# Patient Record
Sex: FEMALE | Race: WHITE | Employment: OTHER | URBAN - METROPOLITAN AREA
[De-identification: names, ages, dates, MRNs, and addresses within clinical notes are randomized per-mention and may not be internally consistent; named-entity substitution may affect disease eponyms.]

---

## 2017-01-12 ENCOUNTER — OFFICE VISIT (OUTPATIENT)
Dept: OBGYN | Facility: CLINIC | Age: 77
End: 2017-01-12
Payer: MEDICARE

## 2017-01-12 VITALS
BODY MASS INDEX: 29.37 KG/M2 | HEIGHT: 64 IN | WEIGHT: 172 LBS | DIASTOLIC BLOOD PRESSURE: 60 MMHG | SYSTOLIC BLOOD PRESSURE: 142 MMHG | HEART RATE: 60 BPM

## 2017-01-12 DIAGNOSIS — R22.32 MASS OF LEFT AXILLA: Primary | ICD-10-CM

## 2017-01-12 PROCEDURE — 99202 OFFICE O/P NEW SF 15 MIN: CPT | Performed by: OBSTETRICS & GYNECOLOGY

## 2017-01-12 NOTE — PROGRESS NOTES
SUBJECTIVE:                                                   Aditi Palacios is a 76 year old female who presents to clinic today for the following health issue(s):  Patient presents with:  Breast Mass: lump under left axillary noticed yesterday, painful to touch    Additional Information: patients last mammogram was as [facility] 2009    HPI:The patient is seen at this time for a new left axillary mass. She has a history of lung cancer with a left lobectomy 11 years ago. She had no follow-up after that.  Her last mammogram was in 2009. She denies any nipple discharge at all. She states that the mass is not in the breast but actually in the axilla.      No LMP recorded. Patient has had a hysterectomy..   Patient is not sexually active, post menopausa  STD testing offered?  Declined - not sexually active  Health maintenance updated:  Maintained with primary care provider      Problem list and histories reviewed & adjusted, as indicated.  Additional history: as documented.    Patient Active Problem List   Diagnosis     Myalgia     Acute kidney injury (H)     GI bleed     Past Surgical History   Procedure Laterality Date     Billateral common illiac artery stent       Implant pacemaker       medtronic     Back surgery       c2-5 and 5-6 fusion     Extracapsular cataract extration with intraocular lens implant       Thoracotomy  12/18/01     L upper lobe     Orthopedic surgery       L ankle     Gyn surgery       total hysterectomy     Breast surgery        Social History   Substance Use Topics     Smoking status: Not on file     Smokeless tobacco: Not on file     Alcohol Use: Not on file           Current Outpatient Prescriptions   Medication Sig     LISINOPRIL PO Take 5 mg in am and take 20 mg at bedtime     MELATONIN PO Take 5 mg by mouth At Bedtime     HYDROcodone-acetaminophen (NORCO) 5-325 MG per tablet Take 1 tablet by mouth Take 1 to 2 tablets every 6 hours as needed     ALPRAZolam (XANAX) 0.5 MG tablet Take  0.5 mg by mouth 3 times daily      omeprazole 20 MG tablet Take 1 tablet (20 mg) by mouth daily Take 30-60 minutes before a meal.     aspirin 81 MG chewable tablet Take 81 mg by mouth every evening     FUROSEMIDE PO Take 20 mg by mouth daily Am and afternoon     hypromellose (GENTEAL) ophthalmic gel 0.3% Place 1-2 drops into both eyes At Bedtime     polyethylene glycol 0.4%- propylene glycol 0.3% (SYSTANE ULTRA) 0.4-0.3 % SOLN ophthalmic solution Place 1 drop into both eyes every hour as needed for dry eyes     polyethylene glycol (MIRALAX/GLYCOLAX) powder Take 1 capful by mouth every evening as needed for constipation     psyllium (METAMUCIL) 58.6 % POWD Take 3 teaspoonful by mouth every evening as needed for constipation = 1 Tablespoons     Cholecalciferol (VITAMIN D3 PO) Take 1,000 Units by mouth daily     brinzolamide-brimonidine (SIMBRINZA) 1-0.2 % ophthalmic suspension Place 1 drop into both eyes 2 times daily     diltiazem (TIAZAC) 240 MG CP24 Take 240 mg by mouth every evening      Flecainide Acetate (TAMBOCOR PO) Take 50 mg by mouth 2 times daily     LEVOTHYROXINE SODIUM PO Take 88 mcg by mouth daily      lidocaine (LIDODERM) 5 % patch Place 1-3 patches onto the skin every 24 hours 12 hours on, 12 hours off     PRAVASTATIN SODIUM PO Take 10 mg by mouth every evening      SPIRONOLACTONE PO Take 12.5 mg by mouth daily      WARFARIN SODIUM PO Take 5 mg by mouth every evening      CARVEDILOL 12.5 MG OR TABS take 1 tablet in am, 2 tablets in pm and 1 tablet at night     XALATAN 0.005 % OP SOLN 1 drop in both eyes daily at bedtime     No current facility-administered medications for this visit.     Allergies   Allergen Reactions     Amitriptyline      Atenolol      Codeine      Coreg [Carvedilol]      CR only     Crestor [Rosuvastatin]      Doxycycline      Fentanyl      Lotrel      Niaspan [Niacin]      No Clinical Screening - See Comments      Fentanyl/Versed combination     Other [Seasonal Allergies]      Beta  "blockers except for carvedilol     Percocet [Oxycodone-Acetaminophen]      Percodan [Oxycodone-Aspirin]      Premarin      Questran [Cholestyramine]      Tape [Adhesive Tape]      Tramadol      Vioxx      Zocor [Simvastatin]        ROS:  12 point review of systems negative other than symptoms noted below.  Breast: Lumps    OBJECTIVE:     /60 mmHg  Pulse 60  Ht 1.626 m (5' 4\")  Wt 78.019 kg (172 lb)  BMI 29.51 kg/m2  Body mass index is 29.51 kg/(m^2).    Exam:  Constitutional:  Appearance: Well nourished, well developed alert, in no acute distress  Neck:  Lymph Nodes:  No lymphadenopathy present; Thyroid:  Gland size normal, nontender, no nodules or masses present on palpation  Breasts:  Inspection of Breasts:  No lymphadenopathy present; Palpation of Breasts and Axillae:  5 mm masspresent on palpation in the left axillae, bilat breast tenderness     In-Clinic Test Results:      ASSESSMENT/PLAN:                                                        ICD-10-CM    1. Mass of left axilla R22.32 MA Diagnostic Digital Bilateral       76-year-old patient with past history of lung cancer and distant mammogram who is seen for a 5 mm mobile firmleft axillary subcutaneous nodule. It is indeterminate whether or not this is a lymph node. I do not believe that it is breast tissue. We will expedite getting her to the [facility] for evaluation yet today.        Aaron Wilks MD  [facility]    "

## 2017-01-12 NOTE — MR AVS SNAPSHOT
After Visit Summary   1/12/2017    Aditi Palacios    MRN: [id number]           Patient Information     Date Of Birth          1940        Visit Information        Provider Department      1/12/2017 1:45 PM Aaron Wilks MD [facility]        Today's Diagnoses     Mass of left axilla    -  1        Follow-ups after your visit        Your next 10 appointments already scheduled     Jan 13, 2017  9:30 AM   MA DIAGNOSTIC DIGITAL BILATERAL with SHBCMA3   [facility] ([facility])    [address] [place] [address], [address]  [facility] [address]   [phone]           Do not use any powder, lotion or deodorant under your arms or on your breast. If you do, we will ask you to remove it before your exam.  Wear comfortable, two-piece clothing.  If you have any allergies, tell your care team.  Bring any previous mammograms from other facilities or have them mailed to the breast center.            Jan 13, 2017 10:00 AM   US AXILLARY LEFT with SHBCUS2   [facility] ([facility])    [address], [address]  [facility] [address]   [phone]           Please bring a list of your medicines (including vitamins, minerals and over-the-counter drugs). Also, tell your doctor about any allergies you may have. Wear comfortable clothes and leave your valuables at home.  You do not need to do anything special to prepare for your exam.  Please call the Imaging Department at your exam site with any questions.              Future tests that were ordered for you today     Open Future Orders        Priority Expected Expires Ordered    US Axillary Left Routine  1/12/2018 1/12/2017    MA Diagnostic Digital Bilateral Routine  1/12/2018 1/12/2017            Who to contact     If you have questions or need follow up information about today's clinic visit or your schedule please contact [facility] directly at  "[phone].  Normal or non-critical lab and imaging results will be communicated to you by MyChart, letter or phone within 4 business days after the clinic has received the results. If you do not hear from us within 7 days, please contact the clinic through Alertshart or phone. If you have a critical or abnormal lab result, we will notify you by phone as soon as possible.  Submit refill requests through Newdea or call your pharmacy and they will forward the refill request to us. Please allow 3 business days for your refill to be completed.          Additional Information About Your Visit        Alertshart Information     Newdea lets you send messages to your doctor, view your test results, renew your prescriptions, schedule appointments and more. To sign up, go to [email or web address] . Click on \"Log in\" on the left side of the screen, which will take you to the Welcome page. Then click on \"Sign up Now\" on the right side of the page.     You will be asked to enter the access code listed below, as well as some personal information. Please follow the directions to create your username and password.     Your access code is: [email or web address]  Expires: 2017  2:37 PM     Your access code will  in 90 days. If you need help or a new code, please call your [facility] clinic or [phone].        Care EveryWhere ID     This is your Care EveryWhere ID. This could be used by other organizations to access your [facility] medical records  [id number]        Your Vitals Were     Pulse Height BMI (Body Mass Index)             60 5' 4\" (1.626 m) 29.51 kg/m2          Blood Pressure from Last 3 Encounters:   17 142/60   16 165/70   10/02/16 156/50    Weight from Last 3 Encounters:   17 172 lb (78.019 kg)   16 170 lb (77.111 kg)   01/03/15 169 lb (76.658 kg)              Today, you had the following     No orders found for display       Primary Care Provider Office Phone # Fax #    Parveen Short MD " [phone]       [facility] [address]  [facility] [address]        Thank you!     Thank you for choosing [facility]  for your care. Our goal is always to provide you with excellent care. Hearing back from our patients is one way we can continue to improve our services. Please take a few minutes to complete the written survey that you may receive in the mail after your visit with us. Thank you!             Your Updated Medication List - Protect others around you: Learn how to safely use, store and throw away your medicines at www.disposemymeds.org.          This list is accurate as of: 1/12/17  2:38 PM.  Always use your most recent med list.                   Brand Name Dispense Instructions for use    ALPRAZolam 0.5 MG tablet    XANAX    45 tablet    Take 0.5 mg by mouth 3 times daily       aspirin 81 MG chewable tablet      Take 81 mg by mouth every evening       brinzolamide-brimonidine 1-0.2 % ophthalmic suspension    SIMBRINZA     Place 1 drop into both eyes 2 times daily       carvedilol 12.5 MG tablet    COREG     take 1 tablet in am, 2 tablets in pm and 1 tablet at night       diltiazem 240 MG 24 hr ER beaded capsule    TIAZAC     Take 240 mg by mouth every evening       FUROSEMIDE PO      Take 20 mg by mouth daily Am and afternoon       HYDROcodone-acetaminophen 5-325 MG per tablet    NORCO    1 tablet    Take 1 tablet by mouth Take 1 to 2 tablets every 6 hours as needed       hypromellose 0.3 % Gel ophthalmic gel    GENTEAL     Place 1-2 drops into both eyes At Bedtime       LEVOTHYROXINE SODIUM PO      Take 88 mcg by mouth daily       lidocaine 5 % Patch    LIDODERM     Place 1-3 patches onto the skin every 24 hours 12 hours on, 12 hours off       LISINOPRIL PO      Take 5 mg in am and take 20 mg at bedtime       MELATONIN PO      Take 5 mg by mouth At Bedtime       omeprazole 20 MG tablet     30 tablet    Take 1 tablet (20 mg) by mouth daily  Take 30-60 minutes before a meal.       polyethylene glycol powder    MIRALAX/GLYCOLAX     Take 1 capful by mouth every evening as needed for constipation       PRAVASTATIN SODIUM PO      Take 10 mg by mouth every evening       psyllium 58.6 % Powd    METAMUCIL     Take 3 teaspoonful by mouth every evening as needed for constipation = 1 Tablespoons       SPIRONOLACTONE PO      Take 12.5 mg by mouth daily       SYSTANE ULTRA 0.4-0.3 % Soln ophthalmic solution   Generic drug:  polyethylene glycol 0.4%- propylene glycol 0.3%      Place 1 drop into both eyes every hour as needed for dry eyes       TAMBOCOR PO      Take 50 mg by mouth 2 times daily       VITAMIN D3 PO      Take 1,000 Units by mouth daily       WARFARIN SODIUM PO      Take 5 mg by mouth every evening       XALATAN 0.005 % ophthalmic solution   Generic drug:  latanoprost      1 drop in both eyes daily at bedtime

## 2017-01-13 ENCOUNTER — HOSPITAL ENCOUNTER (OUTPATIENT)
Dept: MAMMOGRAPHY | Facility: CLINIC | Age: 77
End: 2017-01-13
Attending: OBSTETRICS & GYNECOLOGY
Payer: MEDICARE

## 2017-01-13 ENCOUNTER — HOSPITAL ENCOUNTER (OUTPATIENT)
Dept: MAMMOGRAPHY | Facility: CLINIC | Age: 77
Discharge: HOME | End: 2017-01-13
Attending: OBSTETRICS & GYNECOLOGY | Admitting: OBSTETRICS & GYNECOLOGY
Payer: MEDICARE

## 2017-01-13 DIAGNOSIS — R22.32 MASS OF LEFT AXILLA: ICD-10-CM

## 2017-01-13 DIAGNOSIS — N64.89 OTHER SPECIFIED DISORDERS OF BREAST: ICD-10-CM

## 2017-01-13 DIAGNOSIS — N63.0 LUMP IN FEMALE BREAST: ICD-10-CM

## 2017-01-13 PROCEDURE — 76642 ULTRASOUND BREAST LIMITED: CPT | Mod: 50

## 2017-01-13 PROCEDURE — G0204 DX MAMMO INCL CAD BI: HCPCS

## 2017-01-16 ENCOUNTER — TELEPHONE (OUTPATIENT)
Dept: NURSING | Facility: CLINIC | Age: 77
End: 2017-01-16

## 2017-01-16 NOTE — TELEPHONE ENCOUNTER
ppatient informed to see Dr. Valerio Woodson about having the nodule removed. She accepts this recommendation.

## 2017-01-16 NOTE — TELEPHONE ENCOUNTER
1/12/17 Office visit.  [facility] leaving left axilla lump and just watching. Pt is not happy with this recommendation. Dr. Mcconnell gave pt a letter and wrote on side of letter that pt needs to check in on Wednesday 1/18/17. Pt does not know what this appointment is for. Pt wants Dr. Wilks's recommendation and pt wants something done with mass on the right and remove mass on the left. Routing to Dr. Wilks. Please advise. Pt can be reached at [phone].       [facility]: BI-RADS CATEGORY: 4 - Suspicious.     1. Indeterminate hypoechoic nodule adjacent to the right nipple, 12:00  position. Ultrasound-guided core biopsy is recommended.     2. No evidence of malignancy in the left axilla.     3. Results and recommendation for right breast biopsy were discussed  with the patient during her appointment.     RECOMMENDED FOLLOW-UP: Biopsy.IMPRESSION: BI-RADS CATEGORY: 4 - Suspicious.     1. Indeterminate hypoechoic nodule adjacent to the right nipple, 12:00  position. Ultrasound-guided core biopsy is recommended.     2. No evidence of malignancy in the left axilla.     3. Results and recommendation for right breast biopsy were discussed  with the patient during her appointment.     RECOMMENDED FOLLOW-UP: Biopsy.

## 2017-01-16 NOTE — TELEPHONE ENCOUNTER
Saw Dr Wilks about lump.  Saw Radiologty and they handed her a letter and it's not clear as to what she's to do regarding lumps found in breast. Please call.  Pebbles Thompson RN-[facility]

## 2017-01-27 ENCOUNTER — RECORDS - HEALTHEAST (OUTPATIENT)
Dept: ADMINISTRATIVE | Facility: OTHER | Age: 77
End: 2017-01-27

## 2017-01-31 ENCOUNTER — OFFICE VISIT (OUTPATIENT)
Dept: SURGERY | Facility: CLINIC | Age: 77
End: 2017-01-31
Payer: MEDICARE

## 2017-01-31 VITALS — WEIGHT: 172 LBS | OXYGEN SATURATION: 98 % | HEART RATE: 60 BPM | BODY MASS INDEX: 29.37 KG/M2 | HEIGHT: 64 IN

## 2017-01-31 DIAGNOSIS — N63.10 BREAST MASS, RIGHT: Primary | ICD-10-CM

## 2017-01-31 PROCEDURE — 99203 OFFICE O/P NEW LOW 30 MIN: CPT | Performed by: SURGERY

## 2017-01-31 NOTE — MR AVS SNAPSHOT
After Visit Summary   1/31/2017    Aditi Palacios    MRN: [id number]           Patient Information     Date Of Birth          1940        Visit Information        Provider Department      1/31/2017 1:00 PM Valerio Woodson MD [facility]      Today's Diagnoses     Breast mass, right    -  1        Follow-ups after your visit        Your next 10 appointments already scheduled     Feb 08, 2017 11:00 AM   US BREAST BIOPSY CORE NEEDLE RIGHT with SHBCUS1,  BREAST NURSE, SH BREAST RAD   [facility] ([facility])    [address] [place] [address], [address]  [facility] [id number] [phone]           Tell us in advance if there s any chance you may be pregnant.  Bring a list of your medicines to the exam. Include vitamins, minerals and over-the-counter drugs.  If you take blood thinners, you may need to stop taking them a few days before treatment. Talk to your doctor before stopping these medicines. You will need a blood test the morning of your exam.   Stop taking Coumadin (warfarin) 3 days before your exam. Restart the day after your exam.   If you take aspirin, you may need to stop taking it 3 days before your scan.   If you take Plavix, Ticlid, Pletal or Persantine, you may need to stop taking them 5 days before your scan. Please talk to your doctor before stopping these medicines.  If you will receive sedation for this test (medicine to help you relax):   See your family doctor for an exam within 30 days of treatment.   Plan for an adult to drive you home and stay with you for at least 6 hours.   Follow the eating and drinking guidelines checked below:   No eating or drinking for 4 hours before your test. You may take medicine with small sips of water.   If you have diabetes:If you take insulin, call your diabetes care team. Do not take diabetes pills on the morning of your test. If you take metformin  (Avandamet, Glucophage, Glucovance, Metaglip) and received contrast, wait 48 hours before re-starting this medicine.  Please call the Imaging Department at your exam site with any questions.            Feb 08, 2017 12:00 PM   MA POST PROCEDURE RIGHT with SHBCMA1   [facility] ([facility])    [address], [address]  [place] [id number] [phone]           Do not use any powder, lotion or deodorant under your arms or on your breast. If you do, we will ask you to remove it before your exam.  Wear comfortable, two-piece clothing.  If you have any allergies, tell your care team.  Bring any previous mammograms from other facilities or have them mailed to the breast center.              Future tests that were ordered for you today     Open Future Orders        Priority Expected Expires Ordered    US Breast Radioactive Seed Placement, 1St Lesion Right Routine 1/31/2017 1/31/2018 1/31/2017    MA Breast Specimen Right OR Routine 1/31/2017 1/31/2018 1/31/2017    MA Post Procedure Right Routine 1/31/2017 1/31/2018 1/31/2017            Who to contact     If you have questions or need follow up information about today's clinic visit or your schedule please contact [facility] directly at [phone].  Normal or non-critical lab and imaging results will be communicated to you by Authyhart, letter or phone within 4 business days after the clinic has received the results. If you do not hear from us within 7 days, please contact the clinic through MyChart or phone. If you have a critical or abnormal lab result, we will notify you by phone as soon as possible.  Submit refill requests through Wikidata or call your pharmacy and they will forward the refill request to us. Please allow 3 business days for your refill to be completed.          Additional Information About Your Visit        Wikidata Information     Wikidata lets you send messages to your doctor, view your test  "results, renew your prescriptions, schedule appointments and more. To sign up, go to [email or web address] . Click on \"Log in\" on the left side of the screen, which will take you to the Welcome page. Then click on \"Sign up Now\" on the right side of the page.     You will be asked to enter the access code listed below, as well as some personal information. Please follow the directions to create your username and password.     Your access code is: [email or web address]  Expires: 2017  2:37 PM     Your access code will  in 90 days. If you need help or a new code, please call your [facility] clinic or [phone].        Care EveryWhere ID     This is your Care EveryWhere ID. This could be used by other organizations to access your [facility] medical records  [id number]        Your Vitals Were     Pulse Height BMI (Body Mass Index) Pulse Oximetry          60 5' 4\" (1.626 m) 29.51 kg/m2 98%         Blood Pressure from Last 3 Encounters:   17 142/60   16 165/70   10/02/16 156/50    Weight from Last 3 Encounters:   17 172 lb (78.019 kg)   17 172 lb (78.019 kg)   16 170 lb (77.111 kg)               Primary Care Provider Office Phone # Fax #    Parveen Short [phone]       [facility] [address]  [facility] [address]        Thank you!     Thank you for choosing [facility]  for your care. Our goal is always to provide you with excellent care. Hearing back from our patients is one way we can continue to improve our services. Please take a few minutes to complete the written survey that you may receive in the mail after your visit with us. Thank you!             Your Updated Medication List - Protect others around you: Learn how to safely use, store and throw away your medicines at www.disposemymeds.org.          This list is accurate as of: 17  2:02 PM.  Always use your most recent med list.                   Brand Name " Dispense Instructions for use    ALPRAZolam 0.5 MG tablet    XANAX    45 tablet    Take 0.5 mg by mouth 3 times daily       aspirin 81 MG chewable tablet      Take 81 mg by mouth every evening       brinzolamide-brimonidine 1-0.2 % ophthalmic suspension    SIMBRINZA     Place 1 drop into both eyes 2 times daily       carvedilol 12.5 MG tablet    COREG     take 1 tablet in am, 2 tablets in pm and 1 tablet at night       diltiazem 240 MG 24 hr ER beaded capsule    TIAZAC     Take 240 mg by mouth every evening       FUROSEMIDE PO      Take 20 mg by mouth daily Am and afternoon       HYDROcodone-acetaminophen 5-325 MG per tablet    NORCO    1 tablet    Take 1 tablet by mouth Take 1 to 2 tablets every 6 hours as needed       hypromellose 0.3 % Gel ophthalmic gel    GENTEAL     Place 1-2 drops into both eyes At Bedtime       LEVOTHYROXINE SODIUM PO      Take 88 mcg by mouth daily       lidocaine 5 % Patch    LIDODERM     Place 1-3 patches onto the skin every 24 hours 12 hours on, 12 hours off       LISINOPRIL PO      Take 5 mg in am and take 20 mg at bedtime       MELATONIN PO      Take 5 mg by mouth At Bedtime       omeprazole 20 MG tablet     30 tablet    Take 1 tablet (20 mg) by mouth daily Take 30-60 minutes before a meal.       polyethylene glycol powder    MIRALAX/GLYCOLAX     Take 1 capful by mouth every evening as needed for constipation       PRAVASTATIN SODIUM PO      Take 10 mg by mouth every evening       psyllium 58.6 % Powd    METAMUCIL     Take 3 teaspoonful by mouth every evening as needed for constipation = 1 Tablespoons       SPIRONOLACTONE PO      Take 12.5 mg by mouth daily       SYSTANE ULTRA 0.4-0.3 % Soln ophthalmic solution   Generic drug:  polyethylene glycol 0.4%- propylene glycol 0.3%      Place 1 drop into both eyes every hour as needed for dry eyes       TAMBOCOR PO      Take 50 mg by mouth 2 times daily       VITAMIN D3 PO      Take 1,000 Units by mouth daily       WARFARIN SODIUM PO       Take 5 mg by mouth every evening       XALATAN 0.005 % ophthalmic solution   Generic drug:  latanoprost      1 drop in both eyes daily at bedtime

## 2017-01-31 NOTE — Clinical Note
"Surgery Consultation, [facility]    January 31, 2017         Valerio Woodson MD    Aditi Palacios MRN# [id number]   YOB: 1940 Age: 76 year old     PCP:  Parveen Short [phone]    Chief Complaint:  Right breast mass, left axillary cyst    Pt was seen in consultation from Parveen Short.    History of Present Illness:  Aditi Palacios is a 76 year old female who presented with fullness and irritation in the left axilla. Patient noticed swelling and fullness in the left and eventually had some purulent  Imaging was done of both breasts.The left axillary area was thought to represent a sebaceous cyst but small nodule was also noted in the retro-areolar right breast.  Patient does not have a history of breast cancer but she has had benign biopsies.  She is postmenopausal, takes no hormone replacement.  She is on Coumadin for a history of vascular disease.    PMH:  Aditi Palacios  has a past medical history of Anxiety; Vascular complications of mesenteric artery; Atrial fibrillation (H); Fibromyalgia; Gastro-oesophageal reflux disease; Glaucoma; Hypothyroidism; Hypertension; Cancer (H); and Scoliosis.  PSH:  Aditi Palacios  has past surgical history that includes Billateral common illiac artery stent; Implant pacemaker; back surgery; Extracapsular cataract extration with intraocular lens implant; Thoracotomy (12/18/01); orthopedic surgery; GYN surgery; and Breast surgery.    Home medications and allergies reviewed.    Social History:  Aditi Palacios  reports that she has never smoked. She does not have any smokeless tobacco history on file.  Family History:  Aditi Palacios family history includes Unknown/Adopted in her father and mother.    ROS:  The 10 point Review of Systems is negative other than noted in the HPI.  No weight loss or fevers    Physical Exam:  Pulse 60, height 5' 4\" (1.626 m), weight 172 lb (78.019 kg), SpO2 98 %.  172 lbs 0 oz  Pleasant, poorly mobile " female in no distress.  Patient has a pleasant affect and communicates well.   Pupils equal round and reactive to light.   No cervical lymphadenopathy or thyromegaly.   Lung fields clear, breathing comfortably.   Heart normal sinus rhythm.  No murmurs rubs or gallops.  Bilateral breast exam performed. Moderately sized, ptotic breasts .  No axillary lymphadenopathy. Small sebaceous cysts in right axilla.  Skin warm, dry.  No obvious rashes or lesions.    All new lab and imaging data was reviewed, including imaging studies.     Assessment/plan:  Pleasant 76-year-old female with right breast lesion and left axillary cysts. I recommended a biopsy of the right breast lesion under ultrasound guidance.  I would then recommend excision of  left axillary cysts. If the lesion on the right is cancer I would advocate seed localization with wide local excision and sentinel lymph node biopsy followed up with radiation therapy We will get the biopsy ordered.      Herminio Woodson M.D.  [facility]  [phone]

## 2017-01-31 NOTE — NURSING NOTE
Breast Patients    BREAST PATIENTS (ALL)    1-Do you have any of the following symptoms? Lump(s) or Mass(es)  2-In which breast are you having the symptoms? R  3-Do you use hormones?  No  4-Have you had a Mammogram? Yes  Where:  breast center  Date: 1/13/17  5-Have you ever had a breast cyst drained? No  6-Have you ever had a breast biopsy? Yes, years ago  7-Have you ever had a Breast Cancer? No   8-Is there a history of Breast Cancer in your family? Yes   Relationship to you:    Grandmother  9-Have you ever had Ovarian Cancer? No  10-Is there a history of Ovarian Cancer in your family? No  11-Summarize your caffeine intake (i.e. coffee, tea, chocolate, soda etc.): occasional    BREAST PATIENTS (FEMALE)    12-What age did your periods begin? 12  13-Date your last menstrual period began? 8/13/81 hysterectomy  14-Number of full-term pregnancies: 6  15-Your age when your first child was born? 18  16-Did you nurse your children? No  17-Are you pregnant now? No  18-Have you begun menopause? Yes  Age Menopause began:  1981  19-Have you had either ovary removed?Yes  Date of Surgery:  1981  20-Do you have breast implants? No

## 2017-02-01 ENCOUNTER — HOSPITAL ENCOUNTER (OUTPATIENT)
Facility: CLINIC | Age: 77
End: 2017-02-01
Attending: SURGERY | Admitting: SURGERY
Payer: MEDICARE

## 2017-02-01 PROBLEM — N63.10 BREAST MASS, RIGHT: Status: ACTIVE | Noted: 2017-02-01

## 2017-02-01 NOTE — PROGRESS NOTES
"Surgery Consultation, [facility]         Valerio Woodson MD    Aditi Palacios MRN# [id number]   YOB: 1940 Age: 76 year old     PCP:  Parveen Short [phone]    Chief Complaint:  Right breast mass, left axillary cyst    Pt was seen in consultation from Parveen Short.    History of Present Illness:  Aditi Palacios is a 76 year old female who presented with fullness and irritation in the left axilla. Patient noticed swelling and fullness in the left and eventually had some purulent  Imaging was done of both breasts.The left axillary area was thought to represent a sebaceous cyst but small nodule was also noted in the retro-areolar right breast.  Patient does not have a history of breast cancer but she has had benign biopsies.  She is postmenopausal, takes no hormone replacement.  She is on Coumadin for a history of vascular disease.    PMH:  Aditi Palacios  has a past medical history of Anxiety; Vascular complications of mesenteric artery; Atrial fibrillation (H); Fibromyalgia; Gastro-oesophageal reflux disease; Glaucoma; Hypothyroidism; Hypertension; Cancer (H); and Scoliosis.  PSH:  Aditi Palacios  has past surgical history that includes Billateral common illiac artery stent; Implant pacemaker; back surgery; Extracapsular cataract extration with intraocular lens implant; Thoracotomy (12/18/01); orthopedic surgery; GYN surgery; and Breast surgery.    Home medications and allergies reviewed.    Social History:  Aditi Palacios  reports that she has never smoked. She does not have any smokeless tobacco history on file.  Family History:  Aditi Palacios family history includes Unknown/Adopted in her father and mother.    ROS:  The 10 point Review of Systems is negative other than noted in the HPI.  No weight loss or fevers    Physical Exam:  Pulse 60, height 5' 4\" (1.626 m), weight 172 lb (78.019 kg), SpO2 98 %.  172 lbs 0 oz  Pleasant, poorly mobile female in no " distress.  Patient has a pleasant affect and communicates well.   Pupils equal round and reactive to light.   No cervical lymphadenopathy or thyromegaly.   Lung fields clear, breathing comfortably.   Heart normal sinus rhythm.  No murmurs rubs or gallops.  Bilateral breast exam performed. Moderately sized, ptotic breasts .  No axillary lymphadenopathy. Small sebaceous cysts in right axilla.  Skin warm, dry.  No obvious rashes or lesions.    All new lab and imaging data was reviewed, including imaging studies.     Assessment/plan:  Pleasant 76-year-old female with right breast lesion and left axillary cysts. I recommended a biopsy of the right breast lesion under ultrasound guidance.  I would then recommend excision of  left axillary cysts. If the lesion on the right is cancer I would advocate seed localization with wide local excision and sentinel lymph node biopsy followed up with radiation therapy We will get the biopsy ordered.      Herminio Woodson M.D.  [facility]  [phone]    Please route or send letter to:  Primary Care Provider (PCP) and Referring Provider    HPI      ROS      Physical Exam

## 2017-02-02 ENCOUNTER — TELEPHONE (OUTPATIENT)
Dept: MAMMOGRAPHY | Facility: CLINIC | Age: 77
End: 2017-02-02

## 2017-02-02 RX ORDER — CEFAZOLIN SODIUM 2 G/100ML
2 INJECTION, SOLUTION INTRAVENOUS
Status: CANCELLED | OUTPATIENT
Start: 2017-02-02

## 2017-02-02 RX ORDER — CEFAZOLIN SODIUM 1 G/3ML
1 INJECTION, POWDER, FOR SOLUTION INTRAMUSCULAR; INTRAVENOUS SEE ADMIN INSTRUCTIONS
Status: CANCELLED | OUTPATIENT
Start: 2017-02-02

## 2017-02-02 NOTE — TELEPHONE ENCOUNTER
Ms. Palacios is on Coumadin.  I discussed if we needed to stop Coumadin prior to her 2/8/2017 Right Breast Ultrasound biopsy with Breast Center Radiologist Dr. Amie Wynn.  Dr. Wynn said we do NOT need to hold her coumadin.  I called Ms. Palacios and instructed her that she does not need to stop any of her medication prior to her Breast Biopsy.  She reports she has not had INR's that were above 3.0.  She stays within her therapeutic range of 2 - 3.  She will have her INR checked a day or two before biopsy, to verify it is not above 3.0.  She was also given instructions related to the Date, time and location of Breast Biopsy.  She and her  state understanding.

## 2017-02-08 ENCOUNTER — HOSPITAL ENCOUNTER (OUTPATIENT)
Dept: MAMMOGRAPHY | Facility: CLINIC | Age: 77
Discharge: HOME | End: 2017-02-08
Attending: OBSTETRICS & GYNECOLOGY | Admitting: OBSTETRICS & GYNECOLOGY
Payer: MEDICARE

## 2017-02-08 ENCOUNTER — HOSPITAL ENCOUNTER (OUTPATIENT)
Dept: MAMMOGRAPHY | Facility: CLINIC | Age: 77
End: 2017-02-08
Attending: OBSTETRICS & GYNECOLOGY
Payer: MEDICARE

## 2017-02-08 DIAGNOSIS — N63.0 LUMP IN FEMALE BREAST: ICD-10-CM

## 2017-02-08 PROCEDURE — 40000272 MA POST PROCEDURE RIGHT

## 2017-02-08 PROCEDURE — 88305 TISSUE EXAM BY PATHOLOGIST: CPT | Performed by: RADIOLOGY

## 2017-02-08 PROCEDURE — 88305 TISSUE EXAM BY PATHOLOGIST: CPT | Mod: 26 | Performed by: RADIOLOGY

## 2017-02-08 PROCEDURE — 19083 BX BREAST 1ST LESION US IMAG: CPT | Mod: RT

## 2017-02-08 PROCEDURE — 25000125 ZZHC RX 250: Performed by: OBSTETRICS & GYNECOLOGY

## 2017-02-08 RX ADMIN — LIDOCAINE HYDROCHLORIDE 5 ML: 10 INJECTION, SOLUTION INFILTRATION; PERINEURAL at 11:25

## 2017-02-08 NOTE — DISCHARGE INSTRUCTIONS
After Your Breast Biopsy    Bleeding or bruising: Slight bruising is normal.  If you bleed through the bandage, put direct pressure on the breast.  If you are still bleeding after 20 minutes, call the doctor who ordered the exam.    Bandages: Keep your bandage in place until tomorrow morning.  Do not get it wet.  Leave the tape in place for two days.  On the second day, cover it with a Band-Aid.    Activity: You may shower the morning after the exam.  No heavy activity (lifting, vacuuming) for 24 hours.    Discomfort: Wear your bra overnight to support the breast.  You may take Tylenol (acetaminophen) for pain.  If you had a stereotactic of MR-directed biopsy, you may take aspirin or ibuprofen (Advil, Motrin) the morning after your biopsy, unless your doctor tells you not to.    Infection: Infection is rare.  Symptoms include fever, redness, increasing pain and fluid draining from the biopsy site.  If you have any of these symptoms, please call the doctor who ordered your exam.    Results: Results may take up to three business days.  If you have not heard your results in three days, call the Breast Center Nurse at [phone] or [phone].  In rare cases, we may need to do another biopsy.    Call the doctor who ordered your exam if:    You have bleeding that lasts more than 20 minutes.    You have pain that cannot be controlled.    You have signs of infection (fever, redness, drainage or other signs).    You have not had your results within three days.    Nurse navigator: Our nurse navigator is here to answer your questions and help you set up future clinic visits.  Please call [phone].    Thank you for choosing [facility].  Please call us if you have questions or concerns about your biopsy.

## 2017-02-09 ENCOUNTER — TELEPHONE (OUTPATIENT)
Dept: MAMMOGRAPHY | Facility: CLINIC | Age: 77
End: 2017-02-09

## 2017-02-09 LAB — COPATH REPORT: NORMAL

## 2017-02-09 NOTE — TELEPHONE ENCOUNTER
After review by Breast Center Radiologist, Dr. Amie Wynn, Ms. Palacios was called and given her 2/8/17 Right Breast Biopsy results (Benign breast tissue/stromal fibrosis) and recommended Follow up (Annual Screening).  Biopsy site without issues or concerns. Mild bruising noted this morning with a scant amount of bleeding from biopsy site while she slept last night.  Ms. Palacios placed a fresh band aid on this morning to protect her clothing, but denies any present bleeding or issues.  I encouraged her to perform monthly breast self exams and to contact her doctor with any further breast concerns.

## 2017-02-13 ENCOUNTER — TELEPHONE (OUTPATIENT)
Dept: SURGERY | Facility: CLINIC | Age: 77
End: 2017-02-13

## 2017-02-13 NOTE — TELEPHONE ENCOUNTER
Patient called to cancel surgery on February 21st with Dr. Woodson.  She states that her left axillary cyst has decreased in size and given the fact that her ultrasound guided biopsy was benign she prefers to postpone Right breast lumpectomy as well.  She plans to follow-up with her primary physician and endocrinologist.

## 2017-02-23 ENCOUNTER — OFFICE VISIT (OUTPATIENT)
Dept: OBGYN | Facility: CLINIC | Age: 77
End: 2017-02-23
Payer: MEDICARE

## 2017-02-23 VITALS — SYSTOLIC BLOOD PRESSURE: 136 MMHG | DIASTOLIC BLOOD PRESSURE: 58 MMHG

## 2017-02-23 DIAGNOSIS — R22.32 MASS OF LEFT AXILLA: Primary | ICD-10-CM

## 2017-02-23 PROCEDURE — 99212 OFFICE O/P EST SF 10 MIN: CPT | Performed by: OBSTETRICS & GYNECOLOGY

## 2017-02-23 NOTE — MR AVS SNAPSHOT
"              After Visit Summary   2017    Aditi Palacios    MRN: [id number]           Patient Information     Date Of Birth          1940        Visit Information        Provider Department      2017 1:50 PM Da Castellanos MD [facility]        Today's Diagnoses     Mass of left axilla    -  1       Follow-ups after your visit        Who to contact     If you have questions or need follow up information about today's clinic visit or your schedule please contact [facility] directly at [phone].  Normal or non-critical lab and imaging results will be communicated to you by mPATHhart, letter or phone within 4 business days after the clinic has received the results. If you do not hear from us within 7 days, please contact the clinic through [facility] or phone. If you have a critical or abnormal lab result, we will notify you by phone as soon as possible.  Submit refill requests through eyesFinder or call your pharmacy and they will forward the refill request to us. Please allow 3 business days for your refill to be completed.          Additional Information About Your Visit        MyChart Information     eyesFinder lets you send messages to your doctor, view your test results, renew your prescriptions, schedule appointments and more. To sign up, go to [email or web address] . Click on \"Log in\" on the left side of the screen, which will take you to the Welcome page. Then click on \"Sign up Now\" on the right side of the page.     You will be asked to enter the access code listed below, as well as some personal information. Please follow the directions to create your username and password.     Your access code is: [email or web address]  Expires: 2017  2:37 PM     Your access code will  in 90 days. If you need help or a new code, please call your [place] clinic or [phone].        Care EveryWhere ID     This is your Care EveryWhere ID. This could be used by " other organizations to access your [facility] medical records  [id number]         Blood Pressure from Last 3 Encounters:   02/23/17 136/58   01/12/17 142/60   11/25/16 165/70    Weight from Last 3 Encounters:   01/31/17 172 lb (78 kg)   01/12/17 172 lb (78 kg)   02/26/16 170 lb (77.1 kg)              Today, you had the following     No orders found for display       Primary Care Provider Office Phone # Fax #    Parveen Short [phone]       [facility] [address]  [facility] [address]        Thank you!     Thank you for choosing [facility]  for your care. Our goal is always to provide you with excellent care. Hearing back from our patients is one way we can continue to improve our services. Please take a few minutes to complete the written survey that you may receive in the mail after your visit with us. Thank you!             Your Updated Medication List - Protect others around you: Learn how to safely use, store and throw away your medicines at www.disposemymeds.org.          This list is accurate as of: 2/23/17  2:24 PM.  Always use your most recent med list.                   Brand Name Dispense Instructions for use    ALPRAZolam 0.5 MG tablet    XANAX    45 tablet    Take 0.5 mg by mouth 3 times daily       aspirin 81 MG chewable tablet      Take 81 mg by mouth every evening       brinzolamide-brimonidine 1-0.2 % ophthalmic suspension    SIMBRINZA     Place 1 drop into both eyes 2 times daily       carvedilol 12.5 MG tablet    COREG     take 1 tablet in am, 2 tablets in pm and 1 tablet at night       diltiazem 240 MG 24 hr ER beaded capsule    TIAZAC     Take 240 mg by mouth every evening       FUROSEMIDE PO      Take 20 mg by mouth daily Am and afternoon       HYDROcodone-acetaminophen 5-325 MG per tablet    NORCO    1 tablet    Take 1 tablet by mouth Take 1 to 2 tablets every 6 hours as needed       hypromellose 0.3 % Gel ophthalmic gel     GENTEAL     Place 1-2 drops into both eyes At Bedtime       LEVOTHYROXINE SODIUM PO      Take 88 mcg by mouth daily       lidocaine 5 % Patch    LIDODERM     Place 1-3 patches onto the skin every 24 hours 12 hours on, 12 hours off       LISINOPRIL PO      Take 5 mg in am and take 20 mg at bedtime       MELATONIN PO      Take 5 mg by mouth At Bedtime       omeprazole 20 MG tablet     30 tablet    Take 1 tablet (20 mg) by mouth daily Take 30-60 minutes before a meal.       polyethylene glycol powder    MIRALAX/GLYCOLAX     Take 1 capful by mouth every evening as needed for constipation       PRAVASTATIN SODIUM PO      Take 10 mg by mouth every evening       psyllium 58.6 % Powd    METAMUCIL     Take 3 teaspoonful by mouth every evening as needed for constipation = 1 Tablespoons       SPIRONOLACTONE PO      Take 12.5 mg by mouth daily       SYSTANE ULTRA 0.4-0.3 % Soln ophthalmic solution   Generic drug:  polyethylene glycol 0.4%- propylene glycol 0.3%      Place 1 drop into both eyes every hour as needed for dry eyes       TAMBOCOR PO      Take 50 mg by mouth 2 times daily       VITAMIN D3 PO      Take 1,000 Units by mouth daily       WARFARIN SODIUM PO      Take 5 mg by mouth every evening       XALATAN 0.005 % ophthalmic solution   Generic drug:  latanoprost      1 drop in both eyes daily at bedtime

## 2017-02-23 NOTE — PROGRESS NOTES
SUBJECTIVE:                                                   Aditi Palacios is a 76 year old female who presents to clinic today for the following health issue(s):  Patient presents with:  Breast Problem: cancelled surgery for breast biopsy, wants to follow up with Dr. Castellanos      Additional information: lump under left arm    HPI:  Had left axillary mass. Mammo done and retro areolar lesion noted on right. Had biopsy of right. Benign.   Pt had drainage from lesion on left and decreased in size.   Wants f/u exam      No LMP recorded. Patient has had a hysterectomy..   Patient is not sexually active, No obstetric history on file..  Using not sexually active for contraception.    reports that she has never smoked. She does not have any smokeless tobacco history on file.    STD testing offered?  Declined    Health maintenance updated:  yes    Today's PHQ-2 Score: No flowsheet data found.  Today's PHQ-9 Score: No flowsheet data found.  Today's LALITA-7 Score: No flowsheet data found.    Problem list and histories reviewed & adjusted, as indicated.  Additional history: as documented.    Patient Active Problem List   Diagnosis     Myalgia     Acute kidney injury (H)     GI bleed     Breast mass, right     Past Surgical History   Procedure Laterality Date     Billateral common illiac artery stent       Implant pacemaker       medtronic     Back surgery       c2-5 and 5-6 fusion     Extracapsular cataract extration with intraocular lens implant       Thoracotomy  12/18/01     L upper lobe     Orthopedic surgery       L ankle     Gyn surgery       total hysterectomy     Breast surgery        Social History   Substance Use Topics     Smoking status: Never Smoker     Smokeless tobacco: Not on file     Alcohol use Not on file      Problem (# of Occurrences) Relation (Name,Age of Onset)    Unknown/Adopted (2) Mother, Father            Current Outpatient Prescriptions   Medication Sig     LISINOPRIL PO Take 5 mg in am and  take 20 mg at bedtime     MELATONIN PO Take 5 mg by mouth At Bedtime     HYDROcodone-acetaminophen (NORCO) 5-325 MG per tablet Take 1 tablet by mouth Take 1 to 2 tablets every 6 hours as needed     ALPRAZolam (XANAX) 0.5 MG tablet Take 0.5 mg by mouth 3 times daily      omeprazole 20 MG tablet Take 1 tablet (20 mg) by mouth daily Take 30-60 minutes before a meal.     aspirin 81 MG chewable tablet Take 81 mg by mouth every evening     FUROSEMIDE PO Take 20 mg by mouth daily Am and afternoon     hypromellose (GENTEAL) ophthalmic gel 0.3% Place 1-2 drops into both eyes At Bedtime     polyethylene glycol 0.4%- propylene glycol 0.3% (SYSTANE ULTRA) 0.4-0.3 % SOLN ophthalmic solution Place 1 drop into both eyes every hour as needed for dry eyes     polyethylene glycol (MIRALAX/GLYCOLAX) powder Take 1 capful by mouth every evening as needed for constipation     psyllium (METAMUCIL) 58.6 % POWD Take 3 teaspoonful by mouth every evening as needed for constipation = 1 Tablespoons     Cholecalciferol (VITAMIN D3 PO) Take 1,000 Units by mouth daily     brinzolamide-brimonidine (SIMBRINZA) 1-0.2 % ophthalmic suspension Place 1 drop into both eyes 2 times daily     diltiazem (TIAZAC) 240 MG CP24 Take 240 mg by mouth every evening      Flecainide Acetate (TAMBOCOR PO) Take 50 mg by mouth 2 times daily     LEVOTHYROXINE SODIUM PO Take 88 mcg by mouth daily      lidocaine (LIDODERM) 5 % patch Place 1-3 patches onto the skin every 24 hours 12 hours on, 12 hours off     PRAVASTATIN SODIUM PO Take 10 mg by mouth every evening      SPIRONOLACTONE PO Take 12.5 mg by mouth daily      WARFARIN SODIUM PO Take 5 mg by mouth every evening      CARVEDILOL 12.5 MG OR TABS take 1 tablet in am, 2 tablets in pm and 1 tablet at night     XALATAN 0.005 % OP SOLN 1 drop in both eyes daily at bedtime     No current facility-administered medications for this visit.      Allergies   Allergen Reactions     Amitriptyline      Atenolol      Codeine       Coreg [Carvedilol]      CR only     Crestor [Rosuvastatin]      Doxycycline      Fentanyl      Lotrel      Lyrica [Pregabalin]      Niaspan [Niacin]      No Clinical Screening - See Comments      Fentanyl/Versed combination     Other [Seasonal Allergies]      Beta blockers except for carvedilol     Percocet [Oxycodone-Acetaminophen]      Percodan [Oxycodone-Aspirin]      Premarin      Questran [Cholestyramine]      Tape [Adhesive Tape]      Tramadol      Vioxx      Zocor [Simvastatin]        ROS:  12 point review of systems negative other than symptoms noted below.  Constitutional: Fatigue  Eyes: Vision Loss  Breast: Lumps  Gastrointestinal: Bloating, Constipation and Diarrhea  Genitourinary: No Periods  Skin: Skin Dryness  Neurologic: Tremors  Musculoskeletal: Muscular Weakness  Endocrine: Cold Intolerance and Decreased Libido  Psychiatric: Anxiety  Blood/Lymph: Easy Bleeding    OBJECTIVE:     /58  There is no height or weight on file to calculate BMI.    Exam:  Constitutional:  Appearance: Well nourished, well developed alert, in no acute distress  Neurologic/Psychiatric:  Mental Status:  Oriented X3    Left Axilla. No masses or lesions.  Chest. Area around pacemaker tender.    In-Clinic Test Results:  No results found for this or any previous visit (from the past 24 hour(s)).    ASSESSMENT/PLAN:                                                        ICD-10-CM    1. Mass of left axilla R22.32          Mass resolved in axilla. RTC prn.  If  around pacemaker should see cardiology    Da Castellanos MD  [facility]

## 2017-04-09 ENCOUNTER — TELEPHONE (OUTPATIENT)
Dept: NURSING | Facility: CLINIC | Age: 77
End: 2017-04-09

## 2017-04-09 ENCOUNTER — HOSPITAL ENCOUNTER (EMERGENCY)
Facility: CLINIC | Age: 77
Discharge: HOME | End: 2017-04-09
Attending: EMERGENCY MEDICINE | Admitting: EMERGENCY MEDICINE
Payer: MEDICARE

## 2017-04-09 VITALS
RESPIRATION RATE: 18 BRPM | OXYGEN SATURATION: 96 % | DIASTOLIC BLOOD PRESSURE: 84 MMHG | SYSTOLIC BLOOD PRESSURE: 143 MMHG | WEIGHT: 177 LBS | TEMPERATURE: 98 F | BODY MASS INDEX: 30.38 KG/M2

## 2017-04-09 DIAGNOSIS — N17.9 ACUTE-ON-CHRONIC KIDNEY INJURY (H): ICD-10-CM

## 2017-04-09 DIAGNOSIS — N18.9 ACUTE-ON-CHRONIC KIDNEY INJURY (H): ICD-10-CM

## 2017-04-09 LAB
ALBUMIN SERPL-MCNC: 4 G/DL (ref 3.4–5)
ALP SERPL-CCNC: 82 U/L (ref 40–150)
ALT SERPL W P-5'-P-CCNC: 20 U/L (ref 0–50)
ANION GAP SERPL CALCULATED.3IONS-SCNC: 10 MMOL/L (ref 3–14)
AST SERPL W P-5'-P-CCNC: 14 U/L (ref 0–45)
BASOPHILS # BLD AUTO: 0.1 10E9/L (ref 0–0.2)
BASOPHILS NFR BLD AUTO: 0.8 %
BILIRUB SERPL-MCNC: 0.2 MG/DL (ref 0.2–1.3)
BUN SERPL-MCNC: 35 MG/DL (ref 7–30)
CALCIUM SERPL-MCNC: 9.2 MG/DL (ref 8.5–10.1)
CHLORIDE SERPL-SCNC: 107 MMOL/L (ref 94–109)
CO2 SERPL-SCNC: 22 MMOL/L (ref 20–32)
CREAT SERPL-MCNC: 1.74 MG/DL (ref 0.52–1.04)
DIFFERENTIAL METHOD BLD: ABNORMAL
EOSINOPHIL # BLD AUTO: 0.2 10E9/L (ref 0–0.7)
EOSINOPHIL NFR BLD AUTO: 2.2 %
ERYTHROCYTE [DISTWIDTH] IN BLOOD BY AUTOMATED COUNT: 13 % (ref 10–15)
GFR SERPL CREATININE-BSD FRML MDRD: 28 ML/MIN/1.7M2
GLUCOSE SERPL-MCNC: 111 MG/DL (ref 70–99)
HCT VFR BLD AUTO: 36.1 % (ref 35–47)
HGB BLD-MCNC: 11.9 G/DL (ref 11.7–15.7)
IMM GRANULOCYTES # BLD: 0 10E9/L (ref 0–0.4)
IMM GRANULOCYTES NFR BLD: 0.4 %
LYMPHOCYTES # BLD AUTO: 1.7 10E9/L (ref 0.8–5.3)
LYMPHOCYTES NFR BLD AUTO: 22.9 %
MCH RBC QN AUTO: 31.9 PG (ref 26.5–33)
MCHC RBC AUTO-ENTMCNC: 33 G/DL (ref 31.5–36.5)
MCV RBC AUTO: 97 FL (ref 78–100)
MONOCYTES # BLD AUTO: 0.6 10E9/L (ref 0–1.3)
MONOCYTES NFR BLD AUTO: 8 %
NEUTROPHILS # BLD AUTO: 4.8 10E9/L (ref 1.6–8.3)
NEUTROPHILS NFR BLD AUTO: 65.7 %
PLATELET # BLD AUTO: 255 10E9/L (ref 150–450)
POTASSIUM SERPL-SCNC: 5.2 MMOL/L (ref 3.4–5.3)
PROT SERPL-MCNC: 7.4 G/DL (ref 6.8–8.8)
RBC # BLD AUTO: 3.73 10E12/L (ref 3.8–5.2)
SODIUM SERPL-SCNC: 139 MMOL/L (ref 133–144)
WBC # BLD AUTO: 7.4 10E9/L (ref 4–11)

## 2017-04-09 PROCEDURE — 99285 EMERGENCY DEPT VISIT HI MDM: CPT | Performed by: EMERGENCY MEDICINE

## 2017-04-09 PROCEDURE — 25000128 H RX IP 250 OP 636: Performed by: EMERGENCY MEDICINE

## 2017-04-09 PROCEDURE — 85025 COMPLETE CBC W/AUTO DIFF WBC: CPT | Performed by: EMERGENCY MEDICINE

## 2017-04-09 PROCEDURE — 96374 THER/PROPH/DIAG INJ IV PUSH: CPT

## 2017-04-09 PROCEDURE — 99285 EMERGENCY DEPT VISIT HI MDM: CPT | Mod: 25

## 2017-04-09 PROCEDURE — 80053 COMPREHEN METABOLIC PANEL: CPT | Performed by: EMERGENCY MEDICINE

## 2017-04-09 PROCEDURE — 96361 HYDRATE IV INFUSION ADD-ON: CPT | Mod: 59

## 2017-04-09 RX ORDER — LORAZEPAM 2 MG/ML
0.5 INJECTION INTRAMUSCULAR ONCE
Status: COMPLETED | OUTPATIENT
Start: 2017-04-09 | End: 2017-04-09

## 2017-04-09 RX ORDER — ONDANSETRON 2 MG/ML
4 INJECTION INTRAMUSCULAR; INTRAVENOUS ONCE
Status: DISCONTINUED | OUTPATIENT
Start: 2017-04-09 | End: 2017-04-10 | Stop reason: HOSPADM

## 2017-04-09 RX ADMIN — LORAZEPAM 0.5 MG: 2 INJECTION INTRAMUSCULAR; INTRAVENOUS at 21:15

## 2017-04-09 RX ADMIN — SODIUM CHLORIDE 1000 ML: 9 INJECTION, SOLUTION INTRAVENOUS at 21:15

## 2017-04-09 ASSESSMENT — ENCOUNTER SYMPTOMS
ENDOCRINE NEGATIVE: 1
PSYCHIATRIC NEGATIVE: 1
EYES NEGATIVE: 1
MUSCULOSKELETAL NEGATIVE: 1
ALLERGIC/IMMUNOLOGIC NEGATIVE: 1
CARDIOVASCULAR NEGATIVE: 1
RESPIRATORY NEGATIVE: 1
CONSTITUTIONAL NEGATIVE: 1
HEMATOLOGIC/LYMPHATIC NEGATIVE: 1
DIARRHEA: 1
ABDOMINAL PAIN: 1

## 2017-04-09 NOTE — ED AVS SNAPSHOT
[facility] Emergency Department    [address] [address]    Phone:  [phone]    Fax:  [phone]                                       Aditi Palacios   MRN: [id number]    Department:  [facility] Emergency Department   Date of Visit:  4/9/2017           After Visit Summary Signature Page     I have received my discharge instructions, and my questions have been answered. I have discussed any challenges I see with this plan with the nurse or doctor.    ..........................................................................................................................................  Patient/Patient Representative Signature      ..........................................................................................................................................  Patient Representative Print Name and Relationship to Patient    ..................................................               ................................................  Date                                            Time    ..........................................................................................................................................  Reviewed by Signature/Title    ...................................................              ..............................................  Date                                                            Time

## 2017-04-09 NOTE — ED AVS SNAPSHOT
[facility] Emergency Department    [address] [address]    Phone:  [phone]    Fax:  [phone]                                       Aditi Palacios   MRN: [id number]    Department:  [facility] Emergency Department   Date of Visit:  4/9/2017           Patient Information     Date Of Birth          1940        Your diagnoses for this visit were:     Food poisoning, accidental or unintentional, initial encounter        You were seen by Sandro Wilson MD.      Follow-up Information     Follow up with Parveen Short MD. Schedule an appointment as soon as possible for a visit in 2 days.    Specialty:  Internal Medicine    Why:  As needed, If symptoms worsen    Contact information:    [facility]  [address] [facility] [address]  [phone]          Discharge Instructions         Foodborne Illness (Food Poisoning)  Foodborne disease occurs when foods aren't cooked, handled, or stored properly and become contaminated with harmful bacteria, viruses, parasites, or toxins (poisons). Foodborne disease can cause severe vomiting, diarrhea, and stomach cramps. Symptoms may not appear for 24 to 48 hours.  When to go to the emergency room (ER)  Call 911 or your local emergency number if:    You have severe symptoms, such as bloody vomit or diarrhea, or symptoms lasting more than 12 hours.    Your heart is racing, pounding, or skipping.    You are having trouble breathing.    You are elderly; have stomach or colon problems, chronic liver disease, or hemochromatosis; or have a suppressed immune system.    You suspect botulism. This is a toxin found mainly in home-canned foods. Symptoms often begin within 12 to 36 hours. They include headache, blurred vision, and muscle weakness. Botulism can be fatal. Don't delay getting help.    You have signs of dehydration such as excessive thirst, dizziness, or lightheadedness.  What to expect in the ER  A doctor  will ask you about your illness and examine you carefully. Your blood pressure, pulse, breathing rate, and temperature will be checked. A sample of your stool may be tested for bacteria. There are many types of foodborne diseases, so treatment will depend on your symptoms. You ll likely be given fluids through a vein in your arm. This helps replace water and minerals lost with vomiting and diarrhea. You may be admitted to the hospital if your symptoms are very severe.  If you have a mild foodborne illness    Rest and drink plenty of liquids.    Avoid solid foods until you feel better.    Don t take antidiarrheal medications unless your doctor tells you to.     [id number] [employer]. [address] [place] [address], [place] [address]. All rights reserved. This information is not intended as a substitute for professional medical care. Always follow your healthcare professional's instructions.          24 Hour Appointment Hotline       To make an appointment at any [facility], call [phone] ([phone]). If you don't have a family doctor or clinic, we will help you find one. [facility] are conveniently located to serve the needs of you and your family.             Review of your medicines      Our records show that you are taking the medicines listed below. If these are incorrect, please call your family doctor or clinic.        Dose / Directions Last dose taken    ALPRAZolam 0.5 MG tablet   Commonly known as:  XANAX   Dose:  0.5 mg   Quantity:  45 tablet        Take 0.5 mg by mouth 3 times daily   Refills:  0        aspirin 81 MG chewable tablet   Dose:  81 mg        Take 81 mg by mouth every evening   Refills:  0        brinzolamide-brimonidine 1-0.2 % ophthalmic suspension   Commonly known as:  SIMBRINZA   Dose:  1 drop        Place 1 drop into both eyes 2 times daily   Refills:  0        carvedilol 12.5 MG tablet   Commonly known as:  COREG        take 1 tablet in am, 2 tablets in pm and 1  tablet at night   Refills:  0        diltiazem 240 MG 24 hr ER beaded capsule   Commonly known as:  TIAZAC   Dose:  240 mg        Take 240 mg by mouth every evening   Refills:  0        FUROSEMIDE PO   Dose:  20 mg        Take 20 mg by mouth daily Am and afternoon   Refills:  0        HYDROcodone-acetaminophen 5-325 MG per tablet   Commonly known as:  NORCO   Dose:  1 tablet   Quantity:  1 tablet        Take 1 tablet by mouth Take 1 to 2 tablets every 6 hours as needed   Refills:  0        hypromellose 0.3 % Gel ophthalmic gel   Commonly known as:  GENTEAL   Dose:  1-2 drop        Place 1-2 drops into both eyes At Bedtime   Refills:  0        LEVOTHYROXINE SODIUM PO   Dose:  88 mcg        Take 88 mcg by mouth daily   Refills:  0        lidocaine 5 % Patch   Commonly known as:  LIDODERM   Dose:  1-3 patch        Place 1-3 patches onto the skin every 24 hours 12 hours on, 12 hours off   Refills:  0        LISINOPRIL PO        Take 5 mg in am and take 20 mg at bedtime   Refills:  0        MELATONIN PO   Dose:  5 mg        Take 5 mg by mouth At Bedtime   Refills:  0        omeprazole 20 MG tablet   Dose:  20 mg   Quantity:  30 tablet        Take 1 tablet (20 mg) by mouth daily Take 30-60 minutes before a meal.   Refills:  1        polyethylene glycol powder   Commonly known as:  MIRALAX/GLYCOLAX   Dose:  1 capful        Take 1 capful by mouth every evening as needed for constipation   Refills:  0        PRAVASTATIN SODIUM PO   Dose:  10 mg        Take 10 mg by mouth every evening   Refills:  0        psyllium 58.6 % Powd   Commonly known as:  METAMUCIL   Dose:  3 teaspoonful        Take 3 teaspoonful by mouth every evening as needed for constipation = 1 Tablespoons   Refills:  0        SPIRONOLACTONE PO   Dose:  12.5 mg        Take 12.5 mg by mouth daily   Refills:  0        SYSTANE ULTRA 0.4-0.3 % Soln ophthalmic solution   Dose:  1 drop   Generic drug:  polyethylene glycol 0.4%- propylene glycol 0.3%        Place 1  drop into both eyes every hour as needed for dry eyes   Refills:  0        TAMBOCOR PO   Dose:  50 mg        Take 50 mg by mouth 2 times daily   Refills:  0        VITAMIN D3 PO   Dose:  1000 Units        Take 1,000 Units by mouth daily   Refills:  0        WARFARIN SODIUM PO   Dose:  5 mg        Take 5 mg by mouth every evening   Refills:  0        XALATAN 0.005 % ophthalmic solution   Generic drug:  latanoprost        1 drop in both eyes daily at bedtime   Refills:  0                Procedures and tests performed during your visit     CBC with platelets differential    Comprehensive metabolic panel      Orders Needing Specimen Collection     None      Pending Results     No orders found from 4/7/2017 to 4/10/2017.            Pending Culture Results     No orders found from 4/7/2017 to 4/10/2017.            Test Results From Your Hospital Stay        4/9/2017  9:41 PM      Component Results     Component Value Ref Range & Units Status    WBC 7.4 4.0 - 11.0 10e9/L Final    RBC Count 3.73 (L) 3.8 - 5.2 10e12/L Final    Hemoglobin 11.9 11.7 - 15.7 g/dL Final    Hematocrit 36.1 35.0 - 47.0 % Final    MCV 97 78 - 100 fl Final    MCH 31.9 26.5 - 33.0 pg Final    MCHC 33.0 31.5 - 36.5 g/dL Final    RDW 13.0 10.0 - 15.0 % Final    Platelet Count 255 150 - 450 10e9/L Final    Diff Method Automated Method  Final    % Neutrophils 65.7 % Final    % Lymphocytes 22.9 % Final    % Monocytes 8.0 % Final    % Eosinophils 2.2 % Final    % Basophils 0.8 % Final    % Immature Granulocytes 0.4 % Final    Absolute Neutrophil 4.8 1.6 - 8.3 10e9/L Final    Absolute Lymphocytes 1.7 0.8 - 5.3 10e9/L Final    Absolute Monocytes 0.6 0.0 - 1.3 10e9/L Final    Absolute Eosinophils 0.2 0.0 - 0.7 10e9/L Final    Absolute Basophils 0.1 0.0 - 0.2 10e9/L Final    Abs Immature Granulocytes 0.0 0 - 0.4 10e9/L Final         4/9/2017  9:53 PM      Component Results     Component Value Ref Range & Units Status    Sodium 139 133 - 144 mmol/L Final     "Potassium 5.2 3.4 - 5.3 mmol/L Final    Chloride 107 94 - 109 mmol/L Final    Carbon Dioxide 22 20 - 32 mmol/L Final    Anion Gap 10 3 - 14 mmol/L Final    Glucose 111 (H) 70 - 99 mg/dL Final    Urea Nitrogen 35 (H) 7 - 30 mg/dL Final    Creatinine 1.74 (H) 0.52 - 1.04 mg/dL Final    GFR Estimate 28 (L) >60 mL/min/1.7m2 Final    Non  GFR Calc    GFR Estimate If Black 34 (L) >60 mL/min/1.7m2 Final    African American GFR Calc    Calcium 9.2 8.5 - 10.1 mg/dL Final    Bilirubin Total 0.2 0.2 - 1.3 mg/dL Final    Albumin 4.0 3.4 - 5.0 g/dL Final    Protein Total 7.4 6.8 - 8.8 g/dL Final    Alkaline Phosphatase 82 40 - 150 U/L Final    ALT 20 0 - 50 U/L Final    AST 14 0 - 45 U/L Final                Thank you for choosing [facility]       Thank you for choosing [facility] for your care. Our goal is always to provide you with excellent care. Hearing back from our patients is one way we can continue to improve our services. Please take a few minutes to complete the written survey that you may receive in the mail after you visit with us. Thank you!        [employer] Information     [facility] lets you send messages to your doctor, view your test results, renew your prescriptions, schedule appointments and more. To sign up, go to [email or web address] . Click on \"Log in\" on the left side of the screen, which will take you to the Welcome page. Then click on \"Sign up Now\" on the right side of the page.     You will be asked to enter the access code listed below, as well as some personal information. Please follow the directions to create your username and password.     Your access code is: [email or web address]  Expires: 2017  3:37 PM     Your access code will  in 90 days. If you need help or a new code, please call your [facility] clinic or [phone].        Care EveryWhere ID     This is your Care EveryWhere ID. This could be used by other organizations to access your [phone]      "   After Visit Summary       This is your record. Keep this with you and show to your community pharmacist(s) and doctor(s) at your next visit.

## 2017-04-10 NOTE — TELEPHONE ENCOUNTER
"Call Type: Triage Call    Presenting Problem: Patient calling to report diarrhea onset 3 hours  ago. Patient completly incontinent of stool \"it just comes out of  me\". She is not able to make it to the bathroom and has had several  accidents.  She believes it is related to food she ate at a  restaurant. With patients age, history of kidney disease recommended  evaluation in emergency room.  Triage Note:  Guideline Title: Diarrhea or Other Change in Bowel Habits ; Diarrhea or  Other Change in Bowel Habits  Recommended Disposition: Provide Home/Self Care  Original Inclination: Did not know what to do  Override Disposition: See ED Immediately  Intended Action: Go to Hospital / ED  Physician Contacted: No  All other situations ?  YES  Signs of dehydration ? NO  Using laxatives for weight loss ? NO  New or worsening signs and symptoms that may indicate shock ? NO  New onset of diarrhea AND any temperature elevation in an immunocompromised  individual or frail elderly ? NO  Severe pain/cramping in abdomen or rectum that interferes with ability to carry  out normal activities or sleep ? NO  Diarrhea associated with new abdominal bloating, distension or swelling ? NO  Evaluated by provider AND symptoms not improving or worsening with suggested  treatment or home care ? NO  Change in character of bowel movements ? NO  Diarrhea occurs only when eating certain foods ? NO  Diarrhea lasting longer than 72 hours AND not improving with home care ? NO  Receiving intermittent or continuous tube feedings AND not responding to 24 hours  of home care ? NO  Alternating constipation and diarrhea AND not previously evaluated ? NO  Symptoms worsening despite provider recommended treatment AND known to have  irritable bowel syndrome, pancreatic insufficiency, inflammatory bowel disease,  celiac disease or malabsorption syndrome ? NO  Passing red, black or tarry material from rectum AND onset of new signs and  symptoms of hypovolemia ? " NO  Diarrhea (without blood in stool) following crampy abdominal pain AND repeated  vomiting that has not improved with 3 days of home care ? NO  New onset diarrhea with any of the following: mild abdominal cramping, generalized  aching, temperature up to 101.5 F (38.6 C) or several episodes of nausea/vomiting  ? NO  Sudden onset of diarrhea, usually with abdominal pain, nausea and sometimes  vomiting, occurring within 36 hours after eating unpasteurized, raw or  undercooked foods OR drinking unpurified or nonchlorinated water ? NO  Worms visible in stool/toilet AND not previously evaluated ? NO  Rectal bleeding (blood in or on the stool, more than scant; black tarry stools) ?  NO  Associated with anxiety, emotional stress, or sleep disturbances ? NO  Diarrhea began 3-5 days after starting an antibiotic ? NO  Bloody diarrhea AND has not been evaluated ? NO  Diarrhea began after any gastrointestinal (GI) surgery or procedure and is lasting  longer than defined in provider specified discharge information ? NO  Known diabetic and diarrhea or other change in bowel habits ? NO  Symptoms began after a change or starting a new prescription or nonprescription  medicine or alternative medicine / therapy within last 4 weeks ? NO  High to low (but not zero) risk of exposure to Ebola within the past 21 days ? NO  Traveled out of country in past 2 months and new onset of unexplained symptom(s) ?  NO  Debilitated or bedridden patient having uncontrolled bowel movements AND has  history of stool impaction ? NO  New onset of diarrhea with temperature of 101.5 F (38.6 C) or greater AND symptoms  not improving with 12 hours of home care ? NO  Diarrhea began with current or recent radiation or chemotherapy AND is not  improving with 24 hours of home care ? NO  Physician Instructions:  Care Advice: SYMPTOM / CONDITION MANAGEMENT  Diarrheal Care - Intake: - Required fluid intake is based on individual  needs. If you are taking in enough  fluid to maintain hydration urine will  be light yellow in color. - Drink water, over the counter oral hydration  solution, sports drinks, diluted fruit juice, and soft drinks supplemented  with broth and soup to replace the fluid and sodium losses associated with  diarrhea. Drink 1 cup of fluid each time you have a loose bowel movement.  - If you are also vomiting, take the fluids in frequent small sips or suck  on ice chips. - Continue to eat solid foods. Eat easily digested foods  (such as bananas, rice, applesauce, toast, cooked cereals, soup, crackers,  baked or boiled potato, or baked chicken or turkey without skin for a  couple of days). - High fiber, high fat, high sugar content foods, or  highly seasoned foods may worsen diarrhea. - Do not drink caffeinated or  alcoholic beverages. - Avoid milk and milk products containing lactose  while having symptoms. As symptoms improve, gradually add back to your  diet. Yogurt is more easily tolerated.  Diarrheal Care - Medications - Antidiarrheal medications are usually  unnecessary. If symptoms are severe, consider nonprescription antidiarrheal  and anti-motility drugs as directed by label or a provider. Do not take  these medications if have high fever or bloody diarrhea. If pregnant, do  not take any medications not approved by your provider. - Consult your  provider for advice regarding continuing prescription medication.

## 2017-04-10 NOTE — ED PROVIDER NOTES
"  History     Chief Complaint   Patient presents with     Diarrhea     \"uncontrollable diarrhea started 4-5 hours ago\"     HPI  Aditi Palacios is a 77 year old female with a history of stage III chronic kidney disease, who presents for evaluation for diarrhea and abdominal cramping discomfort which she feels due to food poisoning.  She had dinner at [employer] in [facility] last night.  Over the last several hours she developed abdominal cramping and discomfort with loose stools.  She reports no blood in her stool.  No fever.  No flank pain or back pain.  Her son who ate the same dish at [employer] also has similar symptoms of abdominal cramping and diarrhea.  She thinks she may have gotten food poisoning from the DIP she ate with chips.    Social history: Lives in [place], [place]. Here in ED with spouse by private car.    Past medical history:  Patient Active Problem List   Diagnosis     Myalgia     Acute kidney injury (H)     GI bleed     Breast mass, right     Medications:  Current Facility-Administered Medications   Medication     ondansetron (ZOFRAN) injection 4 mg     Current Outpatient Prescriptions   Medication     LISINOPRIL PO     MELATONIN PO     HYDROcodone-acetaminophen (NORCO) 5-325 MG per tablet     ALPRAZolam (XANAX) 0.5 MG tablet     omeprazole 20 MG tablet     aspirin 81 MG chewable tablet     FUROSEMIDE PO     hypromellose (GENTEAL) ophthalmic gel 0.3%     polyethylene glycol 0.4%- propylene glycol 0.3% (SYSTANE ULTRA) 0.4-0.3 % SOLN ophthalmic solution     polyethylene glycol (MIRALAX/GLYCOLAX) powder     psyllium (METAMUCIL) 58.6 % POWD     Cholecalciferol (VITAMIN D3 PO)     brinzolamide-brimonidine (SIMBRINZA) 1-0.2 % ophthalmic suspension     diltiazem (TIAZAC) 240 MG CP24     Flecainide Acetate (TAMBOCOR PO)     LEVOTHYROXINE SODIUM PO     lidocaine (LIDODERM) 5 % patch     PRAVASTATIN SODIUM PO     SPIRONOLACTONE PO     WARFARIN SODIUM PO     CARVEDILOL 12.5 MG OR TABS     XALATAN 0.005 % OP " SOLN     Allergies:     Allergies   Allergen Reactions     Amitriptyline      Atenolol      Codeine      Coreg [Carvedilol]      CR only     Crestor [Rosuvastatin]      Doxycycline      Fentanyl      Lotrel      Lyrica [Pregabalin]      Niaspan [Niacin]      No Clinical Screening - See Comments      Fentanyl/Versed combination     Other [Seasonal Allergies]      Beta blockers except for carvedilol     Percocet [Oxycodone-Acetaminophen]      Percodan [Oxycodone-Aspirin]      Premarin      Questran [Cholestyramine]      Tape [Adhesive Tape]      Tramadol      Vioxx      Zocor [Simvastatin]      I have reviewed the Medications, Allergies, Past Medical and Surgical History, and Social History in the Epic system.    Review of Systems   Constitutional: Negative.    HENT: Negative.    Eyes: Negative.    Respiratory: Negative.    Cardiovascular: Negative.    Gastrointestinal: Positive for abdominal pain and diarrhea.   Endocrine: Negative.    Genitourinary: Negative.    Musculoskeletal: Negative.    Skin: Negative.    Allergic/Immunologic: Negative.    Hematological: Negative.    Psychiatric/Behavioral: Negative.        Physical Exam   BP: 184/48  Heart Rate: 60  Temp: 97.4  F (36.3  C)  Resp: 18  Weight: 80.3 kg (177 lb)  SpO2: 94 %  Physical Exam   Constitutional: She is oriented to person, place, and time. She appears well-developed and well-nourished. No distress.   HENT:   Head: Normocephalic and atraumatic.   Eyes: Conjunctivae and EOM are normal. Pupils are equal, round, and reactive to light. Right eye exhibits no discharge. Left eye exhibits no discharge. No scleral icterus.   Neck: Normal range of motion. Neck supple. No JVD present. No tracheal deviation present. No thyromegaly present.   Cardiovascular: Normal rate and regular rhythm.    Pulmonary/Chest: Effort normal and breath sounds normal. No stridor.   Abdominal: Soft.   Lymphadenopathy:     She has no cervical adenopathy.   Neurological: She is alert and  oriented to person, place, and time.   Skin: No rash noted. She is not diaphoretic. No erythema. No pallor.   Psychiatric: She has a normal mood and affect. Her behavior is normal. Judgment and thought content normal.       ED Course     ED Course     Procedures             Critical Care time:  none                 ED medications:  Medications   ondansetron (ZOFRAN) injection 4 mg (4 mg Intravenous Not Given 4/9/17 2234)   0.9% sodium chloride BOLUS (0 mLs Intravenous Stopped 4/9/17 2235)   LORazepam (ATIVAN) injection 0.5 mg (0.5 mg Intravenous Given 4/9/17 2115)       ED labs and imaging:  Results for orders placed or performed during the hospital encounter of 04/09/17   CBC with platelets differential   Result Value Ref Range    WBC 7.4 4.0 - 11.0 10e9/L    RBC Count 3.73 (L) 3.8 - 5.2 10e12/L    Hemoglobin 11.9 11.7 - 15.7 g/dL    Hematocrit 36.1 35.0 - 47.0 %    MCV 97 78 - 100 fl    MCH 31.9 26.5 - 33.0 pg    MCHC 33.0 31.5 - 36.5 g/dL    RDW 13.0 10.0 - 15.0 %    Platelet Count 255 150 - 450 10e9/L    Diff Method Automated Method     % Neutrophils 65.7 %    % Lymphocytes 22.9 %    % Monocytes 8.0 %    % Eosinophils 2.2 %    % Basophils 0.8 %    % Immature Granulocytes 0.4 %    Absolute Neutrophil 4.8 1.6 - 8.3 10e9/L    Absolute Lymphocytes 1.7 0.8 - 5.3 10e9/L    Absolute Monocytes 0.6 0.0 - 1.3 10e9/L    Absolute Eosinophils 0.2 0.0 - 0.7 10e9/L    Absolute Basophils 0.1 0.0 - 0.2 10e9/L    Abs Immature Granulocytes 0.0 0 - 0.4 10e9/L   Comprehensive metabolic panel   Result Value Ref Range    Sodium 139 133 - 144 mmol/L    Potassium 5.2 3.4 - 5.3 mmol/L    Chloride 107 94 - 109 mmol/L    Carbon Dioxide 22 20 - 32 mmol/L    Anion Gap 10 3 - 14 mmol/L    Glucose 111 (H) 70 - 99 mg/dL    Urea Nitrogen 35 (H) 7 - 30 mg/dL    Creatinine 1.74 (H) 0.52 - 1.04 mg/dL    GFR Estimate 28 (L) >60 mL/min/1.7m2    GFR Estimate If Black 34 (L) >60 mL/min/1.7m2    Calcium 9.2 8.5 - 10.1 mg/dL    Bilirubin Total 0.2 0.2 -  1.3 mg/dL    Albumin 4.0 3.4 - 5.0 g/dL    Protein Total 7.4 6.8 - 8.8 g/dL    Alkaline Phosphatase 82 40 - 150 U/L    ALT 20 0 - 50 U/L    AST 14 0 - 45 U/L       ED Vitals:  Vitals:    04/09/17 2023 04/09/17 2150 04/09/17 2151   BP: 184/48 143/84    Resp: 18     Temp: 97.4  F (36.3  C) 98  F (36.7  C)    TempSrc: Oral Oral    SpO2: 94%  96%   Weight: 80.3 kg (177 lb)       Assessments & Plan (with Medical Decision Making)   Clinical impression: 77 year old female who presents for diarrhea and abdominal cramping likely due to food poisoning with foodborne illness after eating dinner at [employer] last night. She has acute kidney injury likely related to GI loss with diarrhea. She has a history of stage III kidney disease.  She reports no fever.  No bloody stools.  She was hypertensive in triage.  Hyperactive bowel sounds.    ED course and Plan:  She was given IV fluids.  She was given IV Ativan to help with abdominal cramping and discomfort.  She was offered Zofran. She has a normal white count, normal liver enzymes,normal electrolytes, normal hemogram. She reported marked improvement in her symptoms during her course of care in the ED. She had no diarrhea during her course of care. Patient has acute on chronic kidney injury today with a creatinine of 1.7, baseline is 1.4-1.5. We reviewed supportive care. We discussed when stool testing would be medically appropriate. We discussed reasons to return to the ED for care. Patient and  were comfortable with plan of care.      Disclaimer: This note consists of symbols derived from keyboarding, dictation and/or voice recognition software. As a result, there may be errors in the script that have gone undetected. Please consider this when interpreting information found in this chart.  I have reviewed the nursing notes.    I have reviewed the findings, diagnosis, plan and need for follow up with the patient.    New Prescriptions    No medications on file       Final  diagnoses:   Food poisoning, accidental or unintentional, initial encounter   Acute-on-chronic kidney injury (H) - CKD- stage-3. baseline Creatinine is 1.4-1.5, GFR-34. Creatinine today is 1.7, GFR-28       4/9/2017   [facility] EMERGENCY DEPARTMENT     Sandro Wilson MD  04/09/17 2336

## 2017-04-10 NOTE — DISCHARGE INSTRUCTIONS
Foodborne Illness (Food Poisoning)  Foodborne disease occurs when foods aren't cooked, handled, or stored properly and become contaminated with harmful bacteria, viruses, parasites, or toxins (poisons). Foodborne disease can cause severe vomiting, diarrhea, and stomach cramps. Symptoms may not appear for 24 to 48 hours.  When to go to the emergency room (ER)  Call 911 or your local emergency number if:    You have severe symptoms, such as bloody vomit or diarrhea, or symptoms lasting more than 12 hours.    Your heart is racing, pounding, or skipping.    You are having trouble breathing.    You are elderly; have stomach or colon problems, chronic liver disease, or hemochromatosis; or have a suppressed immune system.    You suspect botulism. This is a toxin found mainly in home-canned foods. Symptoms often begin within 12 to 36 hours. They include headache, blurred vision, and muscle weakness. Botulism can be fatal. Don't delay getting help.    You have signs of dehydration such as excessive thirst, dizziness, or lightheadedness.  What to expect in the ER  A doctor will ask you about your illness and examine you carefully. Your blood pressure, pulse, breathing rate, and temperature will be checked. A sample of your stool may be tested for bacteria. There are many types of foodborne diseases, so treatment will depend on your symptoms. You ll likely be given fluids through a vein in your arm. This helps replace water and minerals lost with vomiting and diarrhea. You may be admitted to the hospital if your symptoms are very severe.  If you have a mild foodborne illness    Rest and drink plenty of liquids.    Avoid solid foods until you feel better.    Don t take antidiarrheal medications unless your doctor tells you to.     7803-1409 [employer]. [address] [place] [address], [place] [address]. All rights reserved. This information is not intended as a substitute for professional medical care. Always follow  your healthcare professional's instructions.

## 2017-04-10 NOTE — ED NOTES
Pt started having diarrhea this afternoon at about 1530, pt ate at a restaurant yesterday afternoon. Pt was nauseated this morning, took a zofran and has not felt nauseated since. No vomiting. Pt has had numerous episodes of diarrhea, kevin rectal area is red and painful. Barrier cream put on since arrival to ER. Pt says she is feeling weak.

## 2017-04-10 NOTE — ED NOTES
Pt says she feels well enough to go home, has not had any diarrhea for over an hour. MD notified, is in talking to pt.

## 2017-05-01 ENCOUNTER — RECORDS - HEALTHEAST (OUTPATIENT)
Dept: ADMINISTRATIVE | Facility: OTHER | Age: 77
End: 2017-05-01

## 2018-12-29 ENCOUNTER — HOSPITAL ENCOUNTER (EMERGENCY)
Facility: CLINIC | Age: 78
Discharge: HOME | End: 2018-12-29
Attending: FAMILY MEDICINE | Admitting: FAMILY MEDICINE
Payer: MEDICARE

## 2018-12-29 VITALS
HEART RATE: 60 BPM | OXYGEN SATURATION: 97 % | DIASTOLIC BLOOD PRESSURE: 51 MMHG | SYSTOLIC BLOOD PRESSURE: 163 MMHG | BODY MASS INDEX: 29.18 KG/M2 | WEIGHT: 170 LBS | TEMPERATURE: 98 F | RESPIRATION RATE: 16 BRPM

## 2018-12-29 DIAGNOSIS — E86.0 DEHYDRATION: ICD-10-CM

## 2018-12-29 LAB
ALBUMIN SERPL-MCNC: 3.7 G/DL (ref 3.4–5)
ALBUMIN UR-MCNC: NEGATIVE MG/DL
ALP SERPL-CCNC: 81 U/L (ref 40–150)
ALT SERPL W P-5'-P-CCNC: 17 U/L (ref 0–50)
ANION GAP SERPL CALCULATED.3IONS-SCNC: 8 MMOL/L (ref 3–14)
APPEARANCE UR: CLEAR
AST SERPL W P-5'-P-CCNC: 13 U/L (ref 0–45)
BASOPHILS # BLD AUTO: 0.1 10E9/L (ref 0–0.2)
BASOPHILS NFR BLD AUTO: 0.9 %
BILIRUB SERPL-MCNC: 0.3 MG/DL (ref 0.2–1.3)
BILIRUB UR QL STRIP: NEGATIVE
BUN SERPL-MCNC: 27 MG/DL (ref 7–30)
CALCIUM SERPL-MCNC: 9 MG/DL (ref 8.5–10.1)
CHLORIDE SERPL-SCNC: 104 MMOL/L (ref 94–109)
CO2 SERPL-SCNC: 23 MMOL/L (ref 20–32)
COLOR UR AUTO: NORMAL
CREAT SERPL-MCNC: 1.18 MG/DL (ref 0.52–1.04)
DIFFERENTIAL METHOD BLD: ABNORMAL
EOSINOPHIL # BLD AUTO: 0.1 10E9/L (ref 0–0.7)
EOSINOPHIL NFR BLD AUTO: 2.3 %
ERYTHROCYTE [DISTWIDTH] IN BLOOD BY AUTOMATED COUNT: 12.4 % (ref 10–15)
GFR SERPL CREATININE-BSD FRML MDRD: 44 ML/MIN/{1.73_M2}
GLUCOSE SERPL-MCNC: 101 MG/DL (ref 70–99)
GLUCOSE UR STRIP-MCNC: NEGATIVE MG/DL
HCT VFR BLD AUTO: 34.7 % (ref 35–47)
HGB BLD-MCNC: 11.2 G/DL (ref 11.7–15.7)
HGB UR QL STRIP: NEGATIVE
IMM GRANULOCYTES # BLD: 0 10E9/L (ref 0–0.4)
IMM GRANULOCYTES NFR BLD: 0.3 %
KETONES UR STRIP-MCNC: NEGATIVE MG/DL
LEUKOCYTE ESTERASE UR QL STRIP: NEGATIVE
LYMPHOCYTES # BLD AUTO: 1.5 10E9/L (ref 0.8–5.3)
LYMPHOCYTES NFR BLD AUTO: 25.7 %
MCH RBC QN AUTO: 31.8 PG (ref 26.5–33)
MCHC RBC AUTO-ENTMCNC: 32.3 G/DL (ref 31.5–36.5)
MCV RBC AUTO: 99 FL (ref 78–100)
MONOCYTES # BLD AUTO: 0.5 10E9/L (ref 0–1.3)
MONOCYTES NFR BLD AUTO: 8 %
NEUTROPHILS # BLD AUTO: 3.6 10E9/L (ref 1.6–8.3)
NEUTROPHILS NFR BLD AUTO: 62.8 %
NITRATE UR QL: NEGATIVE
NRBC # BLD AUTO: 0 10*3/UL
NRBC BLD AUTO-RTO: 0 /100
PH UR STRIP: 5 PH (ref 5–7)
PLATELET # BLD AUTO: 246 10E9/L (ref 150–450)
POTASSIUM SERPL-SCNC: 5.1 MMOL/L (ref 3.4–5.3)
PROT SERPL-MCNC: 7.2 G/DL (ref 6.8–8.8)
RBC # BLD AUTO: 3.52 10E12/L (ref 3.8–5.2)
SODIUM SERPL-SCNC: 135 MMOL/L (ref 133–144)
SOURCE: NORMAL
SP GR UR STRIP: 1 (ref 1–1.03)
UROBILINOGEN UR STRIP-MCNC: 0 MG/DL (ref 0–2)
WBC # BLD AUTO: 5.7 10E9/L (ref 4–11)

## 2018-12-29 PROCEDURE — 81003 URINALYSIS AUTO W/O SCOPE: CPT | Performed by: FAMILY MEDICINE

## 2018-12-29 PROCEDURE — 99284 EMERGENCY DEPT VISIT MOD MDM: CPT | Mod: Z6 | Performed by: FAMILY MEDICINE

## 2018-12-29 PROCEDURE — 85025 COMPLETE CBC W/AUTO DIFF WBC: CPT | Performed by: FAMILY MEDICINE

## 2018-12-29 PROCEDURE — 25000128 H RX IP 250 OP 636: Performed by: FAMILY MEDICINE

## 2018-12-29 PROCEDURE — 99284 EMERGENCY DEPT VISIT MOD MDM: CPT | Mod: 25

## 2018-12-29 PROCEDURE — 80053 COMPREHEN METABOLIC PANEL: CPT | Performed by: FAMILY MEDICINE

## 2018-12-29 PROCEDURE — 96360 HYDRATION IV INFUSION INIT: CPT

## 2018-12-29 RX ORDER — SACCHAROMYCES BOULARDII 250 MG
250 CAPSULE ORAL
COMMUNITY

## 2018-12-29 RX ADMIN — SODIUM CHLORIDE 1000 ML: 9 INJECTION, SOLUTION INTRAVENOUS at 14:32

## 2018-12-29 NOTE — ED PROVIDER NOTES
"HPI  Patient is a 78-year-old female presenting with concerns that her labs \"are off.\"  She has a known history of chronic kidney injury.  She takes numerous medications which are reviewed.  Her past medical history also includes atrial fibrillation and having had a pacemaker placed.  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    The patient reports having recurrent episodes of \"fogginess\" and mild dehydration.  She tells me that her electrolytes are frequently abnormal when she feels like this.  She denies fever.  She denies palpitations or racing heart.  She denies lightheadedness or feeling faint.  She denies nausea though she does report having a decreased appetite.  She regiments her fluid intake because of her SIADH history.  Apparently she did not take all of her required fluid over the past couple of days.  No vomiting.  No abdominal pain.  She denies diarrhea or constipation.  She denies hematochezia and melena.  She does report increased urinary frequency but denies dysuria or urgency.  She denies vaginal discharge or irritation.  She denies trauma or injury.  She denies headache.  She has felt very similar to this in the past and she \"bounces right back with a bag of fluid.\"    ROS: All other review of systems are negative other than that noted above.     Past Medical History:   Diagnosis Date     Anxiety      Atrial fibrillation (H)      Cancer (H)     non-small-cell adenocarcinoma of L lung     Fibromyalgia      Gastro-oesophageal reflux disease      Glaucoma      Hypertension      Hypothyroidism      Scoliosis      Vascular complications of mesenteric artery      Past Surgical History:   Procedure Laterality Date     BACK SURGERY      c2-5 and 5-6 fusion     Billateral common illiac artery stent       BREAST SURGERY       EXTRACAPSULAR CATARACT EXTRATION WITH INTRAOCULAR LENS IMPLANT       GYN SURGERY      total hysterectomy     IMPLANT PACEMAKER      medtronic     ORTHOPEDIC " SURGERY      L ankle     THORACOTOMY  12/18/01    L upper lobe     Family History   Problem Relation Age of Onset     Unknown/Adopted Mother      Unknown/Adopted Father      Social History     Tobacco Use     Smoking status: Never Smoker   Substance Use Topics     Alcohol use: Not on file     Drug use: Not on file         PHYSICAL  /51   Pulse 60   Temp 98  F (36.7  C) (Temporal)   Resp 16   Wt 77.1 kg (170 lb)   SpO2 97%   BMI 29.18 kg/m    General: Patient is alert and in moderate distress.  Concerned.  Neurological: Alert.  Moving upper and lower extremities equally, bilaterally.  Head / Neck: Atraumatic.  Ears: Not done.  Eyes: Pupils are equal, round, and reactive.  Normal conjunctiva.  Nose: Midline.  No epistaxis.  Mouth / Throat: No ulcerations or lesions.  Upper pharynx is not erythematous.  Moist.  Respiratory: No respiratory distress. CTA B.  Cardiovascular: Regular rhythm.  Peripheral extremities are warm.  No edema.  No calf tenderness.  Abdomen / Pelvis: Not tender.  No distention.  Soft throughout.  Genitalia: Not done.  Musculoskeletal: No tenderness over major muscles and joints.  Skin: No evidence of rash or trauma.        ED COURSE  1406.  Lab values pending.  Fluid bolus ordered.    Labs Ordered and Resulted from Time of ED Arrival Up to the Time of Departure from the ED   COMPREHENSIVE METABOLIC PANEL - Abnormal; Notable for the following components:       Result Value    Glucose 101 (*)     Creatinine 1.18 (*)     GFR Estimate 44 (*)     GFR Estimate If Black 51 (*)     All other components within normal limits   CBC WITH PLATELETS DIFFERENTIAL - Abnormal; Notable for the following components:    RBC Count 3.52 (*)     Hemoglobin 11.2 (*)     Hematocrit 34.7 (*)     All other components within normal limits   UA MACROSCOPIC WITH REFLEX TO MICRO AND CULTURE   PERIPHERAL IV CATHETER     1552.  Lab values unremarkable.  The patient will be discharged home after her fluid bolus is  complete.  Dehydration will be diagnosed.  No medication changes recommended.    Medications   0.9% sodium chloride BOLUS (1,000 mLs Intravenous New Bag 12/29/18 8467)         IMPRESSION    ICD-10-CM    1. Dehydration E86.0          Critical Care time:  none                    Florian Logan MD  12/29/18 1937

## 2018-12-29 NOTE — DISCHARGE INSTRUCTIONS
Return to the Emergency Room if the following occurs:     Worsened dehydration, new fainting, fever >101, or for any concern at anytime.    Or, follow-up with the following provider as we discussed:     Return to your primary doctor as needed.    Medications discussed:    None new.  No changes.    If you received pain-relieving or sedating medication during your time in the ER, avoid alcohol, driving automobiles, or working with machinery.  Also, a responsible adult must stay with you.        Call the Nurse Advice Line at [phone] or [phone] for any concern at anytime.

## 2018-12-29 NOTE — ED AVS SNAPSHOT
[facility] Emergency Department  [address] [place] [address]  Phone:  [phone]  Fax:  [phone]                                    Aditi Palacios   MRN: [id number]    Department:  [facility] Emergency Department   Date of Visit:  12/29/2018           After Visit Summary Signature Page    I have received my discharge instructions, and my questions have been answered. I have discussed any challenges I see with this plan with the nurse or doctor.    ..........................................................................................................................................  Patient/Patient Representative Signature      ..........................................................................................................................................  Patient Representative Print Name and Relationship to Patient    ..................................................               ................................................  Date                                   Time    ..........................................................................................................................................  Reviewed by Signature/Title    ...................................................              ..............................................  Date                                               Time          22EPIC Rev 08/18

## 2018-12-29 NOTE — ED NOTES
Shared blood results with family per their request - they are encouraged by findings today - IV fluid initiated and comfort measures offered.

## 2018-12-29 NOTE — ED NOTES
Pt states that her 'labs' have been off in the past when this feeling of weakness has been of issue.  states it is usually her sodium that is off. Patient denies any syncopal event or other issues at this time, does have a pacemaker.

## 2018-12-29 NOTE — ED NOTES
"Per , patient has some arterial flow issues on left subclavian and would like NO BP on left. Attempted to initiate IV on right and patient was discouraged as I was unable to advance IV. Asked for 'someone who is really good at IV's\" - explained that I was very comfortable getting an IV established - patient PREFERRED to have her IV in the left despite 's concerns of arterial flow and restrictions to that limb. IV established and blood obtained and sent. Patient also states that 'every time I am here, they do an EKG' - asked if she had any pain or discomfort and patient states yesterday and this morning, she had some chest pressure. Explained that I would defer to MD for further orders at this time. Patient and  are agreeable to plan.  "

## 2019-02-27 ENCOUNTER — HOSPITAL ENCOUNTER (EMERGENCY)
Facility: CLINIC | Age: 79
Discharge: HOME | End: 2019-02-27
Attending: EMERGENCY MEDICINE | Admitting: EMERGENCY MEDICINE
Payer: MEDICARE

## 2019-02-27 ENCOUNTER — APPOINTMENT (OUTPATIENT)
Dept: CT IMAGING | Facility: CLINIC | Age: 79
End: 2019-02-27
Attending: EMERGENCY MEDICINE
Payer: MEDICARE

## 2019-02-27 ENCOUNTER — NURSE TRIAGE (OUTPATIENT)
Dept: NURSING | Facility: CLINIC | Age: 79
End: 2019-02-27

## 2019-02-27 VITALS
OXYGEN SATURATION: 96 % | RESPIRATION RATE: 20 BRPM | SYSTOLIC BLOOD PRESSURE: 190 MMHG | DIASTOLIC BLOOD PRESSURE: 61 MMHG | TEMPERATURE: 98.6 F

## 2019-02-27 DIAGNOSIS — K92.2 LOWER GI BLEED: ICD-10-CM

## 2019-02-27 LAB
ALBUMIN SERPL-MCNC: 3.9 G/DL (ref 3.4–5)
ALP SERPL-CCNC: 102 U/L (ref 40–150)
ALT SERPL W P-5'-P-CCNC: 14 U/L (ref 0–50)
ANION GAP SERPL CALCULATED.3IONS-SCNC: 8 MMOL/L (ref 3–14)
AST SERPL W P-5'-P-CCNC: 11 U/L (ref 0–45)
BASOPHILS # BLD AUTO: 0 10E9/L (ref 0–0.2)
BASOPHILS NFR BLD AUTO: 0.4 %
BILIRUB SERPL-MCNC: 0.3 MG/DL (ref 0.2–1.3)
BUN SERPL-MCNC: 40 MG/DL (ref 7–30)
CALCIUM SERPL-MCNC: 8.9 MG/DL (ref 8.5–10.1)
CHLORIDE SERPL-SCNC: 106 MMOL/L (ref 94–109)
CO2 SERPL-SCNC: 19 MMOL/L (ref 20–32)
CREAT SERPL-MCNC: 1.55 MG/DL (ref 0.52–1.04)
DIFFERENTIAL METHOD BLD: ABNORMAL
EOSINOPHIL # BLD AUTO: 0.1 10E9/L (ref 0–0.7)
EOSINOPHIL NFR BLD AUTO: 0.7 %
ERYTHROCYTE [DISTWIDTH] IN BLOOD BY AUTOMATED COUNT: 12.6 % (ref 10–15)
GFR SERPL CREATININE-BSD FRML MDRD: 32 ML/MIN/{1.73_M2}
GLUCOSE SERPL-MCNC: 104 MG/DL (ref 70–99)
HCT VFR BLD AUTO: 35.9 % (ref 35–47)
HGB BLD-MCNC: 11.7 G/DL (ref 11.7–15.7)
IMM GRANULOCYTES # BLD: 0 10E9/L (ref 0–0.4)
IMM GRANULOCYTES NFR BLD: 0.4 %
INR PPP: 2.4 (ref 0.86–1.14)
LYMPHOCYTES # BLD AUTO: 1.1 10E9/L (ref 0.8–5.3)
LYMPHOCYTES NFR BLD AUTO: 14.8 %
MCH RBC QN AUTO: 32 PG (ref 26.5–33)
MCHC RBC AUTO-ENTMCNC: 32.6 G/DL (ref 31.5–36.5)
MCV RBC AUTO: 98 FL (ref 78–100)
MONOCYTES # BLD AUTO: 0.6 10E9/L (ref 0–1.3)
MONOCYTES NFR BLD AUTO: 7.2 %
NEUTROPHILS # BLD AUTO: 5.9 10E9/L (ref 1.6–8.3)
NEUTROPHILS NFR BLD AUTO: 76.5 %
NRBC # BLD AUTO: 0 10*3/UL
NRBC BLD AUTO-RTO: 0 /100
PLATELET # BLD AUTO: 252 10E9/L (ref 150–450)
POTASSIUM SERPL-SCNC: 5.1 MMOL/L (ref 3.4–5.3)
PROT SERPL-MCNC: 7.2 G/DL (ref 6.8–8.8)
RBC # BLD AUTO: 3.66 10E12/L (ref 3.8–5.2)
SODIUM SERPL-SCNC: 133 MMOL/L (ref 133–144)
WBC # BLD AUTO: 7.7 10E9/L (ref 4–11)

## 2019-02-27 PROCEDURE — 85610 PROTHROMBIN TIME: CPT | Performed by: EMERGENCY MEDICINE

## 2019-02-27 PROCEDURE — 85025 COMPLETE CBC W/AUTO DIFF WBC: CPT | Performed by: EMERGENCY MEDICINE

## 2019-02-27 PROCEDURE — 80053 COMPREHEN METABOLIC PANEL: CPT | Performed by: EMERGENCY MEDICINE

## 2019-02-27 PROCEDURE — 99284 EMERGENCY DEPT VISIT MOD MDM: CPT | Mod: Z6 | Performed by: EMERGENCY MEDICINE

## 2019-02-27 PROCEDURE — 25000128 H RX IP 250 OP 636: Performed by: EMERGENCY MEDICINE

## 2019-02-27 PROCEDURE — 74176 CT ABD & PELVIS W/O CONTRAST: CPT

## 2019-02-27 PROCEDURE — 96360 HYDRATION IV INFUSION INIT: CPT | Performed by: EMERGENCY MEDICINE

## 2019-02-27 PROCEDURE — 99284 EMERGENCY DEPT VISIT MOD MDM: CPT | Mod: 25 | Performed by: EMERGENCY MEDICINE

## 2019-02-27 RX ORDER — SODIUM CHLORIDE 9 MG/ML
1000 INJECTION, SOLUTION INTRAVENOUS CONTINUOUS
Status: DISCONTINUED | OUTPATIENT
Start: 2019-02-27 | End: 2019-02-27 | Stop reason: HOSPADM

## 2019-02-27 RX ORDER — FLUTICASONE PROPIONATE 50 MCG
1 SPRAY, SUSPENSION (ML) NASAL DAILY PRN
COMMUNITY

## 2019-02-27 RX ADMIN — SODIUM CHLORIDE 1000 ML: 9 INJECTION, SOLUTION INTRAVENOUS at 10:54

## 2019-02-27 ASSESSMENT — ENCOUNTER SYMPTOMS
NAUSEA: 1
CHILLS: 0
TREMORS: 0
LIGHT-HEADEDNESS: 1
FEVER: 0
ABDOMINAL PAIN: 1
BLOOD IN STOOL: 1
VOMITING: 1
DIARRHEA: 1

## 2019-02-27 NOTE — ED PROVIDER NOTES
History     Chief Complaint   Patient presents with     Rectal Bleeding     HPI  Aditi Palacios is a 78 year old female with stage 3 chronic kidney failure and atrial fibrillation with pacemaker placement and coumadin therapy who presents to the emergency department via EMS for evaluation of rectal bleeding.  Patient has significant history with IBS including instances of constipation and diarrhea.  Last night around 1930, her symptoms of nausea, vomiting and diarrhea began.  She was noted to have a total of 7-9 episodes, per her . She noted melena, which occurred with the last few episodes of diarrhea.  She has pain in the lower abdomen when passing bowel movements.  The patient has mild light-headedness upon standing but denies fever, chills, or tremors.  She has no recent ill contacts as her  is reporting to be healthy without similar symptoms. No recent travel out of the [place].  No recent antibiotic use.  She denies chest pain or discomfort.    She reports history of colonoscopy with no significant findings, her  provides a list of previous procedures and it lists ischemic colitis without the need of bowel removal. The patient believes she had hemorrhoids during pregnancy although denies any recently. Patient has no history of ulcers.  She has had a total hysterectomy, but denies appendectomy or cholecystectomy. She denies family history of GI bleeds, Crohn's, diverticulitis, or colitis.    Allergies:  Allergies   Allergen Reactions     Amitriptyline      Atenolol      Codeine      Coreg [Carvedilol]      CR only     Crestor [Rosuvastatin]      Doxycycline      Duloxetine      Fentanyl      Gabapentin      Kenalog [Triamcinolone]      Lotrel      Lyrica [Pregabalin]      Niaspan [Niacin]      No Clinical Screening - See Comments      Fentanyl/Versed combination     Other [Seasonal Allergies]      Beta blockers except for carvedilol     Percocet [Oxycodone-Acetaminophen]      Percodan  [Oxycodone-Aspirin]      Premarin      Tape [Adhesive Tape]      Tramadol      Vioxx      Zocor [Simvastatin]        Problem List:    Patient Active Problem List    Diagnosis Date Noted     Breast mass, right 02/01/2017     Priority: Medium     Acute kidney injury (H) 01/01/2015     Priority: Medium     GI bleed 01/01/2015     Priority: Medium     Myalgia 07/14/2008     Priority: Medium        Past Medical History:    Past Medical History:   Diagnosis Date     Anxiety      Atrial fibrillation (H)      Cancer (H)      Fibromyalgia      Gastro-oesophageal reflux disease      Glaucoma      Hypertension      Hypothyroidism      Scoliosis      Vascular complications of mesenteric artery        Past Surgical History:    Past Surgical History:   Procedure Laterality Date     BACK SURGERY      c2-5 and 5-6 fusion     Billateral common illiac artery stent       BREAST SURGERY       EXTRACAPSULAR CATARACT EXTRATION WITH INTRAOCULAR LENS IMPLANT       GYN SURGERY      total hysterectomy     IMPLANT PACEMAKER      medtronic     ORTHOPEDIC SURGERY      L ankle     THORACOTOMY  12/18/01    L upper lobe       Family History:    Family History   Problem Relation Age of Onset     Unknown/Adopted Mother      Unknown/Adopted Father        Social History:  Marital Status:   [2]  Social History     Tobacco Use     Smoking status: Never Smoker   Substance Use Topics     Alcohol use: Not on file     Drug use: Not on file        Medications:      ALPRAZolam (XANAX) 0.5 MG tablet   aspirin 81 MG chewable tablet   brinzolamide-brimonidine (SIMBRINZA) 1-0.2 % ophthalmic suspension   CARVEDILOL 12.5 MG OR TABS   Cholecalciferol (VITAMIN D3 PO)   diltiazem (TIAZAC) 240 MG CP24   Flecainide Acetate (TAMBOCOR PO)   fluticasone (FLONASE) 50 MCG/ACT nasal spray   FUROSEMIDE PO   HYDROcodone-acetaminophen (NORCO) 5-325 MG per tablet   hypromellose (GENTEAL) ophthalmic gel 0.3%   LEVOTHYROXINE SODIUM PO   lidocaine (LIDODERM) 5 % patch    LISINOPRIL PO   MELATONIN PO   omeprazole 20 MG tablet   polyethylene glycol (MIRALAX/GLYCOLAX) powder   polyethylene glycol 0.4%- propylene glycol 0.3% (SYSTANE ULTRA) 0.4-0.3 % SOLN ophthalmic solution   PRAVASTATIN SODIUM PO   psyllium (METAMUCIL) 58.6 % POWD   saccharomyces boulardii (FLORASTOR) 250 MG capsule   SPIRONOLACTONE PO   WARFARIN SODIUM PO   XALATAN 0.005 % OP SOLN         Review of Systems   Constitutional: Negative for chills and fever.   Gastrointestinal: Positive for abdominal pain, blood in stool, diarrhea, nausea and vomiting.   Neurological: Positive for light-headedness. Negative for tremors.       Physical Exam   BP: 171/42  Temp: 98.6  F (37  C)  Resp: 20  SpO2: 96 %      Physical Exam general alert cooperative female in mild to moderate distress.  HEENT shows no scleral icterus.  She does have facial pallor.  Neck is supple.  Lungs were clear without adventitious sounds.  Cardiac auscultation was normal.  No CVA tenderness.  Abdomen revealed active bowel sounds on palpation she had mild left lower quadrant tenderness with out guarding or rebound.  There is no skin rash over the flank or abdomen.  No radicular leg pain.  Rectal exam reveals no external hemorrhoids.  No fissures or active bleeding.  No lesion to the examining digit.    ED Course        Procedures               Critical Care time:  none               Results for orders placed or performed during the hospital encounter of 02/27/19 (from the past 24 hour(s))   CBC with platelets differential   Result Value Ref Range    WBC 7.7 4.0 - 11.0 10e9/L    RBC Count 3.66 (L) 3.8 - 5.2 10e12/L    Hemoglobin 11.7 11.7 - 15.7 g/dL    Hematocrit 35.9 35.0 - 47.0 %    MCV 98 78 - 100 fl    MCH 32.0 26.5 - 33.0 pg    MCHC 32.6 31.5 - 36.5 g/dL    RDW 12.6 10.0 - 15.0 %    Platelet Count 252 150 - 450 10e9/L    Diff Method Automated Method     % Neutrophils 76.5 %    % Lymphocytes 14.8 %    % Monocytes 7.2 %    % Eosinophils 0.7 %    %  Basophils 0.4 %    % Immature Granulocytes 0.4 %    Nucleated RBCs 0 0 /100    Absolute Neutrophil 5.9 1.6 - 8.3 10e9/L    Absolute Lymphocytes 1.1 0.8 - 5.3 10e9/L    Absolute Monocytes 0.6 0.0 - 1.3 10e9/L    Absolute Eosinophils 0.1 0.0 - 0.7 10e9/L    Absolute Basophils 0.0 0.0 - 0.2 10e9/L    Abs Immature Granulocytes 0.0 0 - 0.4 10e9/L    Absolute Nucleated RBC 0.0    Comprehensive metabolic panel   Result Value Ref Range    Sodium 133 133 - 144 mmol/L    Potassium 5.1 3.4 - 5.3 mmol/L    Chloride 106 94 - 109 mmol/L    Carbon Dioxide 19 (L) 20 - 32 mmol/L    Anion Gap 8 3 - 14 mmol/L    Glucose 104 (H) 70 - 99 mg/dL    Urea Nitrogen 40 (H) 7 - 30 mg/dL    Creatinine 1.55 (H) 0.52 - 1.04 mg/dL    GFR Estimate 32 (L) >60 mL/min/[1.73_m2]    GFR Estimate If Black 37 (L) >60 mL/min/[1.73_m2]    Calcium 8.9 8.5 - 10.1 mg/dL    Bilirubin Total 0.3 0.2 - 1.3 mg/dL    Albumin 3.9 3.4 - 5.0 g/dL    Protein Total 7.2 6.8 - 8.8 g/dL    Alkaline Phosphatase 102 40 - 150 U/L    ALT 14 0 - 50 U/L    AST 11 0 - 45 U/L   INR   Result Value Ref Range    INR 2.40 (H) 0.86 - 1.14   CT Abdomen Pelvis w/o Contrast    Narrative    CT ABDOMEN/PELVIS WITHOUT CONTRAST February 27, 2019 12:53 PM     HISTORY: Abdomen pain, diverticulitis suspected. Noncontrast study as  patient has renal insufficiency and defers contrast.    TECHNIQUE: No IV contrast material. Radiation dose for this scan was  reduced using automated exposure control, adjustment of the mA and/or  kV according to patient size, or iterative reconstruction technique.    COMPARISON: 1/1/2015.    FINDINGS: Cardiac pacer noted. Within the limits of an unenhanced  study, the liver, gallbladder, spleen, pancreas, and adrenal glands  are unremarkable. There is a right renal cyst. No hydronephrosis or  new renal mass demonstrated. No abdominal or retroperitoneal  lymphadenopathy. No abnormal bowel distention, free air, or ascites.  The appendix is normal. No evidence of  diverticulitis. No pelvic  adenopathy, free fluid, or mass. There are bilateral common iliac  artery stents.      Impression    IMPRESSION: No acute abnormality demonstrated in the abdomen or  pelvis.    HAO HOWELL MD       Medications   0.9% sodium chloride BOLUS (0 mLs Intravenous Stopped 2/27/19 0519)     Followed by   sodium chloride 0.9% infusion (not administered)       Assessments & Plan (with Medical Decision Making)   Aditi Palacios is a 78 year old female with stage 3 chronic kidney failure and atrial fibrillation with pacemaker placement and coumadin therapy who presents to the emergency department via EMS for evaluation of rectal bleeding.  Patient has significant history with IBS including instances of constipation and diarrhea.  Last night around 1930, her symptoms of nausea, vomiting and diarrhea began.  She was noted to have a total of 7-9 episodes, per her . She noted melena, which occurred with the last few episodes of diarrhea.  She has pain in the lower abdomen when passing bowel movements.  The patient has mild light-headedness upon standing but denies fever, chills, or tremors.  She has no recent ill contacts as her  is reporting to be healthy without similar symptoms. No recent travel out of the [place].  No recent antibiotic use.  She denies chest pain or discomfort.    She reports history of colonoscopy with no significant findings, her  provides a list of previous procedures and it lists ischemic colitis without the need of bowel removal. The patient believes she had hemorrhoids during pregnancy although denies any recently. Patient has no history of ulcers.  She has had a total hysterectomy, but denies appendectomy or cholecystectomy. She denies family history of GI bleeds, Crohn's, diverticulitis, or colitis.  On presentation patient was afebrile and vitally stable.  She had no CVA tenderness.  She had left lower quadrant tenderness without guarding or rebound.   Rectal exam revealed no external hemorrhoids or active bleeding.  No fissures were noted.  No masses to the examining digit.  His blood work shows her hemoglobin is actually up a gram from a few weeks ago.  Renal function is stable with a creatinine of 1.55.  INR was 2.4 in the therapeutic range.  CT of the abdomen did not show cause for her bleeding.  She does have diverticular disease but no evidence of diverticulitis but this was a noncontrast study.  If she has recurrent significant bleeding consider diverticular bleed.  At this point she is stable vitally and her hemoglobin is increased from 2 weeks ago.  Doubt significant bleeding.  Information on GI bleeding is provided.  Office number for Dr. Desouza is provided for follow-up for colonoscopy or sigmoidoscopy.  Reasons to return to the emergency were discussed in both the patient and her  are in agreement with the plan.  I have reviewed the nursing notes.    I have reviewed the findings, diagnosis, plan and need for follow up with the patient.          Medication List      There are no discharge medications for this visit.         Final diagnoses:   Lower GI bleed     This document serves as a record of the services and decisions personally performed and made by Jack Fournier MD. It was created on HIS/HER behalf by Zahira Schrader, a trained medical scribe. The creation of this document is based the provider's statements to the medical scribe.  Zahira Schrader 11:30 AM 2/27/2019    Provider:   The information in this document, created by the medical scribe for me, accurately reflects the services I personally performed and the decisions made by me. I have reviewed and approved this document for accuracy prior to leaving the patient care area.  Jack Fournier MD 11:30 AM 2/27/2019 2/27/2019   [facility] EMERGENCY DEPARTMENT     Jack Fournier MD  02/27/19 8512

## 2019-02-27 NOTE — TELEPHONE ENCOUNTER
"77 y/o female, had eaten strawberries over the last couple days, felt nauseous, threw up and diarrhea - came out red like the strawberries, must have been \"bad strawberries\", vomit x1 diarrhea x4, underlying 3rd stage kidney disease, degenent disc disease. No fever and no strawberry allergies    RN Action/Disposition:  Reviewed protocols with  and wife, reviewed what blood looks like in stool, advised Home Care for now, at Mild Diarrhea and all triage questions negative. Likely mild food poisoning, biggest risk is dehydration, sheis able to keep fluids down now, continue, reviewed critical meds she should take if she is able to keep water down, also advised a dose of imodium if stool continues to luis out of her. Good hand washing and RN advised to call back with any changes, worsening of symptoms, and questions or concerns.     Devika Thompson RN - [place] Nurse Advisor  02/27/2019  1:55AM    Additional Information    Negative: Shock suspected (e.g., cold/pale/clammy skin, too weak to stand, low BP, rapid pulse)    Negative: Difficult to awaken or acting confused  (e.g., disoriented, slurred speech)    Negative: Sounds like a life-threatening emergency to the triager    Negative: [1] SEVERE abdominal pain (e.g., excruciating) AND [2] present > 1 hour    Negative: [1] SEVERE abdominal pain AND [2] age > 60    Negative: [1] Blood in the stool AND [2] moderate or large amount of blood    Negative: Black or tarry bowel movements  (Exception: chronic-unchanged  black-grey bowel movements AND is taking iron pills or Pepto-bismol)    Negative: [1] Drinking very little AND [2] dehydration suspected (e.g., no urine > 12 hours, very dry mouth, very lightheaded)    Negative: Patient sounds very sick or weak to the triager    Negative: [1] SEVERE diarrhea (e.g., 7 or more times / day more than normal) AND [2]  age > 60 years    Negative: [1] Constant abdominal pain AND [2] present > 2 hours    Negative: [1] Fever > 103 F " (39.4 C) AND [2] not able to get the fever down using Fever Care Advice    Negative: [1] Mucus or pus in stool AND [2] present > 2 days AND [3] diarrhea is more than mild    Negative: [1] Blood in the stool AND [2] small amount of blood   (Exception: only on toilet paper. Reason: diarrhea can cause rectal irritation with blood on wiping)    Negative: Abdominal pain  (Exception: Pain clears with each passage of diarrhea stool)    Negative: Fever present > 3 days (72 hours)    Negative: Fever > 101 F (38.3 C)    Negative: [1] MODERATE diarrhea (e.g., 4-6 times / day more than normal) AND [2] age > 70 years    Negative: [1] MODERATE diarrhea (e.g., 4-6 times / day more than normal) AND [2] present > 48 hours (2 days)    Negative: [1] SEVERE diarrhea (e.g., 7 or more times / day more than normal) AND [2] present > 24 hours (1 day)    Negative: [1] Recent antibiotic therapy (i.e., within last 2 months) AND [2] > 3 days since antibiotic was stopped    Negative: Weak immune system (e.g., HIV positive, cancer chemo, splenectomy, organ transplant, chronic steroids)    Negative: Tube feedings (e.g., nasogastric, g-tube, j-tube)    Negative: Travel to a foreign country in past month    Negative: [1] MILD (e.g., 1-3 or more stools than normal in past 24 hours) diarrhea without known cause AND [2] present >  7 days    Negative: Diarrhea is a chronic symptom (recurrent or ongoing AND present > 4 weeks)    MILD-MODERATE diarrhea (e.g., 1-6 times / day more than normal) (all triage questions negative)    Negative: SEVERE diarrhea (e.g., 7 or more times / day more than normal) (all triage questions negative)    Protocols used: DIARRHEA-ADULT-

## 2019-02-27 NOTE — ED AVS SNAPSHOT
[facility]  [address] [address]  Phone:  [phone]  Fax:  [phone]                                    Aditi Palacios   MRN: [id number]    Department:  [facility]   Date of Visit:  2/27/2019           After Visit Summary Signature Page    I have received my discharge instructions, and my questions have been answered. I have discussed any challenges I see with this plan with the nurse or doctor.    ..........................................................................................................................................  Patient/Patient Representative Signature      ..........................................................................................................................................  Patient Representative Print Name and Relationship to Patient    ..................................................               ................................................  Date                                   Time    ..........................................................................................................................................  Reviewed by Signature/Title    ...................................................              ..............................................  Date                                               Time          22EPIC Rev 08/18

## 2019-03-01 ENCOUNTER — TELEPHONE (OUTPATIENT)
Dept: SURGERY | Facility: CLINIC | Age: 79
End: 2019-03-01

## 2019-03-01 NOTE — TELEPHONE ENCOUNTER
Spoke with patient did let her know that no Order was done and could see Dr Desouza at the appt or could see PCP to both = get refer and order, as well as ensure coumadin is managed regarding getting a procedure.    Leta Cordova RN  [facility]

## 2019-03-01 NOTE — TELEPHONE ENCOUNTER
Reason for Call:  Other     Detailed comments: Pt was seen in the ER on 02/27 and diagnosed with a GI bleed and recommended to call a surgeon. Appt made for 03/06. ER provider told her she would need a colonoscopy - pt would like to speak with a nurse. - Please advise    Phone Number Patient can be reached at: Home number on file [phone] (home)    Best Time: Any    Can we leave a detailed message on this number? YES    Call taken on 3/1/2019 at 1:41 PM by Denise Behrendt

## 2019-03-05 ENCOUNTER — TELEPHONE (OUTPATIENT)
Dept: SURGERY | Facility: CLINIC | Age: 79
End: 2019-03-05

## 2019-03-05 ENCOUNTER — HOSPITAL ENCOUNTER (OUTPATIENT)
Facility: CLINIC | Age: 79
End: 2019-03-05
Attending: SURGERY | Admitting: SURGERY
Payer: MEDICARE

## 2019-03-05 DIAGNOSIS — K62.5 GASTROINTESTINAL HEMORRHAGE ASSOCIATED WITH ANORECTAL SOURCE: Primary | ICD-10-CM

## 2019-03-05 NOTE — TELEPHONE ENCOUNTER
Betzy from [facility] called in regards to this pt.  She stated the pt is to have a colonoscopy ASAP and that they sent the referral.  The pt was scheduled for a consult in clinic with Dr Desouza instead.  I spoke with Dr Desouza who placed the colonscopy order and ordered for the pt to stop her warfarin 5 days prior to procedure.  I asked Ciara, procedure  to assist with scheduling and instructions for home prep.  The pt is scheduled for Thursday 03/14, home prep instructions reviewed and mailed, and pt notified to hold warfarin.  She had no further questions at this time.      Lian HAMILTON, CMA

## 2019-12-02 ENCOUNTER — TELEPHONE (OUTPATIENT)
Dept: UROLOGY | Facility: CLINIC | Age: 79
End: 2019-12-02

## 2019-12-02 NOTE — TELEPHONE ENCOUNTER
Pt current in OR prep.  Advised if unable to urinate after surgery /anethesia most centers will not send home.  Suggested if they put in a smith, then leave it in and we can get her in within the month for follow up.  (Has needed urethral dilation in the past)    Recommended if a smith is placed, do not be in a hurry to remove or else may be in the same predicament in 24 hours.    If unable to urinate at all, then needs ER to intervene. Otherwise can schedule us here or see previous urologist (had sooner appt than our office)    Leta CHANDLER   [facility] RN

## 2019-12-02 NOTE — TELEPHONE ENCOUNTER
"Reason for call:  Patient reporting a symptom    Symptom or request: Pt is having surgery on her eye today - next day has f/up post op.  Looking to get in with urologist - was given medication for infection by pcp and was told if this doesn't work to get in with urologist.  Hasn't been able to urinate for minimum of 12 days - just a trickle - going some but \"that's not healthy\"  No other symptoms.    Duration (how long have symptoms been present): 12 + days    Have you been treated for this before? Yes by FP - but didn't help    Additional comments: Microbid 10 days did not help    Phone Number patient can be reached at:  Cell number on file:    Telephone Information:   Mobile [phone]       Best Time:      Can we leave a detailed message on this number:  YES please states ok to give information to  Valerio as well as she may be in eye surgery.    Call taken on 12/2/2019 at 10:23 AM by Gale Valadez    "

## 2020-04-14 ENCOUNTER — HOSPITAL ENCOUNTER (EMERGENCY)
Facility: CLINIC | Age: 80
Discharge: HOME | End: 2020-04-14
Attending: EMERGENCY MEDICINE | Admitting: EMERGENCY MEDICINE
Payer: MEDICARE

## 2020-04-14 VITALS
WEIGHT: 162 LBS | TEMPERATURE: 98.2 F | SYSTOLIC BLOOD PRESSURE: 165 MMHG | BODY MASS INDEX: 27.81 KG/M2 | OXYGEN SATURATION: 96 % | DIASTOLIC BLOOD PRESSURE: 56 MMHG | HEART RATE: 60 BPM | RESPIRATION RATE: 11 BRPM

## 2020-04-14 DIAGNOSIS — N18.30 STAGE 3 CHRONIC KIDNEY DISEASE (H): ICD-10-CM

## 2020-04-14 DIAGNOSIS — R53.83 FATIGUE, UNSPECIFIED TYPE: ICD-10-CM

## 2020-04-14 DIAGNOSIS — E86.0 DEHYDRATION: ICD-10-CM

## 2020-04-14 LAB
ALBUMIN SERPL-MCNC: 3.6 G/DL (ref 3.4–5)
ALP SERPL-CCNC: 75 U/L (ref 40–150)
ALT SERPL W P-5'-P-CCNC: 17 U/L (ref 0–50)
ANION GAP SERPL CALCULATED.3IONS-SCNC: 5 MMOL/L (ref 3–14)
AST SERPL W P-5'-P-CCNC: 9 U/L (ref 0–45)
BASOPHILS # BLD AUTO: 0.1 10E9/L (ref 0–0.2)
BASOPHILS NFR BLD AUTO: 0.9 %
BILIRUB SERPL-MCNC: 0.2 MG/DL (ref 0.2–1.3)
BUN SERPL-MCNC: 34 MG/DL (ref 7–30)
CALCIUM SERPL-MCNC: 9 MG/DL (ref 8.5–10.1)
CHLORIDE SERPL-SCNC: 104 MMOL/L (ref 94–109)
CO2 SERPL-SCNC: 25 MMOL/L (ref 20–32)
CREAT SERPL-MCNC: 1.34 MG/DL (ref 0.52–1.04)
DIFFERENTIAL METHOD BLD: ABNORMAL
EOSINOPHIL # BLD AUTO: 0.2 10E9/L (ref 0–0.7)
EOSINOPHIL NFR BLD AUTO: 2.6 %
ERYTHROCYTE [DISTWIDTH] IN BLOOD BY AUTOMATED COUNT: 12.1 % (ref 10–15)
GFR SERPL CREATININE-BSD FRML MDRD: 37 ML/MIN/{1.73_M2}
GLUCOSE SERPL-MCNC: 135 MG/DL (ref 70–99)
HCT VFR BLD AUTO: 32.2 % (ref 35–47)
HGB BLD-MCNC: 10.8 G/DL (ref 11.7–15.7)
IMM GRANULOCYTES # BLD: 0 10E9/L (ref 0–0.4)
IMM GRANULOCYTES NFR BLD: 0.5 %
INR PPP: 2.41 (ref 0.86–1.14)
LYMPHOCYTES # BLD AUTO: 1.5 10E9/L (ref 0.8–5.3)
LYMPHOCYTES NFR BLD AUTO: 22.6 %
MCH RBC QN AUTO: 32.8 PG (ref 26.5–33)
MCHC RBC AUTO-ENTMCNC: 33.5 G/DL (ref 31.5–36.5)
MCV RBC AUTO: 98 FL (ref 78–100)
MONOCYTES # BLD AUTO: 0.5 10E9/L (ref 0–1.3)
MONOCYTES NFR BLD AUTO: 8.1 %
NEUTROPHILS # BLD AUTO: 4.2 10E9/L (ref 1.6–8.3)
NEUTROPHILS NFR BLD AUTO: 65.3 %
NRBC # BLD AUTO: 0 10*3/UL
NRBC BLD AUTO-RTO: 0 /100
PLATELET # BLD AUTO: 245 10E9/L (ref 150–450)
POTASSIUM SERPL-SCNC: 4.7 MMOL/L (ref 3.4–5.3)
PROT SERPL-MCNC: 7 G/DL (ref 6.8–8.8)
RBC # BLD AUTO: 3.29 10E12/L (ref 3.8–5.2)
SODIUM SERPL-SCNC: 134 MMOL/L (ref 133–144)
TROPONIN I SERPL-MCNC: <0.015 UG/L (ref 0–0.04)
WBC # BLD AUTO: 6.4 10E9/L (ref 4–11)

## 2020-04-14 PROCEDURE — 85610 PROTHROMBIN TIME: CPT | Performed by: EMERGENCY MEDICINE

## 2020-04-14 PROCEDURE — 93010 ELECTROCARDIOGRAM REPORT: CPT | Mod: Z6 | Performed by: EMERGENCY MEDICINE

## 2020-04-14 PROCEDURE — 99285 EMERGENCY DEPT VISIT HI MDM: CPT | Mod: 25 | Performed by: EMERGENCY MEDICINE

## 2020-04-14 PROCEDURE — 80053 COMPREHEN METABOLIC PANEL: CPT | Performed by: EMERGENCY MEDICINE

## 2020-04-14 PROCEDURE — 85025 COMPLETE CBC W/AUTO DIFF WBC: CPT | Performed by: EMERGENCY MEDICINE

## 2020-04-14 PROCEDURE — 96361 HYDRATE IV INFUSION ADD-ON: CPT | Performed by: EMERGENCY MEDICINE

## 2020-04-14 PROCEDURE — 96360 HYDRATION IV INFUSION INIT: CPT | Performed by: EMERGENCY MEDICINE

## 2020-04-14 PROCEDURE — 25800030 ZZH RX IP 258 OP 636: Performed by: EMERGENCY MEDICINE

## 2020-04-14 PROCEDURE — 84484 ASSAY OF TROPONIN QUANT: CPT | Performed by: EMERGENCY MEDICINE

## 2020-04-14 PROCEDURE — 99284 EMERGENCY DEPT VISIT MOD MDM: CPT | Mod: 25 | Performed by: EMERGENCY MEDICINE

## 2020-04-14 RX ORDER — SODIUM CHLORIDE 9 MG/ML
1000 INJECTION, SOLUTION INTRAVENOUS CONTINUOUS
Status: DISCONTINUED | OUTPATIENT
Start: 2020-04-14 | End: 2020-04-14 | Stop reason: HOSPADM

## 2020-04-14 RX ADMIN — SODIUM CHLORIDE 1000 ML: 9 INJECTION, SOLUTION INTRAVENOUS at 10:21

## 2020-04-14 NOTE — ED NOTES
Pt has been having fatigue for the past month.  Reports her symptoms started suddenly on March 15.  Talked to nephrology yesterday and told to be seen.  Hasn't had any chest pain.  Feeling dizzy with getting up.  Has felt like this in the past with dehydration.  SOA with activity.  Nausea off and on, poor appetite.  Slight ankle swelling.  Just feeling weak since March and not getting around like she usually does.   Pt having generalized weakness, moving all extremities equally.

## 2020-04-14 NOTE — ED PROVIDER NOTES
History     Chief Complaint   Patient presents with     Fatigue     feels she is dehydrated      HPI  Aditi Palacios is a 80 year old female with history of renal artery stenosis, right renal artery angioplasty ~ 2004 (fibromuscular dysplasia), moderate chronic kidney failure stage III, DM type II, atrial fibrillation (on Coumadin), hyperaldosteronism (on spironolactone), SIADH, who presents with 1 month of fatigue and malaise consistent with recurrent dehydration.  She presents emergency department feeling like she needs IV fluids.  She reports that her Nephrologist asked her to hold Spironolactone, and she held the first dose yesterday.  She is going to follow-up with her nephrologist tomorrow.  She reports that she had lab work performed at an outside facility yesterday, but does not know the results.  She denies any chest pain, palpitations, syncope, shortness of breath, vomiting, diarrhea or signs or symptoms of GI bleeding, or other acute complaints or concerns.    Allergies:  Allergies   Allergen Reactions     Amitriptyline      Atenolol      Codeine      Coreg [Carvedilol]      CR only     Crestor [Rosuvastatin]      Doxycycline      Duloxetine      Fentanyl      Gabapentin      Kenalog [Triamcinolone]      Lotrel      Lyrica [Pregabalin]      Niaspan [Niacin]      No Clinical Screening - See Comments      Fentanyl/Versed combination     Other [Seasonal Allergies]      Beta blockers except for carvedilol     Percocet [Oxycodone-Acetaminophen]      Percodan [Oxycodone-Aspirin]      Premarin      Tape [Adhesive Tape]      Tramadol      Vioxx      Zocor [Simvastatin]        Problem List:    Patient Active Problem List    Diagnosis Date Noted     Breast mass, right 02/01/2017     Priority: Medium     Acute kidney injury (H) 01/01/2015     Priority: Medium     GI bleed 01/01/2015     Priority: Medium     Myalgia 07/14/2008     Priority: Medium        Past Medical History:    Past Medical History:   Diagnosis  Date     Anxiety      Atrial fibrillation (H)      Cancer (H)      Fibromyalgia      Gastro-oesophageal reflux disease      Glaucoma      Hypertension      Hypothyroidism      Scoliosis      Vascular complications of mesenteric artery        Past Surgical History:    Past Surgical History:   Procedure Laterality Date     BACK SURGERY      c2-5 and 5-6 fusion     Billateral common illiac artery stent       BREAST SURGERY       EXTRACAPSULAR CATARACT EXTRATION WITH INTRAOCULAR LENS IMPLANT       GYN SURGERY      total hysterectomy     IMPLANT PACEMAKER      medtronic     ORTHOPEDIC SURGERY      L ankle     THORACOTOMY  12/18/01    L upper lobe       Family History:    Family History   Problem Relation Age of Onset     Unknown/Adopted Mother      Unknown/Adopted Father        Social History:  Marital Status:   [2]  Social History     Tobacco Use     Smoking status: Never Smoker   Substance Use Topics     Alcohol use: Not on file     Drug use: Not on file        Medications:    ALPRAZolam (XANAX) 0.5 MG tablet  aspirin 81 MG chewable tablet  brinzolamide-brimonidine (SIMBRINZA) 1-0.2 % ophthalmic suspension  CARVEDILOL 12.5 MG OR TABS  Cholecalciferol (VITAMIN D3 PO)  diltiazem (TIAZAC) 240 MG CP24  Flecainide Acetate (TAMBOCOR PO)  fluticasone (FLONASE) 50 MCG/ACT nasal spray  FUROSEMIDE PO  HYDROcodone-acetaminophen (NORCO) 5-325 MG per tablet  hypromellose (GENTEAL) ophthalmic gel 0.3%  LEVOTHYROXINE SODIUM PO  lidocaine (LIDODERM) 5 % patch  LISINOPRIL PO  MELATONIN PO  omeprazole 20 MG tablet  polyethylene glycol (MIRALAX/GLYCOLAX) powder  polyethylene glycol 0.4%- propylene glycol 0.3% (SYSTANE ULTRA) 0.4-0.3 % SOLN ophthalmic solution  PRAVASTATIN SODIUM PO  psyllium (METAMUCIL) 58.6 % POWD  saccharomyces boulardii (FLORASTOR) 250 MG capsule  SPIRONOLACTONE PO  WARFARIN SODIUM PO  XALATAN 0.005 % OP SOLN        Review of Systems  As mentioned above in the history present illness.  All other systems were  reviewed and are negative.    Physical Exam   BP: (!) 155/52  Pulse: 60  Heart Rate: 60  Temp: 98.2  F (36.8  C)  Resp: 16  Weight: 73.5 kg (162 lb)  SpO2: 97 %      Physical Exam  Vitals signs and nursing note reviewed.   Constitutional:       General: She is not in acute distress.     Appearance: Normal appearance. She is well-developed. She is not ill-appearing or diaphoretic.   HENT:      Head: Normocephalic and atraumatic.      Right Ear: External ear normal.      Left Ear: External ear normal.      Nose: Nose normal.      Mouth/Throat:      Mouth: Mucous membranes are dry.   Eyes:      General: No scleral icterus.     Extraocular Movements: Extraocular movements intact.      Conjunctiva/sclera: Conjunctivae normal.   Neck:      Musculoskeletal: Normal range of motion and neck supple.      Trachea: No tracheal deviation.   Cardiovascular:      Rate and Rhythm: Normal rate and regular rhythm.      Heart sounds: Normal heart sounds. No murmur. No friction rub. No gallop.    Pulmonary:      Effort: Pulmonary effort is normal. No respiratory distress.      Breath sounds: Normal breath sounds. No wheezing, rhonchi or rales.   Abdominal:      General: There is no distension.      Palpations: Abdomen is soft.      Tenderness: There is no abdominal tenderness.   Musculoskeletal: Normal range of motion.         General: No tenderness.      Right lower leg: No edema.      Left lower leg: No edema.   Skin:     General: Skin is warm and dry.      Coloration: Skin is not pale.      Findings: No erythema or rash.   Neurological:      General: No focal deficit present.      Mental Status: She is alert and oriented to person, place, and time.      Coordination: Coordination normal.   Psychiatric:         Mood and Affect: Mood normal.         Behavior: Behavior normal.         ED Course        Procedures               EKG Interpretation:      Interpreted by Benson Peterson MD  Time reviewed: Upon completion  Symptoms at time  of EKG: Generalized weakness  Rhythm: Atrial pacing with prolonged WV interval/first-degree AV block  Rate: normal, 60  Axis: normal  Ectopy: none  Conduction: Atrial pacing with first-degree AV block  ST Segments/ T Waves: No acute ST-T wave changes or abnormality  Q Waves: none  Comparison to prior: Unchanged from 2/26/2016  Clinical Impression: Atrial pacing, rate 60, unchanged from baseline         Results for orders placed or performed during the hospital encounter of 04/14/20 (from the past 24 hour(s))   CBC with platelets differential   Result Value Ref Range    WBC 6.4 4.0 - 11.0 10e9/L    RBC Count 3.29 (L) 3.8 - 5.2 10e12/L    Hemoglobin 10.8 (L) 11.7 - 15.7 g/dL    Hematocrit 32.2 (L) 35.0 - 47.0 %    MCV 98 78 - 100 fl    MCH 32.8 26.5 - 33.0 pg    MCHC 33.5 31.5 - 36.5 g/dL    RDW 12.1 10.0 - 15.0 %    Platelet Count 245 150 - 450 10e9/L    Diff Method Automated Method     % Neutrophils 65.3 %    % Lymphocytes 22.6 %    % Monocytes 8.1 %    % Eosinophils 2.6 %    % Basophils 0.9 %    % Immature Granulocytes 0.5 %    Nucleated RBCs 0 0 /100    Absolute Neutrophil 4.2 1.6 - 8.3 10e9/L    Absolute Lymphocytes 1.5 0.8 - 5.3 10e9/L    Absolute Monocytes 0.5 0.0 - 1.3 10e9/L    Absolute Eosinophils 0.2 0.0 - 0.7 10e9/L    Absolute Basophils 0.1 0.0 - 0.2 10e9/L    Abs Immature Granulocytes 0.0 0 - 0.4 10e9/L    Absolute Nucleated RBC 0.0    Comprehensive metabolic panel   Result Value Ref Range    Sodium 134 133 - 144 mmol/L    Potassium 4.7 3.4 - 5.3 mmol/L    Chloride 104 94 - 109 mmol/L    Carbon Dioxide 25 20 - 32 mmol/L    Anion Gap 5 3 - 14 mmol/L    Glucose 135 (H) 70 - 99 mg/dL    Urea Nitrogen 34 (H) 7 - 30 mg/dL    Creatinine 1.34 (H) 0.52 - 1.04 mg/dL    GFR Estimate 37 (L) >60 mL/min/[1.73_m2]    GFR Estimate If Black 43 (L) >60 mL/min/[1.73_m2]    Calcium 9.0 8.5 - 10.1 mg/dL    Bilirubin Total 0.2 0.2 - 1.3 mg/dL    Albumin 3.6 3.4 - 5.0 g/dL    Protein Total 7.0 6.8 - 8.8 g/dL    Alkaline  Phosphatase 75 40 - 150 U/L    ALT 17 0 - 50 U/L    AST 9 0 - 45 U/L   INR   Result Value Ref Range    INR 2.41 (H) 0.86 - 1.14   Troponin I   Result Value Ref Range    Troponin I ES <0.015 0.000 - 0.045 ug/L       Medications   sodium chloride 0.9% infusion (has no administration in time range)   0.9% sodium chloride BOLUS (1,000 mLs Intravenous New Bag 4/14/20 1021)     Previous Records Reviewed:  Review of EMR shows chronic stable anemia, baseline is approximately 10.4-11.2 with hemoglobin 10.6 yesterday.  CARDIAC ROUTINE ECHOCARDIOGRAM - 1/14/2019  [facility]  Result Narrative    Contrast:                    Contrast Allergy:      Clinical Indications:Nonrheumatic AV insufficiency         CONCLUSION:    Echo 2019-01-14 14:36.     Calculated 3D LVEF  55%.       Normal LV size and systolic function.     Normal RV size and function.     Moderate mitral regurgitation.     Mild to moderate aortic regurgitation.         12:20 PM - Approximately 750 mL infused, she is feeling better, has been up to the bathroom and feels much improved.  Urine appears normal.    12:37 PM - I reviewed the case with the patient's Nephrologist/Nephrology service, Dr. Ramires the [facility] in [facility] was parked.    Assessments & Plan (with Medical Decision Making)   80-year-old female with stage III chronic kidney disease, diabetes mellitus type 2 and hypoaldosteronism who presents with approximately 1 month of fatigue consistent with her prior symptoms of dehydration.  No other concerning history or clinical findings.  She had laboratory evaluation yesterday which showed BUN/creatinine of 35/1.43, and GFR 40.  Prior to this on 12/13/2019 creatinine was 1.53 with GFR 37.  Her nephrologist instructed her to try to hold Spironolactone and she has not taken this yesterday or today.  Today BUN is 34 and creatinine 1.34 with GFR 37.  She was given normal saline 1 L IV and felt significantly improved.  I consulted her Nephrology  service and was felt that 1 L IV normal saline fluid bolus was adequate and they will follow-up with her within the next 48 hours, with repeat lab work.  She was advised to return should she develop worsening symptoms, or for new problems or concerns.    I have reviewed the nursing notes.    I have reviewed the findings, diagnosis, plan and need for follow up with the patient.    New Prescriptions    No medications on file       Final diagnoses:   Fatigue, unspecified type   Dehydration   Stage 3 chronic kidney disease (H)       4/14/2020   [facility] EMERGENCY DEPARTMENT     Benson Peterson MD  04/14/20 2495

## 2020-04-14 NOTE — ED NOTES
Pt feeling much better after getting fluids.  Up frequently to the bathroom.  Denies any breathing difficulty or pain.

## 2020-04-14 NOTE — ED AVS SNAPSHOT
[facility] Emergency Department  [address] [address]  Phone:  [phone]  Fax:  [phone]                                    Aditi Palacios   MRN: [id number]    Department:  [facility] Emergency Department   Date of Visit:  4/14/2020           After Visit Summary Signature Page    I have received my discharge instructions, and my questions have been answered. I have discussed any challenges I see with this plan with the nurse or doctor.    ..........................................................................................................................................  Patient/Patient Representative Signature      ..........................................................................................................................................  Patient Representative Print Name and Relationship to Patient    ..................................................               ................................................  Date                                   Time    ..........................................................................................................................................  Reviewed by Signature/Title    ...................................................              ..............................................  Date                                               Time          22EPIC Rev 08/18

## 2021-05-26 ENCOUNTER — RECORDS - HEALTHEAST (OUTPATIENT)
Dept: ADMINISTRATIVE | Facility: CLINIC | Age: 81
End: 2021-05-26

## 2021-05-27 ENCOUNTER — RECORDS - HEALTHEAST (OUTPATIENT)
Dept: ADMINISTRATIVE | Facility: CLINIC | Age: 81
End: 2021-05-27

## 2021-05-28 ENCOUNTER — RECORDS - HEALTHEAST (OUTPATIENT)
Dept: ADMINISTRATIVE | Facility: CLINIC | Age: 81
End: 2021-05-28

## 2021-05-29 ENCOUNTER — RECORDS - HEALTHEAST (OUTPATIENT)
Dept: ADMINISTRATIVE | Facility: CLINIC | Age: 81
End: 2021-05-29

## 2021-05-30 ENCOUNTER — RECORDS - HEALTHEAST (OUTPATIENT)
Dept: ADMINISTRATIVE | Facility: CLINIC | Age: 81
End: 2021-05-30

## 2021-05-31 ENCOUNTER — RECORDS - HEALTHEAST (OUTPATIENT)
Dept: ADMINISTRATIVE | Facility: CLINIC | Age: 81
End: 2021-05-31

## 2021-06-01 ENCOUNTER — RECORDS - HEALTHEAST (OUTPATIENT)
Dept: ADMINISTRATIVE | Facility: CLINIC | Age: 81
End: 2021-06-01

## 2021-07-13 ENCOUNTER — RECORDS - HEALTHEAST (OUTPATIENT)
Dept: ADMINISTRATIVE | Facility: CLINIC | Age: 81
End: 2021-07-13

## 2022-01-10 ENCOUNTER — APPOINTMENT (OUTPATIENT)
Dept: CT IMAGING | Facility: CLINIC | Age: 82
DRG: 682 | End: 2022-01-10
Attending: FAMILY MEDICINE
Payer: MEDICARE

## 2022-01-10 ENCOUNTER — HOSPITAL ENCOUNTER (INPATIENT)
Facility: CLINIC | Age: 82
LOS: 3 days | Discharge: HOME HEALTH | DRG: 682 | End: 2022-01-15
Attending: FAMILY MEDICINE | Admitting: FAMILY MEDICINE
Payer: MEDICARE

## 2022-01-10 ENCOUNTER — APPOINTMENT (OUTPATIENT)
Dept: ULTRASOUND IMAGING | Facility: CLINIC | Age: 82
DRG: 682 | End: 2022-01-10
Attending: FAMILY MEDICINE
Payer: MEDICARE

## 2022-01-10 ENCOUNTER — NURSE TRIAGE (OUTPATIENT)
Dept: NURSING | Facility: CLINIC | Age: 82
End: 2022-01-10
Payer: MEDICARE

## 2022-01-10 ENCOUNTER — APPOINTMENT (OUTPATIENT)
Dept: GENERAL RADIOLOGY | Facility: CLINIC | Age: 82
DRG: 682 | End: 2022-01-10
Attending: FAMILY MEDICINE
Payer: MEDICARE

## 2022-01-10 DIAGNOSIS — R93.2 ABNORMAL ULTRASOUND OF GALLBLADDER: ICD-10-CM

## 2022-01-10 DIAGNOSIS — R79.89 HYPOURICEMIA: ICD-10-CM

## 2022-01-10 DIAGNOSIS — H60.391 INFECTIVE OTITIS EXTERNA, RIGHT: Primary | ICD-10-CM

## 2022-01-10 DIAGNOSIS — N17.9 ACUTE KIDNEY INJURY (H): ICD-10-CM

## 2022-01-10 DIAGNOSIS — R79.89 PRERENAL AZOTEMIA: ICD-10-CM

## 2022-01-10 DIAGNOSIS — R93.2 NONVISUALIZATION OF GALLBLADDER: ICD-10-CM

## 2022-01-10 DIAGNOSIS — Z11.52 ENCOUNTER FOR SCREENING LABORATORY TESTING FOR SEVERE ACUTE RESPIRATORY SYNDROME CORONAVIRUS 2 (SARS-COV-2): ICD-10-CM

## 2022-01-10 DIAGNOSIS — J96.01 ACUTE RESPIRATORY FAILURE WITH HYPOXIA (H): ICD-10-CM

## 2022-01-10 LAB
ALBUMIN SERPL-MCNC: 3 G/DL (ref 3.4–5)
ALBUMIN UR-MCNC: NEGATIVE MG/DL
ALP SERPL-CCNC: 69 U/L (ref 40–150)
ALT SERPL W P-5'-P-CCNC: 18 U/L (ref 0–50)
ANION GAP SERPL CALCULATED.3IONS-SCNC: 9 MMOL/L (ref 3–14)
APPEARANCE UR: ABNORMAL
AST SERPL W P-5'-P-CCNC: 11 U/L (ref 0–45)
BACTERIA #/AREA URNS HPF: ABNORMAL /HPF
BILIRUB SERPL-MCNC: 0.3 MG/DL (ref 0.2–1.3)
BILIRUB UR QL STRIP: NEGATIVE
BUN SERPL-MCNC: 61 MG/DL (ref 7–30)
CALCIUM SERPL-MCNC: 8.8 MG/DL (ref 8.5–10.1)
CHLORIDE BLD-SCNC: 99 MMOL/L (ref 94–109)
CO2 SERPL-SCNC: 21 MMOL/L (ref 20–32)
COLOR UR AUTO: YELLOW
CREAT SERPL-MCNC: 3.5 MG/DL (ref 0.52–1.04)
CREAT UR-MCNC: 79 MG/DL
FRACT EXCRET NA UR+SERPL-RTO: 1.8 %
GFR SERPL CREATININE-BSD FRML MDRD: 13 ML/MIN/1.73M2
GLUCOSE BLD-MCNC: 116 MG/DL (ref 70–99)
GLUCOSE UR STRIP-MCNC: NEGATIVE MG/DL
HGB UR QL STRIP: NEGATIVE
HOLD SPECIMEN: NORMAL
HOLD SPECIMEN: NORMAL
HYALINE CASTS: 10 /LPF
INR PPP: 1.77 (ref 0.85–1.15)
KETONES UR STRIP-MCNC: NEGATIVE MG/DL
LEUKOCYTE ESTERASE UR QL STRIP: ABNORMAL
LIPASE SERPL-CCNC: 85 U/L (ref 73–393)
NITRATE UR QL: NEGATIVE
PH UR STRIP: 5 [PH] (ref 5–7)
POTASSIUM BLD-SCNC: 5.6 MMOL/L (ref 3.4–5.3)
PROT SERPL-MCNC: 6.4 G/DL (ref 6.8–8.8)
RBC URINE: 6 /HPF
SARS-COV-2 RNA RESP QL NAA+PROBE: NEGATIVE
SODIUM SERPL-SCNC: 129 MMOL/L (ref 133–144)
SODIUM UR-SCNC: 51 MMOL/L
SP GR UR STRIP: 1.01 (ref 1–1.03)
SQUAMOUS EPITHELIAL: 5 /HPF
TROPONIN I SERPL HS-MCNC: 10 NG/L
TSH SERPL DL<=0.005 MIU/L-ACNC: 1.88 MU/L (ref 0.4–4)
UROBILINOGEN UR STRIP-MCNC: NORMAL MG/DL
WBC URINE: 9 /HPF

## 2022-01-10 PROCEDURE — 81001 URINALYSIS AUTO W/SCOPE: CPT | Performed by: FAMILY MEDICINE

## 2022-01-10 PROCEDURE — 76775 US EXAM ABDO BACK WALL LIM: CPT | Performed by: FAMILY MEDICINE

## 2022-01-10 PROCEDURE — 84443 ASSAY THYROID STIM HORMONE: CPT | Performed by: FAMILY MEDICINE

## 2022-01-10 PROCEDURE — 258N000003 HC RX IP 258 OP 636: Performed by: FAMILY MEDICINE

## 2022-01-10 PROCEDURE — 99285 EMERGENCY DEPT VISIT HI MDM: CPT | Mod: 25 | Performed by: FAMILY MEDICINE

## 2022-01-10 PROCEDURE — 87635 SARS-COV-2 COVID-19 AMP PRB: CPT | Performed by: FAMILY MEDICINE

## 2022-01-10 PROCEDURE — 71045 X-RAY EXAM CHEST 1 VIEW: CPT

## 2022-01-10 PROCEDURE — 93308 TTE F-UP OR LMTD: CPT | Performed by: FAMILY MEDICINE

## 2022-01-10 PROCEDURE — 83690 ASSAY OF LIPASE: CPT | Performed by: FAMILY MEDICINE

## 2022-01-10 PROCEDURE — 80053 COMPREHEN METABOLIC PANEL: CPT | Performed by: FAMILY MEDICINE

## 2022-01-10 PROCEDURE — 85610 PROTHROMBIN TIME: CPT | Performed by: FAMILY MEDICINE

## 2022-01-10 PROCEDURE — 84484 ASSAY OF TROPONIN QUANT: CPT | Performed by: FAMILY MEDICINE

## 2022-01-10 PROCEDURE — 87086 URINE CULTURE/COLONY COUNT: CPT | Performed by: FAMILY MEDICINE

## 2022-01-10 PROCEDURE — 76705 ECHO EXAM OF ABDOMEN: CPT

## 2022-01-10 PROCEDURE — 250N000013 HC RX MED GY IP 250 OP 250 PS 637: Performed by: FAMILY MEDICINE

## 2022-01-10 PROCEDURE — 36415 COLL VENOUS BLD VENIPUNCTURE: CPT | Performed by: FAMILY MEDICINE

## 2022-01-10 PROCEDURE — 51798 US URINE CAPACITY MEASURE: CPT | Performed by: FAMILY MEDICINE

## 2022-01-10 PROCEDURE — G1004 CDSM NDSC: HCPCS

## 2022-01-10 PROCEDURE — 76604 US EXAM CHEST: CPT | Performed by: FAMILY MEDICINE

## 2022-01-10 PROCEDURE — 84300 ASSAY OF URINE SODIUM: CPT | Performed by: FAMILY MEDICINE

## 2022-01-10 PROCEDURE — 93010 ELECTROCARDIOGRAM REPORT: CPT | Performed by: FAMILY MEDICINE

## 2022-01-10 PROCEDURE — 93005 ELECTROCARDIOGRAM TRACING: CPT | Performed by: FAMILY MEDICINE

## 2022-01-10 PROCEDURE — 76604 US EXAM CHEST: CPT | Mod: 26 | Performed by: FAMILY MEDICINE

## 2022-01-10 PROCEDURE — 93308 TTE F-UP OR LMTD: CPT | Mod: 26 | Performed by: FAMILY MEDICINE

## 2022-01-10 PROCEDURE — 96361 HYDRATE IV INFUSION ADD-ON: CPT | Performed by: FAMILY MEDICINE

## 2022-01-10 PROCEDURE — 76775 US EXAM ABDO BACK WALL LIM: CPT | Mod: 26 | Performed by: FAMILY MEDICINE

## 2022-01-10 PROCEDURE — C9803 HOPD COVID-19 SPEC COLLECT: HCPCS | Performed by: FAMILY MEDICINE

## 2022-01-10 RX ORDER — LEVOTHYROXINE SODIUM 88 UG/1
88 TABLET ORAL
Status: DISCONTINUED | OUTPATIENT
Start: 2022-01-11 | End: 2022-01-12

## 2022-01-10 RX ORDER — WARFARIN SODIUM 2.5 MG/1
2.5 TABLET ORAL
Status: DISCONTINUED | OUTPATIENT
Start: 2022-01-10 | End: 2022-01-10

## 2022-01-10 RX ORDER — WARFARIN SODIUM 2.5 MG/1
2.5 TABLET ORAL
Status: COMPLETED | OUTPATIENT
Start: 2022-01-10 | End: 2022-01-10

## 2022-01-10 RX ORDER — HYDROCODONE BITARTRATE AND ACETAMINOPHEN 5; 325 MG/1; MG/1
1 TABLET ORAL EVERY 6 HOURS PRN
Status: DISCONTINUED | OUTPATIENT
Start: 2022-01-10 | End: 2022-01-15 | Stop reason: HOSPADM

## 2022-01-10 RX ORDER — CEPHALEXIN 500 MG/1
500 CAPSULE ORAL 2 TIMES DAILY
Status: DISCONTINUED | OUTPATIENT
Start: 2022-01-10 | End: 2022-01-11

## 2022-01-10 RX ORDER — SPIRONOLACTONE 25 MG/1
12.5 TABLET ORAL DAILY
COMMUNITY
Start: 2021-11-02

## 2022-01-10 RX ORDER — LEVOTHYROXINE SODIUM 88 UG/1
1 TABLET ORAL DAILY
COMMUNITY
Start: 2021-12-29

## 2022-01-10 RX ORDER — SODIUM CHLORIDE, SODIUM LACTATE, POTASSIUM CHLORIDE, CALCIUM CHLORIDE 600; 310; 30; 20 MG/100ML; MG/100ML; MG/100ML; MG/100ML
1000 INJECTION, SOLUTION INTRAVENOUS CONTINUOUS
Status: DISCONTINUED | OUTPATIENT
Start: 2022-01-10 | End: 2022-01-10

## 2022-01-10 RX ORDER — LISINOPRIL 5 MG/1
1 TABLET ORAL EVERY MORNING
COMMUNITY
Start: 2021-11-05

## 2022-01-10 RX ORDER — FUROSEMIDE 20 MG
1 TABLET ORAL DAILY
COMMUNITY
Start: 2021-10-29

## 2022-01-10 RX ORDER — DILTIAZEM HYDROCHLORIDE 240 MG/1
240 CAPSULE, COATED, EXTENDED RELEASE ORAL EVERY EVENING
Status: DISCONTINUED | OUTPATIENT
Start: 2022-01-10 | End: 2022-01-12

## 2022-01-10 RX ORDER — PANTOPRAZOLE SODIUM 40 MG/1
40 TABLET, DELAYED RELEASE ORAL DAILY
Status: DISCONTINUED | OUTPATIENT
Start: 2022-01-11 | End: 2022-01-15 | Stop reason: HOSPADM

## 2022-01-10 RX ORDER — DILTIAZEM HYDROCHLORIDE 240 MG/1
1 CAPSULE, COATED, EXTENDED RELEASE ORAL DAILY
COMMUNITY
Start: 2021-12-08

## 2022-01-10 RX ORDER — SODIUM CHLORIDE, SODIUM LACTATE, POTASSIUM CHLORIDE, CALCIUM CHLORIDE 600; 310; 30; 20 MG/100ML; MG/100ML; MG/100ML; MG/100ML
1000 INJECTION, SOLUTION INTRAVENOUS CONTINUOUS
Status: DISCONTINUED | OUTPATIENT
Start: 2022-01-10 | End: 2022-01-12

## 2022-01-10 RX ORDER — PRAVASTATIN SODIUM 20 MG
1 TABLET ORAL EVERY EVENING
COMMUNITY
Start: 2021-10-27

## 2022-01-10 RX ORDER — ALPRAZOLAM 0.5 MG/1
0.25 TABLET, EXTENDED RELEASE ORAL 2 TIMES DAILY PRN
Status: DISCONTINUED | OUTPATIENT
Start: 2022-01-10 | End: 2022-01-12

## 2022-01-10 RX ORDER — FLECAINIDE ACETATE 50 MG/1
50 TABLET ORAL 2 TIMES DAILY
COMMUNITY
Start: 2021-11-23

## 2022-01-10 RX ORDER — WARFARIN SODIUM 2.5 MG/1
TABLET ORAL
COMMUNITY
Start: 2021-12-31

## 2022-01-10 RX ORDER — HYDROXYZINE PAMOATE 25 MG/1
CAPSULE ORAL
COMMUNITY
Start: 2021-12-03

## 2022-01-10 RX ORDER — LISINOPRIL 30 MG/1
1 TABLET ORAL AT BEDTIME
COMMUNITY
Start: 2021-12-16

## 2022-01-10 RX ORDER — FLECAINIDE ACETATE 50 MG/1
50 TABLET ORAL 2 TIMES DAILY
Status: DISCONTINUED | OUTPATIENT
Start: 2022-01-10 | End: 2022-01-12

## 2022-01-10 RX ORDER — ALPRAZOLAM 0.5 MG
0.5 TABLET ORAL 2 TIMES DAILY PRN
Status: DISCONTINUED | OUTPATIENT
Start: 2022-01-10 | End: 2022-01-10 | Stop reason: DRUGHIGH

## 2022-01-10 RX ORDER — PRAVASTATIN SODIUM 20 MG
20 TABLET ORAL EVERY EVENING
Status: DISCONTINUED | OUTPATIENT
Start: 2022-01-10 | End: 2022-01-12

## 2022-01-10 RX ORDER — ALPRAZOLAM 0.25 MG
1 TABLET ORAL 2 TIMES DAILY
COMMUNITY
Start: 2021-12-20

## 2022-01-10 RX ADMIN — PRAVASTATIN SODIUM 20 MG: 20 TABLET ORAL at 18:13

## 2022-01-10 RX ADMIN — SODIUM CHLORIDE, POTASSIUM CHLORIDE, SODIUM LACTATE AND CALCIUM CHLORIDE 1000 ML: 600; 310; 30; 20 INJECTION, SOLUTION INTRAVENOUS at 11:09

## 2022-01-10 RX ADMIN — HYDROCODONE BITARTRATE AND ACETAMINOPHEN 1 TABLET: 5; 325 TABLET ORAL at 21:03

## 2022-01-10 RX ADMIN — SODIUM CHLORIDE, POTASSIUM CHLORIDE, SODIUM LACTATE AND CALCIUM CHLORIDE 1000 ML: 600; 310; 30; 20 INJECTION, SOLUTION INTRAVENOUS at 23:43

## 2022-01-10 RX ADMIN — ALPRAZOLAM 0.25 MG: 0.25 TABLET ORAL at 23:43

## 2022-01-10 RX ADMIN — FLECAINIDE ACETATE 50 MG: 50 TABLET ORAL at 18:13

## 2022-01-10 RX ADMIN — SODIUM CHLORIDE, POTASSIUM CHLORIDE, SODIUM LACTATE AND CALCIUM CHLORIDE 1000 ML: 600; 310; 30; 20 INJECTION, SOLUTION INTRAVENOUS at 16:22

## 2022-01-10 RX ADMIN — DILTIAZEM HYDROCHLORIDE 240 MG: 240 CAPSULE, COATED, EXTENDED RELEASE ORAL at 18:13

## 2022-01-10 RX ADMIN — WARFARIN SODIUM 2.5 MG: 2.5 TABLET ORAL at 18:13

## 2022-01-10 RX ADMIN — SODIUM CHLORIDE, POTASSIUM CHLORIDE, SODIUM LACTATE AND CALCIUM CHLORIDE 1000 ML: 600; 310; 30; 20 INJECTION, SOLUTION INTRAVENOUS at 18:13

## 2022-01-10 ASSESSMENT — ENCOUNTER SYMPTOMS
SINUS PRESSURE: 0
NAUSEA: 0
VOMITING: 0
SHORTNESS OF BREATH: 0
ABDOMINAL PAIN: 1
FEVER: 0
CONSTIPATION: 1
FATIGUE: 1
PALPITATIONS: 0
SORE THROAT: 0
HEADACHES: 0
DIAPHORESIS: 0
DIARRHEA: 0
CHILLS: 0
FREQUENCY: 0
DYSURIA: 0
WHEEZING: 0
COUGH: 0
WEAKNESS: 1
BLOOD IN STOOL: 0

## 2022-01-10 ASSESSMENT — MIFFLIN-ST. JEOR: SCORE: 1184.83

## 2022-01-10 NOTE — ED PROVIDER NOTES
History     Chief Complaint   Patient presents with     Abdominal Pain     HPI  Aditi Palacios is a 81 year old female who presents with history of anemia of chronic disease, atrial fibrillation hypertension, pacemaker, chronic sciatica lower back, urethral strictures for urine stenosis, diabetes mellitus, gastroesophageal reflux disease, hyponatremia hypothyroidism, hyperlipidemia.    She was seen on Saturday at the urgency center presenting for urinary retention.  A Hernández was placed and she had only been getting a trickle of urine out.  The urine catheter drained some amount of urine and that was then removed.  It was sent for her urinalysis which was negative.  She had no other significant findings.  She does see urology for history of urethral stricture.    She presents here because she has been having lower abdominal pain off and on since that time.  There is no radiation.  There is no nausea vomiting.  She has been feeling more generally weak and tired.  She is recently had laboratory testing in the [facility] system.  Apparently her TSH was normal.  Her electrolytes demonstrated a chronic kidney disease that has been persistent with GFR 32.  She had a borderline potassium at the time.  She has no significant nausea.  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  She has had a chronic hyponatremia.  She does have chronic constipation that is well managed with MiraLAX and Metamucil and with occasional use of milk of magnesia.  Her last stool was this morning small amount.  She did have small amounts of diarrhea over the last week.    She has no cough congestion upper respiratory symptoms.  There is no chest pain or shortness of breath.  There is no dysuria urgency or frequency.  There is decreased urination and a sense of hesitancy.  Is able to urinate and give a urine sample prior to my seeing the patient this morning.              Allergies:  Allergies    Allergen Reactions     Amitriptyline      Atenolol      Codeine      Coreg [Carvedilol]      CR only     Crestor [Rosuvastatin]      Doxycycline      Duloxetine      Fentanyl      Gabapentin      Kenalog [Triamcinolone]      Lotrel      Lyrica [Pregabalin]      Niaspan [Niacin]      Other [No Clinical Screening - See Comments]      Fentanyl/Versed combination     Other [Seasonal Allergies]      Beta blockers except for carvedilol     Percocet [Oxycodone-Acetaminophen]      Percodan [Oxycodone-Aspirin]      Premarin      Tape [Adhesive Tape]      Tramadol      Vioxx      Zocor [Simvastatin]        Problem List:    Patient Active Problem List    Diagnosis Date Noted     Breast mass, right 02/01/2017     Priority: Medium     Acute kidney injury (H) 01/01/2015     Priority: Medium     GI bleed 01/01/2015     Priority: Medium     Myalgia 07/14/2008     Priority: Medium        Past Medical History:    Past Medical History:   Diagnosis Date     Anxiety      Atrial fibrillation (H)      Cancer (H)      Fibromyalgia      Gastro-oesophageal reflux disease      Glaucoma      Hypertension      Hypothyroidism      Scoliosis      Vascular complications of mesenteric artery        Past Surgical History:    Past Surgical History:   Procedure Laterality Date     BACK SURGERY      c2-5 and 5-6 fusion     Billateral common illiac artery stent       BREAST SURGERY       EXTRACAPSULAR CATARACT EXTRATION WITH INTRAOCULAR LENS IMPLANT       GYN SURGERY      total hysterectomy     IMPLANT PACEMAKER      medtronic     IR ABDOMINAL AORTOGRAM  5/3/2005     IR EXTREMITY ANGIOGRAM BILATERAL  5/3/2005     IR MISCELLANEOUS PROCEDURE  5/3/2005     IR MISCELLANEOUS PROCEDURE  5/3/2005     ORTHOPEDIC SURGERY      L ankle     NH ESOPHAGOGASTRODUODENOSCOPY TRANSORAL DIAGNOSTIC      Description: Esophagogastroduodenoscopy;  Proc Date: 09/26/2007;  Comments: Normal upper endoscopy.     THORACOTOMY  12/18/01    L upper lobe     ZZC CERV SPINE  FUSN,ANTER,BELOW C2      Description: Cervical Vertebral Fusion;  Proc Date: 03/11/2008;  Comments: 4 level cervical fusion by [facility] Dr. Solo.     [facility] TOTAL ABDOM HYSTERECTOMY      Description: Total Abdominal Hysterectomy;  Recorded: 12/18/2009;       Family History:    Family History   Problem Relation Age of Onset     Unknown/Adopted Mother      Unknown/Adopted Father        Social History:  Marital Status:   [2]  Social History     Tobacco Use     Smoking status: Never Smoker     Smokeless tobacco: Never Used   Substance Use Topics     Alcohol use: Not Currently     Alcohol/week: 0.0 standard drinks     Drug use: Never        Medications:    ALPRAZolam (XANAX) 0.5 MG tablet  aspirin 81 MG chewable tablet  brinzolamide-brimonidine (SIMBRINZA) 1-0.2 % ophthalmic suspension  CARVEDILOL 12.5 MG OR TABS  Cholecalciferol (VITAMIN D3 PO)  diltiazem (TIAZAC) 240 MG CP24  Flecainide Acetate (TAMBOCOR PO)  fluticasone (FLONASE) 50 MCG/ACT nasal spray  FUROSEMIDE PO  HYDROcodone-acetaminophen (NORCO) 5-325 MG per tablet  hypromellose (GENTEAL) ophthalmic gel 0.3%  LEVOTHYROXINE SODIUM PO  lidocaine (LIDODERM) 5 % patch  LISINOPRIL PO  MELATONIN PO  omeprazole 20 MG tablet  polyethylene glycol (MIRALAX/GLYCOLAX) powder  polyethylene glycol 0.4%- propylene glycol 0.3% (SYSTANE ULTRA) 0.4-0.3 % SOLN ophthalmic solution  PRAVASTATIN SODIUM PO  psyllium (METAMUCIL) 58.6 % POWD  saccharomyces boulardii (FLORASTOR) 250 MG capsule  SPIRONOLACTONE PO  WARFARIN SODIUM PO  XALATAN 0.005 % OP SOLN          Review of Systems   Constitutional: Positive for fatigue. Negative for chills, diaphoresis and fever.   HENT: Negative for ear pain, sinus pressure and sore throat.    Eyes: Negative for visual disturbance.   Respiratory: Negative for cough, shortness of breath and wheezing.    Cardiovascular: Negative for chest pain and palpitations.   Gastrointestinal: Positive for abdominal pain and constipation. Negative  "for blood in stool, diarrhea, nausea and vomiting.   Genitourinary: Negative for dysuria, frequency and urgency.   Skin: Negative for rash.   Neurological: Positive for weakness. Negative for headaches.   All other systems reviewed and are negative.      Physical Exam   BP: 131/52  Pulse: 64  Temp: 98.2  F (36.8  C)  Resp: 18  Height: 162.6 cm (5' 4\")  Weight: 73.5 kg (162 lb)  SpO2: 97 %      Physical Exam  Constitutional:       General: She is in acute distress.      Appearance: She is not diaphoretic.   HENT:      Head: Atraumatic.   Eyes:      Conjunctiva/sclera: Conjunctivae normal.   Cardiovascular:      Rate and Rhythm: Normal rate and regular rhythm.      Heart sounds: No murmur heard.      Pulmonary:      Effort: Pulmonary effort is normal. No respiratory distress.      Breath sounds: Normal breath sounds. No stridor. No wheezing or rhonchi.   Abdominal:      General: There is no distension.      Palpations: There is no mass.      Tenderness: There is abdominal tenderness in the suprapubic area. There is no right CVA tenderness, left CVA tenderness or guarding.      Hernia: No hernia is present.   Musculoskeletal:      Cervical back: Neck supple.      Right lower leg: No edema.      Left lower leg: No edema.   Skin:     Coloration: Skin is not pale.      Findings: No rash.   Neurological:      General: No focal deficit present.      Mental Status: She is alert.      Motor: No weakness.         ED Course              ED Course as of 01/12/22 2023 Wed Jan 12, 2022   1329 Specific Gravity Urine: 1.012     Procedures                EKG Interpretation:      Interpreted by Julián Fox MD  EKG done at 1108 hrs. demonstrates a there appears to be possible's pacer spike present atrially but not consistently on EKG. there is a normal axis.  There is no significant ST change.  There is no significant T wave change.  There is a poor R progression V1 through V3.  There is no ectopy.  There is a wide QRS of 123 and " suspect this is paced.  Normal other intervals.  Impression suspect paced rhythm with wide complex QRS.  No other acute changes.  This is different than the 2020 ekg which was sinus.     Critical Care time:  none               Results for orders placed or performed during the hospital encounter of 01/10/22 (from the past 24 hour(s))   UA with Microscopic reflex to Culture    Specimen: Urine, Clean Catch   Result Value Ref Range    Color Urine Yellow Colorless, Straw, Light Yellow, Yellow    Appearance Urine Slightly Cloudy (A) Clear    Glucose Urine Negative Negative mg/dL    Bilirubin Urine Negative Negative    Ketones Urine Negative Negative mg/dL    Specific Gravity Urine 1.013 1.003 - 1.035    Blood Urine Negative Negative    pH Urine 5.0 5.0 - 7.0    Protein Albumin Urine Negative Negative mg/dL    Urobilinogen Urine Normal Normal, 2.0 mg/dL    Nitrite Urine Negative Negative    Leukocyte Esterase Urine Large (A) Negative    Bacteria Urine Few (A) None Seen /HPF    RBC Urine 6 (H) <=2 /HPF    WBC Urine 9 (H) <=5 /HPF    Squamous Epithelials Urine 5 (H) <=1 /HPF    Hyaline Casts Urine 10 (H) <=2 /LPF    Narrative    Urine Culture ordered based on laboratory criteria   POC US ABDOMEN LIMITED    Impression    [facility] Procedure Note      Limited Bedside ED Ultrasound of the Urinary Bladder:    PERFORMED BY: Dr. Julián Fox MD  INDICATIONS:  Suprapubic pain  PROBE: Low frequency convex probe  BODY LOCATION:  Abdomen  FINDINGS:  Visualization of the bladder in longitudinal and transverse views demonstrated a distended state.  The bladder dimensions measured: 3 cm width X 4 height cm X 4 cm length.  INTERPRETATION:  Total calculated volume was estimated at 50 cc.  The bladder is minimally distended.  IMAGE DOCUMENTATION: Images were archived to PACs system.      [facility] Procedure Note    Limited Bedside ED Renal Ultrasound:    PERFORMED BY: Dr. Julián Fox MD  INDICATIONS:  Abdominal  Pain  PROBE: Low frequency convex probe  BODY LOCATION:  Abdomen  FINDINGS:  The ultrasound was performed with longitudinal and transverse views.   Right Kidney:   Hydronephrosis:  None   Renal cyst:  Single  Left Kidney:   Hydronephrosis:  None   Renal cyst:  None  INTERPRETATION:  The evaluation of the kidneys was normal without evidence of hydronephrosis or cysts.  IMAGE DOCUMENTATION: Images were archived to PACs system.       Comprehensive metabolic panel   Result Value Ref Range    Sodium 129 (L) 133 - 144 mmol/L    Potassium 5.6 (H) 3.4 - 5.3 mmol/L    Chloride 99 94 - 109 mmol/L    Carbon Dioxide (CO2) 21 20 - 32 mmol/L    Anion Gap 9 3 - 14 mmol/L    Urea Nitrogen 61 (H) 7 - 30 mg/dL    Creatinine 3.50 (H) 0.52 - 1.04 mg/dL    Calcium 8.8 8.5 - 10.1 mg/dL    Glucose 116 (H) 70 - 99 mg/dL    Alkaline Phosphatase 69 40 - 150 U/L    AST 11 0 - 45 U/L    ALT 18 0 - 50 U/L    Protein Total 6.4 (L) 6.8 - 8.8 g/dL    Albumin 3.0 (L) 3.4 - 5.0 g/dL    Bilirubin Total 0.3 0.2 - 1.3 mg/dL    GFR Estimate 13 (L) >60 mL/min/1.73m2   Lipase   Result Value Ref Range    Lipase 85 73 - 393 U/L   Troponin I   Result Value Ref Range    Troponin I High Sensitivity 10 <54 ng/L   TSH with free T4 reflex   Result Value Ref Range    TSH 1.88 0.40 - 4.00 mU/L   INR   Result Value Ref Range    INR 1.77 (H) 0.85 - 1.15   [place] Draw    Narrative    The following orders were created for panel order [place] Draw.  Procedure                               Abnormality         Status                     ---------                               -----------         ------                     Extra Red Top Tube[[id number]]                               Final result               Extra Purple Top Tube[[id number]]                            Final result                 Please view results for these tests on the individual orders.   Extra Purple Top Tube   Result Value Ref Range    Hold Specimen [facility]    Extra Red Top Tube   Result Value Ref Range     Hold Specimen [facility]    CT Abdomen Pelvis w/o Contrast    Narrative    CT ABDOMEN PELVIS WITHOUT CONTRAST 1/10/2022 12:01 PM    CLINICAL HISTORY: Abdominal pain, acute, nonlocalized. Refuses  contrast.  TECHNIQUE: CT scan of the abdomen and pelvis was performed without IV  contrast. Multiplanar reformats were obtained. Dose reduction  techniques were used.  CONTRAST: None.    COMPARISON: CT abdomen and pelvis dated 2/27/2019.    FINDINGS:   LOWER CHEST: There are small bilateral pleural fluid collections with  adjacent compressive atelectasis in the posterior aspects of the  bilateral lungs. Pacer/defibrillator wires are seen in the heart.  There are thoracic aortic calcifications.    HEPATOBILIARY: The gallbladder is somewhat indistinct and may be some  stranding around the gallbladder. Cholecystitis is not excluded. No  cholelithiasis is seen. Common bile duct is mildly prominent at 0.8  cm. No definite biliary ductal dilatation or choledocholithiasis is  identified.    PANCREAS: Normal.    SPLEEN: Normal.    ADRENAL GLANDS: Normal.    KIDNEYS/BLADDER: Kidneys appear somewhat atrophic, similar to the  prior study. Right renal cyst is again noted also similar to the prior  study. Probable left renal cyst measuring up to approximately 1 cm has  an average density of -1 Hounsfield units (image 36 series 2) but was  not as well-seen on the prior study. No hydronephrosis,  nephrolithiasis, hydroureter, or ureteral calculus is identified.  Urinary bladder is grossly unremarkable.    BOWEL: The colon is grossly of normal caliber without pericolonic  inflammatory change to suggest acute diverticulitis or colitis.  Appendix is normal in appearance. Small bowel is of normal caliber and  appearance. Stomach contains a small amount of air and fluid, but is  otherwise unremarkable.    LYMPH NODES: Normal.    VASCULATURE: There are bilateral common iliac artery stents. There is  nonaneurysmal atherosclerosis.    PELVIC ORGANS:  Uterus is not seen, consistent with surgical history of  prior hysterectomy. Ovaries are not seen and are also likely  surgically absent. No adnexal masses are identified.    OTHER: There is a small amount of free fluid in the pelvic recesses of  uncertain clinical significance and etiology. No free air is  identified.    MUSCULOSKELETAL: No aggressive osseous lesions or acute osseous  fractures are identified. There are degenerative changes in the spine.      Impression    IMPRESSION:   1.  New small bilateral pleural fluid collections with adjacent  compressive atelectasis in the bilateral lungs of uncertain clinical  significance and etiology.  2.  New small amount of free intraperitoneal fluid in the pelvis.  3.  Probable cyst lateral left kidney was not as well-seen on the  prior study. Right renal cyst is again seen.  4.  Strandy densities around the gallbladder with indistinct edge.  Acute cholecystitis is certainly not excluded on the basis of this  study. Further evaluation with dedicated gallbladder ultrasound is  recommended. There may be some very mild reactive wall thickening of  the adjacent hepatic flexure of the colon. Colon is otherwise  unremarkable.    I discussed the possible gallbladder abnormality as well as the new  small bilateral pleural fluid collections and free intraperitoneal  fluid with Dr. Fox on 1/10/2022 at 12:15 PM.    ADARSH SPENCER MD         SYSTEM ID:  [id number]   XR Chest Port 1 View    Narrative    CHEST ONE VIEW PORTABLE   1/10/2022 12:49 PM     HISTORY: Pleural effusions on CT.    COMPARISON: PET/CT on 7/15/2010      Impression    IMPRESSION: Single AP view of the chest was obtained. Left chest wall  dual lead AICD noted. Mild enlargement of the cardiac silhouette.  Small left pleural effusion and associated basilar  atelectasis/consolidation. No significant right pleural effusion. No  significant pneumothorax.    RAIMUNDO SAUCEDO MD         SYSTEM ID:  [id number]   Abdomen  US, limited (RUQ only)    Narrative    US ABDOMEN LIMITED 1/10/2022 2:38 PM    CLINICAL HISTORY: Abdominal pain, abnormal abdominal CT.    TECHNIQUE: Limited abdominal ultrasound.    COMPARISON: CT abdomen and pelvis dated 1/10/2022.    FINDINGS:    GALLBLADDER: There is gallbladder wall thickening at 0.4 cm. Small  amount of pericholecystic fluid is seen. No sonographic Talley's sign  or cholelithiasis.    BILE DUCTS: There is no biliary dilatation. The common duct measures 4  mm.    LIVER: Normal where seen.    RIGHT KIDNEY: No hydronephrosis. There is a 3.1 x 3.0 x 4.5 cm simple  appearing cyst in the peripheral aspect of the right kidney.    PANCREAS: The visualized portions of the pancreas are normal.    No ascites. There is trace right pleural fluid.      Impression    IMPRESSION:  1.  Abnormal thickening of the gallbladder wall at 0.5 cm. There is  also a small amount of pericholecystic fluid. This can be associated  with acute cholecystitis but has a differential diagnosis also  including other etiology such as hypoalbuminemia, hepatitis,  congestive heart failure as well as others. No cholelithiasis or  sonographic Talley's sign to confirm acute cholecystitis.  2. Trace right pleural effusion.      ADARSH SPENCER MD         SYSTEM ID:  [id number]   Fractional excretion of sodium    Narrative    The following orders were created for panel order Fractional excretion of sodium.  Procedure                               Abnormality         Status                     ---------                               -----------         ------                     Fractional Excretion of ...[[id number]]                      Final result                 Please view results for these tests on the individual orders.   Asymptomatic COVID-19 Virus (Coronavirus) by PCR Nasopharyngeal    Specimen: Nasopharyngeal; Swab   Result Value Ref Range    SARS CoV2 PCR Negative Negative    Narrative    Testing was performed using the leandro   SARS-CoV-2 & Influenza A/B Assay on the leandro  [employer]ennifer  System.  This test should be ordered for the detection of SARS-COV-2 in individuals who meet SARS-CoV-2 clinical and/or epidemiological criteria. Test performance is unknown in asymptomatic patients.  This test is for in vitro diagnostic use under the FDA EUA for laboratories certified under CLIA to perform moderate and/or high complexity testing. This test has not been FDA cleared or approved.  A negative test does not rule out the presence of PCR inhibitors in the specimen or target RNA in concentration below the limit of detection for the assay. The possibility of a false negative should be considered if the patient's recent exposure or clinical presentation suggests COVID-19.  [facility] are certified under the Clinical Laboratory Improvement Amendments of 1988 (CLIA-88) as qualified to perform moderate and/or high complexity laboratory testing.   Fractional Excretion of Sodium   Result Value Ref Range    %FENA 1.8 %    Sodium Urine mmol/L 51 mmol/L    Creatinine Urine mg/dL 79 mg/dL       Medications - No data to display    Assessments & Plan (with Medical Decision Making)     MDM:  Aditi Palacios is a 81 year old female who presents with anemia of chronic disease, atrial fibrillation hypertension, pacemaker, chronic sciatica lower back, urethral strictures for urine stenosis, diabetes mellitus, gastroesophageal reflux disease, hyponatremia hypothyroidism, hyperlipidemia.    Who presents with history of recent urinary retention status post catheterized with normal urinalysis at that time.  She has numerous medication intolerances and history of prior acute kidney injury with contrast refuses contrast dye.  Presenting here with abdominal pain lower with bedside ultrasound not showing urinary retention.  No obvious hydronephrosis.  Abdominal aorta is difficult to visualize with ultrasound not well tolerated over the abdomen and does not  appear to be distended.  She has a somewhat collapsed IVC and decreased p.o. intake and does have a chronic limitation on fluid intake due to prior SIADH.  She likely has some findings of dehydration causing her weakness and fatigue.  She has had recent laboratory testing including thyroid testing was reassuring.  Recommended that we look broadly test with CT of the abdomen or pelvis, repeat urinalysis, perform urine culture for atypical findings.  Recheck INR.    Patient has a acute kidney injury with a near doubling of her baseline creatinine and a drop in her GFR from 32-13.  Have initiated IV hydration in the emergency department.  Holding diuretics and lisinopril.  The FENa is in the middle range.  There is a 1.8% FENa -which could still be consistent with prerenal azotemia.  She also does likely have some underlying intrinsic renal disease which may give this mixed picture.  Hydration will be overnight with a recheck and CHEM panel in the morning.  Suspect this will improve significantly.  She is on a fluid restriction and this may be too aggressive resulting in dehydration.    I see no other secondary causes for how she is feeling at this time.  Her gallbladder ultrasound done after CT showed possible changes demonstrates mixed findings including gallbladder wall thickening and pericholecystic fluid but no obvious sonographic Talley sign or other changes.  Discussed these findings with Dr. Fiore in general surgery who would recommend outpatient evaluation with HIDA scan.  Most the patient's discomfort today was lower abdomen.  Discussed that if she should get right upper quadrant or upper abdominal pain worsening then may need to repeat ultrasound and laboratory testing.    She will remain overnight here in the emergency department for IV hydration.  I reviewed her findings with Dr. Rose who is aware of the patient        I have ordered all the patient's home medications.    I have reviewed the nursing  notes.    I have reviewed the findings, diagnosis, plan and need for follow up with the patient.       New Prescriptions    No medications on file       Final diagnoses:   Acute kidney injury (H)   Prerenal azotemia   Abnormal ultrasound of gallbladder - consider Hida scan of gallbladder outpatient. follow-up clinic       1/10/2022   [facility] EMERGENCY DEPT     Julián Fox MD  01/10/22 2022       Julián Fox MD  01/12/22 2023

## 2022-01-10 NOTE — ED NOTES
Pt felt SOA, RR 30-40. Pt assisted to reposition to an upright position in bed. O2 sats 96-97% on RA. Reassurance provided, pt encouraged to take slow and controlled breaths.

## 2022-01-10 NOTE — ED PROVIDER NOTES
"     Emergency Department Patient Sign-out       Brief HPI:  This is a 81 year old female signed out to me by Dr. Fox .  See initial ED Provider note for details of the presentation.           Significant Events prior to my assuming care: 81 year female with urinary retention. Was evaluated two days ago for same. Here with low abdominal pain. 60-80cc of urine on POCUS. UA concerning for infection. Cr doubled in past 5 days. CT abd unremarkable. On 2nd liter of fluid. Holding furosemide, spironolactone and lisinopril.       Exam:   Patient Vitals for the past 24 hrs:   BP Temp Temp src Pulse Resp SpO2 Height Weight   01/10/22 1600 -- -- -- -- -- 95 % -- --   01/10/22 1500 -- -- -- -- -- 96 % -- --   01/10/22 1430 132/49 -- -- -- -- -- -- --   01/10/22 1100 -- -- -- -- -- 94 % -- --   01/10/22 0823 131/52 98.2  F (36.8  C) Oral 64 18 97 % 1.626 m (5' 4\") 73.5 kg (162 lb)           ED RESULTS:   Results for orders placed or performed during the hospital encounter of 01/10/22 (from the past 24 hour(s))   UA with Microscopic reflex to Culture     Status: Abnormal    Collection Time: 01/10/22 10:07 AM    Specimen: Urine, Clean Catch   Result Value Ref Range    Color Urine Yellow Colorless, Straw, Light Yellow, Yellow    Appearance Urine Slightly Cloudy (A) Clear    Glucose Urine Negative Negative mg/dL    Bilirubin Urine Negative Negative    Ketones Urine Negative Negative mg/dL    Specific Gravity Urine 1.013 1.003 - 1.035    Blood Urine Negative Negative    pH Urine 5.0 5.0 - 7.0    Protein Albumin Urine Negative Negative mg/dL    Urobilinogen Urine Normal Normal, 2.0 mg/dL    Nitrite Urine Negative Negative    Leukocyte Esterase Urine Large (A) Negative    Bacteria Urine Few (A) None Seen /HPF    RBC Urine 6 (H) <=2 /HPF    WBC Urine 9 (H) <=5 /HPF    Squamous Epithelials Urine 5 (H) <=1 /HPF    Hyaline Casts Urine 10 (H) <=2 /LPF    Narrative    Urine Culture ordered based on laboratory criteria   POC US ABDOMEN " LIMITED     Status: Abnormal    Collection Time: 01/10/22 10:17 AM    Impression    [facility] Procedure Note      Limited Bedside ED Ultrasound of the Urinary Bladder:    PERFORMED BY: Dr. Julián Fox MD  INDICATIONS:  Suprapubic pain  PROBE: Low frequency convex probe  BODY LOCATION:  Abdomen  FINDINGS:  Visualization of the bladder in longitudinal and transverse views demonstrated a distended state.  The bladder dimensions measured: 3 cm width X 4 height cm X 4 cm length.  INTERPRETATION:  Total calculated volume was estimated at 50 cc.  The bladder is minimally distended.  IMAGE DOCUMENTATION: Images were archived to PACs system.      [facility] Procedure Note    Limited Bedside ED Renal Ultrasound:    PERFORMED BY: Dr. Julián Fox MD  INDICATIONS:  Abdominal Pain  PROBE: Low frequency convex probe  BODY LOCATION:  Abdomen  FINDINGS:  The ultrasound was performed with longitudinal and transverse views.   Right Kidney:   Hydronephrosis:  None   Renal cyst:  Single  Left Kidney:   Hydronephrosis:  None   Renal cyst:  None  INTERPRETATION:  The evaluation of the kidneys was normal without evidence of hydronephrosis or cysts.  IMAGE DOCUMENTATION: Images were archived to PACs system.       Comprehensive metabolic panel     Status: Abnormal    Collection Time: 01/10/22 11:05 AM   Result Value Ref Range    Sodium 129 (L) 133 - 144 mmol/L    Potassium 5.6 (H) 3.4 - 5.3 mmol/L    Chloride 99 94 - 109 mmol/L    Carbon Dioxide (CO2) 21 20 - 32 mmol/L    Anion Gap 9 3 - 14 mmol/L    Urea Nitrogen 61 (H) 7 - 30 mg/dL    Creatinine 3.50 (H) 0.52 - 1.04 mg/dL    Calcium 8.8 8.5 - 10.1 mg/dL    Glucose 116 (H) 70 - 99 mg/dL    Alkaline Phosphatase 69 40 - 150 U/L    AST 11 0 - 45 U/L    ALT 18 0 - 50 U/L    Protein Total 6.4 (L) 6.8 - 8.8 g/dL    Albumin 3.0 (L) 3.4 - 5.0 g/dL    Bilirubin Total 0.3 0.2 - 1.3 mg/dL    GFR Estimate 13 (L) >60 mL/min/1.73m2   Lipase     Status: Normal    Collection Time:  01/10/22 11:05 AM   Result Value Ref Range    Lipase 85 73 - 393 U/L   Troponin I     Status: Normal    Collection Time: 01/10/22 11:05 AM   Result Value Ref Range    Troponin I High Sensitivity 10 <54 ng/L   TSH with free T4 reflex     Status: Normal    Collection Time: 01/10/22 11:05 AM   Result Value Ref Range    TSH 1.88 0.40 - 4.00 mU/L   INR     Status: Abnormal    Collection Time: 01/10/22 11:05 AM   Result Value Ref Range    INR 1.77 (H) 0.85 - 1.15   Todd Draw     Status: None    Collection Time: 01/10/22 11:06 AM    Narrative    The following orders were created for panel order Todd Draw.  Procedure                               Abnormality         Status                     ---------                               -----------         ------                     Extra Red Top Tube[[id number]]                               Final result               Extra Purple Top Tube[[id number]]                            Final result                 Please view results for these tests on the individual orders.   Extra Purple Top Tube     Status: None    Collection Time: 01/10/22 11:06 AM   Result Value Ref Range    Hold Specimen JIC    Extra Red Top Tube     Status: None    Collection Time: 01/10/22 11:07 AM   Result Value Ref Range    Hold Specimen JIC    CT Abdomen Pelvis w/o Contrast     Status: None (Preliminary result)    Collection Time: 01/10/22 12:01 PM    Narrative    CT ABDOMEN PELVIS WITHOUT CONTRAST 1/10/2022 12:01 PM    CLINICAL HISTORY: Abdominal pain, acute, nonlocalized. Refuses  contrast.  TECHNIQUE: CT scan of the abdomen and pelvis was performed without IV  contrast. Multiplanar reformats were obtained. Dose reduction  techniques were used.  CONTRAST: None.    COMPARISON: CT abdomen and pelvis dated 2/27/2019.    FINDINGS:   LOWER CHEST: There are small bilateral pleural fluid collections with  adjacent compressive atelectasis in the posterior aspects of the  bilateral lungs. Pacer/defibrillator  wires are seen in the heart.  There are thoracic aortic calcifications.    HEPATOBILIARY: The gallbladder is somewhat indistinct and may be some  stranding around the gallbladder. Cholecystitis is not excluded. No  cholelithiasis is seen. Common bile duct is mildly prominent at 0.8  cm. No definite biliary ductal dilatation or choledocholithiasis is  identified.    PANCREAS: Normal.    SPLEEN: Normal.    ADRENAL GLANDS: Normal.    KIDNEYS/BLADDER: Kidneys appear somewhat atrophic, similar to the  prior study. Right renal cyst is again noted also similar to the prior  study. Probable left renal cyst measuring up to approximately 1 cm has  an average density of -1 Hounsfield units (image 36 series 2) but was  not as well-seen on the prior study. No hydronephrosis,  nephrolithiasis, hydroureter, or ureteral calculus is identified.  Urinary bladder is grossly unremarkable.    BOWEL: The colon is grossly of normal caliber without pericolonic  inflammatory change to suggest acute diverticulitis or colitis.  Appendix is normal in appearance. Small bowel is of normal caliber and  appearance. Stomach contains a small amount of air and fluid, but is  otherwise unremarkable.    LYMPH NODES: Normal.    VASCULATURE: There are bilateral common iliac artery stents. There is  nonaneurysmal atherosclerosis.    PELVIC ORGANS: Uterus is not seen, consistent with surgical history of  prior hysterectomy. Ovaries are not seen and are also likely  surgically absent. No adnexal masses are identified.    OTHER: There is a small amount of free fluid in the pelvic recesses of  uncertain clinical significance and etiology. No free air is  identified.    MUSCULOSKELETAL: No aggressive osseous lesions or acute osseous  fractures are identified. There are degenerative changes in the spine.      Impression    IMPRESSION:   1.  New small bilateral pleural fluid collections with adjacent  compressive atelectasis in the bilateral lungs of uncertain  clinical  significant in etiology.  2.  New small amount of free intraperitoneal fluid in the pelvis.  3.  Probable cyst lateral left kidney was not as well-seen on the  prior study. Right renal cyst is again seen.  4.  Strandy densities around the gallbladder with indistinct edge.  Acute cholecystitis is certainly not excluded on the basis of this  study. Further evaluation with dedicated gallbladder ultrasound is  recommended. There may be some very mild reactive wall thickening of  the adjacent hepatic flexure of the colon. Colon is otherwise  unremarkable.    I discussed the possible gallbladder abnormality as well as the new  small bilateral pleural fluid collections and free intraperitoneal  fluid with Dr. Fox on 1/10/2022 at 12:15 PM.     XR Chest Port 1 View     Status: None    Collection Time: 01/10/22 12:49 PM    Narrative    CHEST ONE VIEW PORTABLE   1/10/2022 12:49 PM     HISTORY: Pleural effusions on CT.    COMPARISON: PET/CT on 7/15/2010      Impression    IMPRESSION: Single AP view of the chest was obtained. Left chest wall  dual lead AICD noted. Mild enlargement of the cardiac silhouette.  Small left pleural effusion and associated basilar  atelectasis/consolidation. No significant right pleural effusion. No  significant pneumothorax.    RAIMUNDO SAUCEDO MD         SYSTEM ID:  [id number]   Abdomen US, limited (RUQ only)     Status: None (Preliminary result)    Collection Time: 01/10/22  2:38 PM    Narrative    US ABDOMEN LIMITED 1/10/2022 2:38 PM    CLINICAL HISTORY: Abdominal pain, abnormal abdominal CT.    TECHNIQUE: Limited abdominal ultrasound.    COMPARISON: CT abdomen and pelvis dated 1/10/2022.    FINDINGS:    GALLBLADDER: There is gallbladder wall thickening at 0.4 cm. Small  amount of pericholecystic fluid is seen. No sonographic Talley's sign  or cholelithiasis.    BILE DUCTS: There is no biliary dilatation. The common duct measures 4  mm.    LIVER: Normal where seen.    RIGHT KIDNEY: No  hydronephrosis. There is a 3.1 x 3.0 x 4.5 cm simple  appearing cyst in the peripheral aspect of the right kidney.    PANCREAS: The visualized portions of the pancreas are normal.    No ascites. There is trace right pleural fluid.      Impression    IMPRESSION:  1.  Abnormal thickening of the gallbladder wall at 0.5 cm. There is  also a small amount of pericholecystic fluid. This can be associated  with acute cholecystitis but has a differential diagnosis also  including other etiology such as hypoalbuminemia, hepatitis,  congestive heart failure as well as others. No cholelithiasis or  sonographic Talley's sign to confirm acute cholecystitis.  2. Trace right pleural effusion.     Fractional excretion of sodium     Status: None    Collection Time: 01/10/22  4:17 PM    Narrative    The following orders were created for panel order Fractional excretion of sodium.  Procedure                               Abnormality         Status                     ---------                               -----------         ------                     Fractional Excretion of ...[[id number]]                      Final result                 Please view results for these tests on the individual orders.   Asymptomatic COVID-19 Virus (Coronavirus) by PCR Nasopharyngeal     Status: Normal    Collection Time: 01/10/22  4:17 PM    Specimen: Nasopharyngeal; Swab   Result Value Ref Range    SARS CoV2 PCR Negative Negative    Narrative    Testing was performed using the leandro  SARS-CoV-2 & Influenza A/B Assay on the leandro  Jennifer  System.  This test should be ordered for the detection of SARS-COV-2 in individuals who meet SARS-CoV-2 clinical and/or epidemiological criteria. Test performance is unknown in asymptomatic patients.  This test is for in vitro diagnostic use under the FDA EUA for laboratories certified under CLIA to perform moderate and/or high complexity testing. This test has not been FDA cleared or approved.  A negative test does not  rule out the presence of PCR inhibitors in the specimen or target RNA in concentration below the limit of detection for the assay. The possibility of a false negative should be considered if the patient's recent exposure or clinical presentation suggests COVID-19.  [facility] are certified under the Clinical Laboratory Improvement Amendments of 1988 (CLIA-88) as qualified to perform moderate and/or high complexity laboratory testing.   Fractional Excretion of Sodium     Status: None    Collection Time: 01/10/22  4:17 PM   Result Value Ref Range    %FENA 1.8 %    Sodium Urine mmol/L 51 mmol/L    Creatinine Urine mg/dL 79 mg/dL       ED MEDICATIONS:   Medications   iohexol (OMNIPAQUE) solution 25 mL (25 mLs Oral Not Given 1/10/22 1619)   lactated ringers infusion (1,000 mLs Intravenous New Bag 1/10/22 1622)   diltiazem ER COATED BEADS (CARDIZEM CD/CARTIA XT) 24 hr capsule 240 mg (has no administration in time range)   flecainide (TAMBOCOR) tablet 50 mg (has no administration in time range)   levothyroxine (SYNTHROID/LEVOTHROID) tablet 88 mcg (has no administration in time range)   pravastatin (PRAVACHOL) tablet 20 mg (has no administration in time range)   pantoprazole (PROTONIX) EC tablet 40 mg (has no administration in time range)   HYDROcodone-acetaminophen (NORCO) 5-325 MG per tablet 1 tablet (has no administration in time range)   Warfarin Therapy Reminder (Check START DATE - warfarin may be starting in the FUTURE) (has no administration in time range)   ALPRAZolam (XANAX) tablet 0.25 mg (has no administration in time range)   warfarin ANTICOAGULANT (COUMADIN) tablet 2.5 mg (has no administration in time range)   lactated ringers BOLUS 1,000 mL (0 mLs Intravenous Stopped 1/10/22 1511)         Impression:  No diagnosis found.    Plan:    Pending studies include :none.      Awaiting inpatient bed availability for acute renal injury.     On maintenance fluids and chem ordered for morning.      Care of patient signed out to Dr. Stanley for further cares while in the ED.       MD Milton Alvarado Richard J, MD  01/11/22 0108

## 2022-01-10 NOTE — TELEPHONE ENCOUNTER
Triage call  Patient calling to report she has not voided since she was at the Urgent care on 1/7/2021 she is having back pain at the kidney area and stomach burning.  She denies having a fever.  She has a poor appetite and is not drinking well.  She wondered if she has the flu but she is not coughing or short of breath.  She states that she did not see a Doctor in the urgent care but they did put a catheter in her and she had about 200 ml of urine at that time.    Per protocol Go to the Emergency department,Care advice given.  Verbalizes understanding and agrees with plan.    Stefanie Velázquez RN   [facility] Nurse Advisor  6:42 AM 1/10/2022    COVID 19 Nurse Triage Plan/Patient Instructions    Please be aware that novel coronavirus (COVID-19) may be circulating in the community. If you develop symptoms such as fever, cough, or SOB or if you have concerns about the presence of another infection including coronavirus (COVID-19), please contact your health care provider or visit [email or web address].     Disposition/Instructions    ED Visit recommended. Follow protocol based instructions.     Bring Your Own Device:  Please also bring your smart device(s) (smart phones, tablets, laptops) and their charging cables for your personal use and to communicate with your care team during your visit.    Thank you for taking steps to prevent the spread of this virus.  o Limit your contact with others.  o Wear a simple mask to cover your cough.  o Wash your hands well and often.    Resources    [facility]: About COVID-19: [email or web address]    CDC: What to Do If You're Sick: www.cdc.gov/coronavirus/2019-ncov/about/steps-when-sick.html    CDC: Ending Home Isolation: www.cdc.gov/coronavirus/2019-ncov/hcp/disposition-in-home-patients.html     CDC: Caring for Someone: www.cdc.gov/coronavirus/2019-ncov/if-you-are-sick/care-for-someone.html     [facility]: Interim Guidance for Hospital Discharge to Home:  www.[email or web address].[email or web address]/diseases/coronavirus/hcp/hospdischarge.pdf    [facility] clinical trials (COVID-19 research studies): [email or web address]     Below are the COVID-19 hotlines at the [place] [facility] ([facility]). Interpreters are available.   o For health questions: Call 947-764-2820 or 1-773.622.6921 (7 a.m. to 7 p.m.)  o For questions about schools and childcare: Call 383-715-4107 or 1-414.677.4844 (7 a.m. to 7 p.m.)   Reason for Disposition    Decreased urination and drinking very little and dehydration suspected (e.g., dark urine, no urine > 12 hours, very dry mouth, very lightheaded)    Additional Information    Negative: Shock suspected (e.g., cold/pale/clammy skin, too weak to stand, low BP, rapid pulse)    Negative: Sounds like a life-threatening emergency to the triager    Negative: Followed a genital area injury    Negative: Followed a genital area injury (penis, scrotum)    Negative: Vaginal discharge    Negative: Pus (white, yellow) or bloody discharge from end of penis    Negative: Discomfort (pain, burning or stinging) when passing urine and pregnant    Negative: Discomfort (pain, burning or stinging) when passing urine and female    Negative: Discomfort (pain, burning or stinging) when passing urine and male    Negative: Pain or itching in the vulvar area    Negative: Pain in scrotum is main symptom    Negative: Blood in the urine is main symptom    Negative: Symptoms arising from use of a urinary catheter (Hernández or Coude)    Negative: Unable to urinate (or only a few drops) > 4 hours and bladder feels very full (e.g., palpable bladder or strong urge to urinate)    Protocols used: URINARY SYMPTOMS-A-OH

## 2022-01-10 NOTE — PHARMACY-ANTICOAGULATION SERVICE
Clinical Pharmacy - Warfarin Dosing Consult     Pharmacy has been consulted to manage this patient s warfarin therapy.  Indication: Atrial Fibrillation  Therapy Goal: INR 2-3  Provider/Team: [facility]: [facility]  Warfarin Prior to Admission: Yes  Warfarin PTA Regimen: 5 mg on Wed. 2.5 mg all other days (per med rec/significant other)  Recent documented change in oral intake/nutrition: Unknown    INR   Date Value Ref Range Status   01/10/2022 1.77 (H) 0.85 - 1.15 Final   04/14/2020 2.41 (H) 0.86 - 1.14 Final       Recommend warfarin 2.5 mg today.  Pharmacy will monitor Aditi B Laseter daily and order warfarin doses to achieve specified goal.      Please contact pharmacy as soon as possible if the warfarin needs to be held for a procedure or if the warfarin goals change.      Thank you,   Elvira Garcia [facility] on 1/10/2022 at 5:21 PM

## 2022-01-10 NOTE — LETTER
Transition Communication Hand-off for Care Transitions to Next Level of Care Provider    Name: Aditi Palacios  : 1940  MRN #: [id number]  Primary Care Provider: Amanuel Mckenzie     Primary Clinic: [facility] [address]  [facility] [address]     Reason for Hospitalization:  Prerenal azotemia [R79.89]  Acute kidney injury (H) [N17.9]  Abnormal ultrasound of gallbladder [R93.2]  Admit Date/Time: 1/10/2022  9:41 AM  Discharge Date: 1/15/22  Payor Source: Payor: MEDICARE / Plan: MEDICARE / Product Type: Medicare /     Reason for Communication Hand-off Referral: Discharge with home care    Discharge Plan:  Home with [facility] (Phone: [phone])       Concern for non-adherence with plan of care:   Y/N no  Discharge Needs Assessment:  Needs      Most Recent Value   Equipment Currently Used at Home wheelchair, power   # of Referrals Placed by [facility] External Care Coordination, Post Acute Facilities          Any outstanding tests or procedures:        Referrals     Future Labs/Procedures    Home Care Nursing Referral     Comments:    RN, PT, HHA to begin 24 - 48 hours after discharge.    PLEASE EVALUATE AND TREAT (Evaluation timeline is 24 - 48 hrs. Please call if there is need for a variance to this timeline).    Current Outpatient Medications:  neomycin-polymyxin-hydrocortisone (CORTISPORIN) 3.5-90564-4 otic suspension, Place 3 drops into the right ear 4 times daily for 5 days, Disp: 3 mL, Rfl: 0      Patient Active Problem List:     Myalgia     Acute on chronic renal failure (H)     GI bleed     Breast mass, right     Urinary retention     Abnormal ultrasound of gallbladder     LALITA (generalized anxiety disorder)     Peripheral vascular disease (H)     Occlusion of superior mesenteric artery (H)     Stenosis of inferior mesenteric artery (H)     Fibromuscular dysplasia of right renal artery (H)     Celiac artery stenosis (H)     Subclavian steal syndrome of left  subclavian artery     Iliac artery occlusion, bilateral, s/p stents     Sick sinus syndrome (H)     Cardiac pacemaker in situ     CKD (chronic kidney disease) stage 3, GFR 30-59 ml/min (H)     History of migraine     DDD (degenerative disc disease), lumbar     Essential tremor     Fibromyalgia     Esophageal reflux     Glaucoma     Hyperlipidemia     Hypothyroidism     Irritable bowel syndrome with constipation     Tricuspid regurgitation     Mitral regurgitation     History of lung cancer     Paroxysmal atrial fibrillation (H)     Resistant hypertension     Scoliosis     SIADH (syndrome of inappropriate ADH production) (H)     TMJ (temporomandibular joint syndrome)     Flash pulmonary edema (H)     Chronic hyponatremia     (HFpEF) heart failure with preserved ejection fraction (H)     Acute respiratory failure with hypoxia (H)     Chronic anticoagulation     Pulmonary hypertension (H)      Documentation of Face to Face and Certification for Home Health Services    I certify that patient, Aditi Palacios is under my care and that I, or a Nurse Practitioner or Physician's Assistant working with me, had a face-to-face encounter that meets the physician face-to-face encounter requirements with this patient on: 1/15/2022.    This encounter with the patient was in whole, or in part, for the following medical condition, which is the primary reason for Home Health Care: acute real failure.    I certify that, based on my findings, the following services are medically necessary Home Health Services: Nursing and Physical Therapy    My clinical findings support the need for the above services because: Nurse is needed: To assess vital signs and respiratory status after changes in medications or other medical regimen.. and Physical Therapy Services are needed to assess and treat the following functional impairments: generalized weakness.    Further, I certify that my clinical findings support that this patient is homebound  (i.e. absences from home require considerable and taxing effort and are for medical reasons or Taoism services or infrequently or of short duration when for other reasons) because: Requires assistance of another person or specialized equipment to access medical services because patient: Is unable to exit home safely on own due to: weakness..    Based on the above findings, I certify that this patient is confined to the home and needs intermittent skilled nursing care, physical therapy and/or speech therapy.  The patient is under my care, and I have initiated the establishment of the plan of care.  This patient will be followed by a physician who will periodically review the plan of care.    Physician/Provider to provide follow up care: Amanuel Mckenzie    Responsible [place] certified Physician at time of discharge: Jc Ko MD    Please be aware that coverage of these services is subject to the terms and limitations of your health insurance plan.  Call member services at your health plan with any benefit or coverage questions.  Please call to schedule your appointment                  Ivette Dougherty RN    AVS/Discharge Summary is the source of truth; this is a helpful guide for improved communication of patient story

## 2022-01-10 NOTE — ED TRIAGE NOTES
Pt c/o increased abd. Pain,burning and  Decreased urine output. Pt states has HX of kidney disease . Pt [facility] and has nephrology appt on 1/12

## 2022-01-11 ENCOUNTER — ANCILLARY PROCEDURE (OUTPATIENT)
Dept: ULTRASOUND IMAGING | Facility: CLINIC | Age: 82
End: 2022-01-11
Attending: FAMILY MEDICINE
Payer: MEDICARE

## 2022-01-11 LAB
ALBUMIN UR-MCNC: 30 MG/DL
ANION GAP SERPL CALCULATED.3IONS-SCNC: 6 MMOL/L (ref 3–14)
ANION GAP SERPL CALCULATED.3IONS-SCNC: 9 MMOL/L (ref 3–14)
ANION GAP SERPL CALCULATED.3IONS-SCNC: 9 MMOL/L (ref 3–14)
APPEARANCE UR: CLEAR
BACTERIA UR CULT: NORMAL
BILIRUB UR QL STRIP: NEGATIVE
BUN SERPL-MCNC: 55 MG/DL (ref 7–30)
BUN SERPL-MCNC: 55 MG/DL (ref 7–30)
BUN SERPL-MCNC: 59 MG/DL (ref 7–30)
CALCIUM SERPL-MCNC: 8.2 MG/DL (ref 8.5–10.1)
CALCIUM SERPL-MCNC: 8.4 MG/DL (ref 8.5–10.1)
CALCIUM SERPL-MCNC: 8.5 MG/DL (ref 8.5–10.1)
CHLORIDE BLD-SCNC: 98 MMOL/L (ref 94–109)
CHLORIDE BLD-SCNC: 98 MMOL/L (ref 94–109)
CHLORIDE BLD-SCNC: 99 MMOL/L (ref 94–109)
CO2 SERPL-SCNC: 20 MMOL/L (ref 20–32)
CO2 SERPL-SCNC: 21 MMOL/L (ref 20–32)
CO2 SERPL-SCNC: 21 MMOL/L (ref 20–32)
COLOR UR AUTO: YELLOW
CREAT SERPL-MCNC: 2.57 MG/DL (ref 0.52–1.04)
CREAT SERPL-MCNC: 2.63 MG/DL (ref 0.52–1.04)
CREAT SERPL-MCNC: 2.92 MG/DL (ref 0.52–1.04)
GFR SERPL CREATININE-BSD FRML MDRD: 16 ML/MIN/1.73M2
GFR SERPL CREATININE-BSD FRML MDRD: 18 ML/MIN/1.73M2
GFR SERPL CREATININE-BSD FRML MDRD: 18 ML/MIN/1.73M2
GLUCOSE BLD-MCNC: 119 MG/DL (ref 70–99)
GLUCOSE BLD-MCNC: 126 MG/DL (ref 70–99)
GLUCOSE BLD-MCNC: 144 MG/DL (ref 70–99)
GLUCOSE UR STRIP-MCNC: NEGATIVE MG/DL
HGB UR QL STRIP: NEGATIVE
HOLD SPECIMEN: NORMAL
INR PPP: 2.42 (ref 0.85–1.15)
KETONES UR STRIP-MCNC: NEGATIVE MG/DL
LEUKOCYTE ESTERASE UR QL STRIP: NEGATIVE
NITRATE UR QL: NEGATIVE
PH UR STRIP: 5 [PH] (ref 5–7)
POTASSIUM BLD-SCNC: 5.5 MMOL/L (ref 3.4–5.3)
POTASSIUM BLD-SCNC: 5.7 MMOL/L (ref 3.4–5.3)
POTASSIUM BLD-SCNC: 6.1 MMOL/L (ref 3.4–5.3)
RBC URINE: 1 /HPF
SODIUM SERPL-SCNC: 126 MMOL/L (ref 133–144)
SODIUM SERPL-SCNC: 127 MMOL/L (ref 133–144)
SODIUM SERPL-SCNC: 128 MMOL/L (ref 133–144)
SP GR UR STRIP: 1.01 (ref 1–1.03)
UROBILINOGEN UR STRIP-MCNC: NORMAL MG/DL
WBC URINE: <1 /HPF

## 2022-01-11 PROCEDURE — 81001 URINALYSIS AUTO W/SCOPE: CPT | Performed by: FAMILY MEDICINE

## 2022-01-11 PROCEDURE — 93005 ELECTROCARDIOGRAM TRACING: CPT | Performed by: FAMILY MEDICINE

## 2022-01-11 PROCEDURE — 96374 THER/PROPH/DIAG INJ IV PUSH: CPT | Mod: 59 | Performed by: FAMILY MEDICINE

## 2022-01-11 PROCEDURE — 93010 ELECTROCARDIOGRAM REPORT: CPT | Mod: 76 | Performed by: FAMILY MEDICINE

## 2022-01-11 PROCEDURE — 85610 PROTHROMBIN TIME: CPT | Performed by: FAMILY MEDICINE

## 2022-01-11 PROCEDURE — 250N000013 HC RX MED GY IP 250 OP 250 PS 637: Performed by: FAMILY MEDICINE

## 2022-01-11 PROCEDURE — 82310 ASSAY OF CALCIUM: CPT | Performed by: FAMILY MEDICINE

## 2022-01-11 PROCEDURE — 250N000013 HC RX MED GY IP 250 OP 250 PS 637: Performed by: EMERGENCY MEDICINE

## 2022-01-11 PROCEDURE — 93005 ELECTROCARDIOGRAM TRACING: CPT | Mod: 76 | Performed by: FAMILY MEDICINE

## 2022-01-11 PROCEDURE — 250N000011 HC RX IP 250 OP 636: Performed by: FAMILY MEDICINE

## 2022-01-11 PROCEDURE — 96361 HYDRATE IV INFUSION ADD-ON: CPT | Performed by: FAMILY MEDICINE

## 2022-01-11 PROCEDURE — 93010 ELECTROCARDIOGRAM REPORT: CPT | Performed by: FAMILY MEDICINE

## 2022-01-11 PROCEDURE — 258N000003 HC RX IP 258 OP 636: Performed by: FAMILY MEDICINE

## 2022-01-11 PROCEDURE — 36415 COLL VENOUS BLD VENIPUNCTURE: CPT | Performed by: FAMILY MEDICINE

## 2022-01-11 RX ORDER — FUROSEMIDE 10 MG/ML
20 INJECTION INTRAMUSCULAR; INTRAVENOUS DAILY
Status: DISCONTINUED | OUTPATIENT
Start: 2022-01-12 | End: 2022-01-12 | Stop reason: ALTCHOICE

## 2022-01-11 RX ORDER — HYDROCODONE BITARTRATE AND ACETAMINOPHEN 5; 325 MG/1; MG/1
1 TABLET ORAL ONCE
Status: DISCONTINUED | OUTPATIENT
Start: 2022-01-11 | End: 2022-01-11

## 2022-01-11 RX ORDER — FUROSEMIDE 10 MG/ML
20 INJECTION INTRAMUSCULAR; INTRAVENOUS ONCE
Status: COMPLETED | OUTPATIENT
Start: 2022-01-11 | End: 2022-01-11

## 2022-01-11 RX ORDER — WARFARIN SODIUM 1 MG/1
1 TABLET ORAL
Status: COMPLETED | OUTPATIENT
Start: 2022-01-11 | End: 2022-01-11

## 2022-01-11 RX ORDER — LIDOCAINE 4 G/G
3 PATCH TOPICAL
Status: DISCONTINUED | OUTPATIENT
Start: 2022-01-11 | End: 2022-01-15 | Stop reason: HOSPADM

## 2022-01-11 RX ADMIN — WARFARIN SODIUM 1 MG: 1 TABLET ORAL at 17:47

## 2022-01-11 RX ADMIN — SODIUM CHLORIDE, POTASSIUM CHLORIDE, SODIUM LACTATE AND CALCIUM CHLORIDE 1000 ML: 600; 310; 30; 20 INJECTION, SOLUTION INTRAVENOUS at 05:59

## 2022-01-11 RX ADMIN — PRAVASTATIN SODIUM 20 MG: 20 TABLET ORAL at 17:46

## 2022-01-11 RX ADMIN — HYDROCODONE BITARTRATE AND ACETAMINOPHEN 1 TABLET: 5; 325 TABLET ORAL at 16:31

## 2022-01-11 RX ADMIN — HYDROCODONE BITARTRATE AND ACETAMINOPHEN 1 TABLET: 5; 325 TABLET ORAL at 08:22

## 2022-01-11 RX ADMIN — PANTOPRAZOLE SODIUM 40 MG: 40 TABLET, DELAYED RELEASE ORAL at 08:21

## 2022-01-11 RX ADMIN — Medication 5 MG: at 03:04

## 2022-01-11 RX ADMIN — FLECAINIDE ACETATE 50 MG: 50 TABLET ORAL at 08:22

## 2022-01-11 RX ADMIN — FUROSEMIDE 20 MG: 10 INJECTION, SOLUTION INTRAMUSCULAR; INTRAVENOUS at 09:32

## 2022-01-11 RX ADMIN — HYDROCODONE BITARTRATE AND ACETAMINOPHEN 1 TABLET: 5; 325 TABLET ORAL at 02:33

## 2022-01-11 RX ADMIN — LIDOCAINE 3 PATCH: 560 PATCH PERCUTANEOUS; TOPICAL; TRANSDERMAL at 03:05

## 2022-01-11 RX ADMIN — LEVOTHYROXINE SODIUM 88 MCG: 0.09 TABLET ORAL at 08:21

## 2022-01-11 RX ADMIN — FLECAINIDE ACETATE 50 MG: 50 TABLET ORAL at 17:46

## 2022-01-11 RX ADMIN — SODIUM CHLORIDE, POTASSIUM CHLORIDE, SODIUM LACTATE AND CALCIUM CHLORIDE 1000 ML: 600; 310; 30; 20 INJECTION, SOLUTION INTRAVENOUS at 12:08

## 2022-01-11 RX ADMIN — DILTIAZEM HYDROCHLORIDE 240 MG: 240 CAPSULE, COATED, EXTENDED RELEASE ORAL at 17:45

## 2022-01-11 NOTE — ED NOTES
January 11, 2022 at 0648  Notified per telephone of critical lab value. Dr. Logan notified.   Test :   potassium  Value: 6.1.  Plan:   No new orders at this time.

## 2022-01-11 NOTE — ED PROVIDER NOTES
"     Emergency Department Patient Sign-out       Brief HPI:  This is a 81 year old female signed out to me by Dr. Harmon .  See initial ED Provider note for details of the presentation.            Significant Events prior to my assuming care: none      Exam:   Patient Vitals for the past 24 hrs:   BP Temp Temp src Pulse Resp SpO2 Height Weight   01/10/22 2150 -- -- -- -- 18 92 % -- --   01/10/22 2103 -- -- -- -- 18 -- -- --   01/10/22 1800 -- -- -- -- -- 97 % -- --   01/10/22 1700 -- -- -- -- -- 96 % -- --   01/10/22 1600 -- -- -- -- -- 95 % -- --   01/10/22 1500 -- -- -- -- -- 96 % -- --   01/10/22 1430 132/49 -- -- -- -- -- -- --   01/10/22 1100 -- -- -- -- -- 94 % -- --   01/10/22 0823 131/52 98.2  F (36.8  C) Oral 64 18 97 % 1.626 m (5' 4\") 73.5 kg (162 lb)           ED RESULTS:   Results for orders placed or performed during the hospital encounter of 01/10/22 (from the past 24 hour(s))   UA with Microscopic reflex to Culture     Status: Abnormal    Collection Time: 01/10/22 10:07 AM    Specimen: Urine, Clean Catch   Result Value Ref Range    Color Urine Yellow Colorless, Straw, Light Yellow, Yellow    Appearance Urine Slightly Cloudy (A) Clear    Glucose Urine Negative Negative mg/dL    Bilirubin Urine Negative Negative    Ketones Urine Negative Negative mg/dL    Specific Gravity Urine 1.013 1.003 - 1.035    Blood Urine Negative Negative    pH Urine 5.0 5.0 - 7.0    Protein Albumin Urine Negative Negative mg/dL    Urobilinogen Urine Normal Normal, 2.0 mg/dL    Nitrite Urine Negative Negative    Leukocyte Esterase Urine Large (A) Negative    Bacteria Urine Few (A) None Seen /HPF    RBC Urine 6 (H) <=2 /HPF    WBC Urine 9 (H) <=5 /HPF    Squamous Epithelials Urine 5 (H) <=1 /HPF    Hyaline Casts Urine 10 (H) <=2 /LPF    Narrative    Urine Culture ordered based on laboratory criteria   POC US ABDOMEN LIMITED     Status: Abnormal    Collection Time: 01/10/22 10:17 AM    [facility] Procedure " Note      Limited Bedside ED Ultrasound of the Urinary Bladder:    PERFORMED BY: Dr. Julián Fox MD  INDICATIONS:  Suprapubic pain  PROBE: Low frequency convex probe  BODY LOCATION:  Abdomen  FINDINGS:  Visualization of the bladder in longitudinal and transverse views demonstrated a distended state.  The bladder dimensions measured: 3 cm width X 4 height cm X 4 cm length.  INTERPRETATION:  Total calculated volume was estimated at 50 cc.  The bladder is minimally distended.  IMAGE DOCUMENTATION: Images were archived to PACs system.      [facility] Procedure Note    Limited Bedside ED Renal Ultrasound:    PERFORMED BY: Dr. Julián Fox MD  INDICATIONS:  Abdominal Pain  PROBE: Low frequency convex probe  BODY LOCATION:  Abdomen  FINDINGS:  The ultrasound was performed with longitudinal and transverse views.   Right Kidney:   Hydronephrosis:  None   Renal cyst:  Single  Left Kidney:   Hydronephrosis:  None   Renal cyst:  None  INTERPRETATION:  The evaluation of the kidneys was normal without evidence of hydronephrosis or cysts.  IMAGE DOCUMENTATION: Images were archived to PACs system.       Comprehensive metabolic panel     Status: Abnormal    Collection Time: 01/10/22 11:05 AM   Result Value Ref Range    Sodium 129 (L) 133 - 144 mmol/L    Potassium 5.6 (H) 3.4 - 5.3 mmol/L    Chloride 99 94 - 109 mmol/L    Carbon Dioxide (CO2) 21 20 - 32 mmol/L    Anion Gap 9 3 - 14 mmol/L    Urea Nitrogen 61 (H) 7 - 30 mg/dL    Creatinine 3.50 (H) 0.52 - 1.04 mg/dL    Calcium 8.8 8.5 - 10.1 mg/dL    Glucose 116 (H) 70 - 99 mg/dL    Alkaline Phosphatase 69 40 - 150 U/L    AST 11 0 - 45 U/L    ALT 18 0 - 50 U/L    Protein Total 6.4 (L) 6.8 - 8.8 g/dL    Albumin 3.0 (L) 3.4 - 5.0 g/dL    Bilirubin Total 0.3 0.2 - 1.3 mg/dL    GFR Estimate 13 (L) >60 mL/min/1.73m2   Lipase     Status: Normal    Collection Time: 01/10/22 11:05 AM   Result Value Ref Range    Lipase 85 73 - 393 U/L   Troponin I     Status: Normal    Collection  Time: 01/10/22 11:05 AM   Result Value Ref Range    Troponin I High Sensitivity 10 <54 ng/L   TSH with free T4 reflex     Status: Normal    Collection Time: 01/10/22 11:05 AM   Result Value Ref Range    TSH 1.88 0.40 - 4.00 mU/L   INR     Status: Abnormal    Collection Time: 01/10/22 11:05 AM   Result Value Ref Range    INR 1.77 (H) 0.85 - 1.15   [place] Draw     Status: None    Collection Time: 01/10/22 11:06 AM    Narrative    The following orders were created for panel order [place] Draw.  Procedure                               Abnormality         Status                     ---------                               -----------         ------                     Extra Red Top Tube[[id number]]                               Final result               Extra Purple Top Tube[[id number]]                            Final result                 Please view results for these tests on the individual orders.   Extra Purple Top Tube     Status: None    Collection Time: 01/10/22 11:06 AM   Result Value Ref Range    Hold Specimen JIC    Extra Red Top Tube     Status: None    Collection Time: 01/10/22 11:07 AM   Result Value Ref Range    Hold Specimen JIC    CT Abdomen Pelvis w/o Contrast     Status: None    Collection Time: 01/10/22 12:01 PM    Narrative    CT ABDOMEN PELVIS WITHOUT CONTRAST 1/10/2022 12:01 PM    CLINICAL HISTORY: Abdominal pain, acute, nonlocalized. Refuses  contrast.  TECHNIQUE: CT scan of the abdomen and pelvis was performed without IV  contrast. Multiplanar reformats were obtained. Dose reduction  techniques were used.  CONTRAST: None.    COMPARISON: CT abdomen and pelvis dated 2/27/2019.    FINDINGS:   LOWER CHEST: There are small bilateral pleural fluid collections with  adjacent compressive atelectasis in the posterior aspects of the  bilateral lungs. Pacer/defibrillator wires are seen in the heart.  There are thoracic aortic calcifications.    HEPATOBILIARY: The gallbladder is somewhat indistinct and may  be some  stranding around the gallbladder. Cholecystitis is not excluded. No  cholelithiasis is seen. Common bile duct is mildly prominent at 0.8  cm. No definite biliary ductal dilatation or choledocholithiasis is  identified.    PANCREAS: Normal.    SPLEEN: Normal.    ADRENAL GLANDS: Normal.    KIDNEYS/BLADDER: Kidneys appear somewhat atrophic, similar to the  prior study. Right renal cyst is again noted also similar to the prior  study. Probable left renal cyst measuring up to approximately 1 cm has  an average density of -1 Hounsfield units (image 36 series 2) but was  not as well-seen on the prior study. No hydronephrosis,  nephrolithiasis, hydroureter, or ureteral calculus is identified.  Urinary bladder is grossly unremarkable.    BOWEL: The colon is grossly of normal caliber without pericolonic  inflammatory change to suggest acute diverticulitis or colitis.  Appendix is normal in appearance. Small bowel is of normal caliber and  appearance. Stomach contains a small amount of air and fluid, but is  otherwise unremarkable.    LYMPH NODES: Normal.    VASCULATURE: There are bilateral common iliac artery stents. There is  nonaneurysmal atherosclerosis.    PELVIC ORGANS: Uterus is not seen, consistent with surgical history of  prior hysterectomy. Ovaries are not seen and are also likely  surgically absent. No adnexal masses are identified.    OTHER: There is a small amount of free fluid in the pelvic recesses of  uncertain clinical significance and etiology. No free air is  identified.    MUSCULOSKELETAL: No aggressive osseous lesions or acute osseous  fractures are identified. There are degenerative changes in the spine.      Impression    IMPRESSION:   1.  New small bilateral pleural fluid collections with adjacent  compressive atelectasis in the bilateral lungs of uncertain clinical  significance and etiology.  2.  New small amount of free intraperitoneal fluid in the pelvis.  3.  Probable cyst lateral left  kidney was not as well-seen on the  prior study. Right renal cyst is again seen.  4.  Strandy densities around the gallbladder with indistinct edge.  Acute cholecystitis is certainly not excluded on the basis of this  study. Further evaluation with dedicated gallbladder ultrasound is  recommended. There may be some very mild reactive wall thickening of  the adjacent hepatic flexure of the colon. Colon is otherwise  unremarkable.    I discussed the possible gallbladder abnormality as well as the new  small bilateral pleural fluid collections and free intraperitoneal  fluid with Dr. Fox on 1/10/2022 at 12:15 PM.    ADARSH SPENCER MD         SYSTEM ID:  [id number]   XR Chest Port 1 View     Status: None    Collection Time: 01/10/22 12:49 PM    Narrative    CHEST ONE VIEW PORTABLE   1/10/2022 12:49 PM     HISTORY: Pleural effusions on CT.    COMPARISON: PET/CT on 7/15/2010      Impression    IMPRESSION: Single AP view of the chest was obtained. Left chest wall  dual lead AICD noted. Mild enlargement of the cardiac silhouette.  Small left pleural effusion and associated basilar  atelectasis/consolidation. No significant right pleural effusion. No  significant pneumothorax.    RAIMUNDO SAUCEDO MD         SYSTEM ID:  [id number]   Abdomen US, limited (RUQ only)     Status: None    Collection Time: 01/10/22  2:38 PM    Narrative    US ABDOMEN LIMITED 1/10/2022 2:38 PM    CLINICAL HISTORY: Abdominal pain, abnormal abdominal CT.    TECHNIQUE: Limited abdominal ultrasound.    COMPARISON: CT abdomen and pelvis dated 1/10/2022.    FINDINGS:    GALLBLADDER: There is gallbladder wall thickening at 0.4 cm. Small  amount of pericholecystic fluid is seen. No sonographic Talley's sign  or cholelithiasis.    BILE DUCTS: There is no biliary dilatation. The common duct measures 4  mm.    LIVER: Normal where seen.    RIGHT KIDNEY: No hydronephrosis. There is a 3.1 x 3.0 x 4.5 cm simple  appearing cyst in the peripheral aspect of the right  kidney.    PANCREAS: The visualized portions of the pancreas are normal.    No ascites. There is trace right pleural fluid.      Impression    IMPRESSION:  1.  Abnormal thickening of the gallbladder wall at 0.5 cm. There is  also a small amount of pericholecystic fluid. This can be associated  with acute cholecystitis but has a differential diagnosis also  including other etiology such as hypoalbuminemia, hepatitis,  congestive heart failure as well as others. No cholelithiasis or  sonographic Talley's sign to confirm acute cholecystitis.  2. Trace right pleural effusion.      ADARSH SPENCER MD         SYSTEM ID:  [id number]   Fractional excretion of sodium     Status: None    Collection Time: 01/10/22  4:17 PM    Narrative    The following orders were created for panel order Fractional excretion of sodium.  Procedure                               Abnormality         Status                     ---------                               -----------         ------                     Fractional Excretion of ...[[id number]]                      Final result                 Please view results for these tests on the individual orders.   Asymptomatic COVID-19 Virus (Coronavirus) by PCR Nasopharyngeal     Status: Normal    Collection Time: 01/10/22  4:17 PM    Specimen: Nasopharyngeal; Swab   Result Value Ref Range    SARS CoV2 PCR Negative Negative    Narrative    Testing was performed using the leandro  SARS-CoV-2 & Influenza A/B Assay on the leandro  [employer]  System.  This test should be ordered for the detection of SARS-COV-2 in individuals who meet SARS-CoV-2 clinical and/or epidemiological criteria. Test performance is unknown in asymptomatic patients.  This test is for in vitro diagnostic use under the FDA EUA for laboratories certified under CLIA to perform moderate and/or high complexity testing. This test has not been FDA cleared or approved.  A negative test does not rule out the presence of PCR inhibitors in the specimen or  target RNA in concentration below the limit of detection for the assay. The possibility of a false negative should be considered if the patient's recent exposure or clinical presentation suggests COVID-19.  [facility] are certified under the Clinical Laboratory Improvement Amendments of 1988 (CLIA-88) as qualified to perform moderate and/or high complexity laboratory testing.   Fractional Excretion of Sodium     Status: None    Collection Time: 01/10/22  4:17 PM   Result Value Ref Range    %FENA 1.8 %    Sodium Urine mmol/L 51 mmol/L    Creatinine Urine mg/dL 79 mg/dL       ED MEDICATIONS:   Medications   iohexol (OMNIPAQUE) solution 25 mL (25 mLs Oral Not Given 1/10/22 1619)   diltiazem ER COATED BEADS (CARDIZEM CD/CARTIA XT) 24 hr capsule 240 mg (240 mg Oral Given 1/10/22 1813)   flecainide (TAMBOCOR) tablet 50 mg (50 mg Oral Given 1/10/22 1813)   levothyroxine (SYNTHROID/LEVOTHROID) tablet 88 mcg (has no administration in time range)   pravastatin (PRAVACHOL) tablet 20 mg (20 mg Oral Given 1/10/22 1813)   pantoprazole (PROTONIX) EC tablet 40 mg (has no administration in time range)   HYDROcodone-acetaminophen (NORCO) 5-325 MG per tablet 1 tablet (1 tablet Oral Given 1/10/22 2103)   Warfarin Therapy Reminder (Check START DATE - warfarin may be starting in the FUTURE) (has no administration in time range)   ALPRAZolam (XANAX) tablet 0.25 mg (0.25 mg Oral Given 1/10/22 2343)   cephALEXin (KEFLEX) capsule 500 mg (500 mg Oral Not Given 1/10/22 2055)   lactated ringers infusion (1,000 mLs Intravenous New Bag 1/10/22 2343)   lactated ringers BOLUS 1,000 mL (0 mLs Intravenous Stopped 1/10/22 1511)   warfarin ANTICOAGULANT (COUMADIN) tablet 2.5 mg (2.5 mg Oral Given 1/10/22 1813)         Impression:    ICD-10-CM    1. Acute kidney injury (H)  N17.9    2. Prerenal azotemia  R79.89    3. Abnormal ultrasound of gallbladder  R93.2     consider Hida scan of gallbladder outpatient. follow-up clinic        Plan:    Pending studies include none.  UTI with MARGARET.  BMP in morning and maintenance fluids ordered.  Patient refused her antibiotics due to history of multiple allergies and intolerances of antibiotics in the past.  Urinalysis could represent early UTI although not definitive.  Main issue reportedly is her acute kidney injury.  On maintenance fluids with LR at 125 mL an hour    Advised by RN that patient was having some ongoing pain and difficulty sleeping.  Requesting Lidoderm patches as well as melatonin for sleep.    6:18 AM: Patient was signed out at shift change to MD Jaden Fox, Armando Yadav MD  01/11/22 0618

## 2022-01-11 NOTE — ED NOTES
Patient c/o right upper shoulder/back pain, states it is chronic pain post surgery for lung issues.  PRN norco given for pain, repositioned in bed.  IV LR infusing to gravity.  Patient alert and oriented, using call light appropriately.

## 2022-01-11 NOTE — ED NOTES
Provider Notification    MD notified: Dr. Rose    Time: 1820    Reason: Pt meds needs to be ordered: 81 mg aspirin, 5 mg melatonin, Carvedilol

## 2022-01-11 NOTE — ED NOTES
Patient alert and oriented, c/o moderate to severe pain this evening in left shoulder blade/upper back.  Patient has history of partial left lung removal, has chronic pain d/t surgery.  PRN norco given, encouraging repositioning/heat or ice but patient has refused interventions.  PRN lidocaine orders received, applied to areas of discomfort.  PRN melatonin ordered and given.  Patient repositioned and appears more comfortable now.   is at bedside.    Patient has not voided since yesterday afternoon, bladder scanned for 211ml.  LR infusing per orders.      Patient up with assist of 1 to the commode.  Patient has generalized weakness.

## 2022-01-11 NOTE — ED PROVIDER NOTES
Emergency Department Patient Sign-out       Brief HPI:  This is a 81 year old female signed out to me by Dr. Williamson .  See initial ED Provider note for details of the presentation.       Patient admitted by my self yesterday for observation stay for acute kidney injury increasing her creatinine from baseline of 1.6-3.5.  Suspected prerenal and initiated IV hydration.  Her Fena was 1.8 which is in between for possible other interstitial disease.  [facility] she has an appointment with them on Wednesday.  Her creatinine overnight improved but her potassium has gone up and there is BUN increase as well.  I took the patient over from Dr. Williamson.  She has an increased sense of dyspnea this morning.    Significant Events prior to my assuming care: see my note from yesterday      Exam:   Patient Vitals for the past 24 hrs:   BP Temp Temp src Pulse Resp SpO2   01/11/22 0811 (!) 155/57 97.8  F (36.6  C) Oral 65 18 94 %   01/11/22 0555 (!) 161/70 98.1  F (36.7  C) Oral 66 18 90 %   01/11/22 0330 -- -- -- -- 18 --   01/11/22 0310 (!) 149/60 -- Oral 65 18 92 %   01/10/22 2150 -- -- -- -- 18 92 %   01/10/22 2103 -- -- -- -- 18 --   01/10/22 2025 (!) 131/103 99.4  F (37.4  C) Oral 65 18 96 %   01/10/22 1800 -- -- -- -- -- 97 %   01/10/22 1700 -- -- -- -- -- 96 %   01/10/22 1600 -- -- -- -- -- 95 %   01/10/22 1500 -- -- -- -- -- 96 %   01/10/22 1430 132/49 -- -- -- -- --   01/10/22 1100 -- -- -- -- -- 94 %           ED RESULTS:   Results for orders placed or performed during the hospital encounter of 01/10/22 (from the past 24 hour(s))   INR     Status: Abnormal    Collection Time: 01/11/22  5:32 AM   Result Value Ref Range    INR 2.42 (H) 0.85 - 1.15   Basic metabolic panel     Status: Abnormal    Collection Time: 01/11/22  5:32 AM   Result Value Ref Range    Sodium 127 (L) 133 - 144 mmol/L    Potassium 6.1 (HH) 3.4 - 5.3 mmol/L    Chloride 98 94 - 109 mmol/L     Carbon Dioxide (CO2) 20 20 - 32 mmol/L    Anion Gap 9 3 - 14 mmol/L    Urea Nitrogen 59 (H) 7 - 30 mg/dL    Creatinine 2.92 (H) 0.52 - 1.04 mg/dL    Calcium 8.4 (L) 8.5 - 10.1 mg/dL    Glucose 126 (H) 70 - 99 mg/dL    GFR Estimate 16 (L) >60 mL/min/1.73m2   Extra Tube     Status: None    Collection Time: 01/11/22  5:32 AM    Narrative    The following orders were created for panel order Extra Tube.  Procedure                               Abnormality         Status                     ---------                               -----------         ------                     Extra Purple Top Tube[[id number]]                            Final result                 Please view results for these tests on the individual orders.   Extra Purple Top Tube     Status: None    Collection Time: 01/11/22  5:32 AM   Result Value Ref Range    Hold Specimen JIC    Extra Tube *Canceled*     Status: None ()    Collection Time: 01/11/22  6:21 AM    Narrative    The following orders were created for panel order Extra Tube.  Procedure                               Abnormality         Status                     ---------                               -----------         ------                     Extra Purple Top Tube[[id number]]                                                         Please view results for these tests on the individual orders.   POC US CHEST B-SCAN     Status: Abnormal    Collection Time: 01/11/22  9:16 AM    [facility] Procedure Note      Limited Bedside ED Cardiac Ultrasound:    PROCEDURE: PERFORMED BY: Dr. Julián Fox MD  INDICATIONS/SYMPTOM:  Shortness of Breath  PROBE: Cardiac phased array probe and High frequency linear probe  BODY LOCATION: Chest  FINDINGS:   The ultrasound was performed utilizing the subcostal, parasternal long axis and parasternal short axis views.  Cardiac contractility:  Present  Gross estimation of cardiac kinesis: normal  Pericardial Effusion:  None  RV:LV ratio: LV >  RV  IVC:    Diameter:  IVC diameter expiration (IVCe) 2-3 cm                                                   IVC diameter inspiration (IVCi) 1-2 cm                                                       Collapsibility:  IVC collapses < 50% with inspiration  INTERPRETATION:    Chamber size and motion were grossly normal with LV > RV, normal cardiac kinesis.  No pericardial effusion was found.  IVC visualized and findings indicate ?hypervolemia.  IMAGE DOCUMENTATION: Images were archived to PACs system.      [facility] Procedure Note      Limited Bedside ED Ultrasound of Thorax:    PROCEDURE: PERFORMED BY: Dr. Julián Fox MD  INDICATIONS/SYMPTOM:  shortness of breath  PROBE: High frequency linear probe  BODY LOCATION: Chest  FINDINGS:  Images of both lung hemithoracies taken in 2D in multiple rib spaces        Right side:  Lung sliding artifact  Present     Comet tail artifacts  Absent   Left side:  Lung sliding artifact  Present     Comet tail artifacts  Absent   Pleural effusion: Right side Present      Left side Present    INTERPRETATION: There is evidence of free fluid consistent with a Bilateral pleural effusion.  IMAGE DOCUMENTATION: Images were archived to PACs system.    [facility] Procedure Note      Limited Bedside ED Ultrasound of the Urinary Bladder:    PERFORMED BY: Dr. Julián Fox MD  INDICATIONS:  Possible obstrucion and/or mass  PROBE: Low frequency convex probe  BODY LOCATION:  Abdomen  FINDINGS:  Visualization of the bladder in longitudinal and transverse views demonstrated a distended state.  The bladder dimensions measured: 6 cm width X 6 height cm X 7 cm length.  INTERPRETATION:  Total calculated volume was estimated at 280 cc.  The bladder is abnormally distended.  IMAGE DOCUMENTATION: Images were archived to PACs system.     UA reflex to Microscopic     Status: Abnormal    Collection Time: 01/11/22  9:46 AM   Result Value Ref Range    Color Urine Yellow Colorless, Straw,  Light Yellow, Yellow    Appearance Urine Clear Clear    Glucose Urine Negative Negative mg/dL    Bilirubin Urine Negative Negative    Ketones Urine Negative Negative mg/dL    Specific Gravity Urine 1.012 1.003 - 1.035    Blood Urine Negative Negative    pH Urine 5.0 5.0 - 7.0    Protein Albumin Urine 30  (A) Negative mg/dL    Urobilinogen Urine Normal Normal, 2.0 mg/dL    Nitrite Urine Negative Negative    Leukocyte Esterase Urine Negative Negative    RBC Urine 1 <=2 /HPF    WBC Urine <1 <=5 /HPF   Basic metabolic panel     Status: Abnormal    Collection Time: 01/11/22 12:07 PM   Result Value Ref Range    Sodium 126 (L) 133 - 144 mmol/L    Potassium 5.7 (H) 3.4 - 5.3 mmol/L    Chloride 99 94 - 109 mmol/L    Carbon Dioxide (CO2) 21 20 - 32 mmol/L    Anion Gap 6 3 - 14 mmol/L    Urea Nitrogen 55 (H) 7 - 30 mg/dL    Creatinine 2.63 (H) 0.52 - 1.04 mg/dL    Calcium 8.5 8.5 - 10.1 mg/dL    Glucose 119 (H) 70 - 99 mg/dL    GFR Estimate 18 (L) >60 mL/min/1.73m2   Basic metabolic panel     Status: Abnormal    Collection Time: 01/11/22  4:36 PM   Result Value Ref Range    Sodium 128 (L) 133 - 144 mmol/L    Potassium 5.5 (H) 3.4 - 5.3 mmol/L    Chloride 98 94 - 109 mmol/L    Carbon Dioxide (CO2) 21 20 - 32 mmol/L    Anion Gap 9 3 - 14 mmol/L    Urea Nitrogen 55 (H) 7 - 30 mg/dL    Creatinine 2.57 (H) 0.52 - 1.04 mg/dL    Calcium 8.2 (L) 8.5 - 10.1 mg/dL    Glucose 144 (H) 70 - 99 mg/dL    GFR Estimate 18 (L) >60 mL/min/1.73m2       ED MEDICATIONS:   Medications   iohexol (OMNIPAQUE) solution 25 mL (25 mLs Oral Not Given 1/10/22 1619)   diltiazem ER COATED BEADS (CARDIZEM CD/CARTIA XT) 24 hr capsule 240 mg (240 mg Oral Given 1/10/22 1813)   flecainide (TAMBOCOR) tablet 50 mg (50 mg Oral Given 1/11/22 0822)   levothyroxine (SYNTHROID/LEVOTHROID) tablet 88 mcg (88 mcg Oral Given 1/11/22 0821)   pravastatin (PRAVACHOL) tablet 20 mg (20 mg Oral Given 1/10/22 1813)   pantoprazole (PROTONIX) EC tablet 40 mg (40 mg Oral Given 1/11/22  0821)   HYDROcodone-acetaminophen (NORCO) 5-325 MG per tablet 1 tablet (1 tablet Oral Given 1/11/22 0822)   Warfarin Therapy Reminder (Check START DATE - warfarin may be starting in the FUTURE) (has no administration in time range)   ALPRAZolam (XANAX) tablet 0.25 mg (0.25 mg Oral Given 1/10/22 2343)   cephALEXin (KEFLEX) capsule 500 mg (500 mg Oral Not Given 1/11/22 0817)   lactated ringers infusion ( Intravenous Rate/Dose Verify 1/11/22 0824)   melatonin tablet 5 mg (5 mg Oral Given 1/11/22 0304)   Lidocaine (LIDOCARE) 4 % Patch 3 patch (3 patches Transdermal Patch/Med Applied 1/11/22 0305)     And   lidocaine patch in PLACE (has no administration in time range)   lactated ringers BOLUS 1,000 mL (0 mLs Intravenous Stopped 1/10/22 1511)   warfarin ANTICOAGULANT (COUMADIN) tablet 2.5 mg (2.5 mg Oral Given 1/10/22 1813)   furosemide (LASIX) injection 20 mg (20 mg Intravenous Given 1/11/22 0932)         Impression:    ICD-10-CM    1. Acute kidney injury (H)  N17.9    2. Prerenal azotemia  R79.89    3. Abnormal ultrasound of gallbladder  R93.2     consider Hida scan of gallbladder outpatient. follow-up clinic            EKG Interpretation:      Interpreted by Julián Fox MD  EKG done at 0938 hrs. demonstrates a paced rhythm.  QRS is 206.  QTc is 476.  Rate is 66.  Negative for scarBosa criteria.  This is wider than it was yesterday.  We will recheck EKG again post Lasix and rechecking CHEM panel.           EKG Interpretation:      Interpreted by Julián Fxo MD  EKG done at 1153 hrs. demonstrates a paced rhythm 66 bpm QRS is 198.  No change from the last EKG.  T waves are similar.  Rate is similar.  Negative scar Bosa criteria.      Plan:    Pending studies include none.  Her findings of improved creatinine but increased potassium prompted EKG this morning.  He has increasing shortness of breath tonight held her diuretics last evening but now has increased dyspnea.  Bedside ultrasound is consistent with an IVC  that is generously dilated.  Other findings are reassuring although she does have bilateral pleural effusions.  Plan for Lasix dosing 20 mg IV now.  Will recheck her CHEM panel at noon.  Phone call to nephrology placed.  Her bedside ultrasound also demonstrates urinary retention is increased from yesterday.  She has at least 250 to 300 cc in her bladder.  Recommended Hernández catheter for monitoring of I&O as well as for preventing obstruction.  She is aware of the increased risk for infection.  Her urinalysis yesterday was       Serial labs today were reassuring with a drop in potassium and improvement in creatinine tolerating IV fluids without significant dyspnea now.  Plan for overnight oral and IV hydration and recheck creatinine in the a.m. as well as other CHEM panel components.  Plan also for Lasix IV tomorrow 20 mg.   Discussed with Dr. Peterson who is aware of the patient.  MD Ruddy Pastor Scott J, MD  01/11/22 1957

## 2022-01-11 NOTE — ED NOTES
"Pt states is having more trouble again, RR elevated pt seems more SOB. Pt states she got this way over night last night and \"they gave me vicodin\" and she felt better. Pt states this is how she has felt before and has gotten relief with vicodin. LS reassessed, CTAB, SpO2 96% on RA. Vicodin given. Lab in to redraw.   "

## 2022-01-12 ENCOUNTER — APPOINTMENT (OUTPATIENT)
Dept: CARDIOLOGY | Facility: CLINIC | Age: 82
DRG: 682 | End: 2022-01-12
Attending: FAMILY MEDICINE
Payer: MEDICARE

## 2022-01-12 ENCOUNTER — APPOINTMENT (OUTPATIENT)
Dept: GENERAL RADIOLOGY | Facility: CLINIC | Age: 82
DRG: 682 | End: 2022-01-12
Attending: FAMILY MEDICINE
Payer: MEDICARE

## 2022-01-12 ENCOUNTER — APPOINTMENT (OUTPATIENT)
Dept: GENERAL RADIOLOGY | Facility: CLINIC | Age: 82
DRG: 682 | End: 2022-01-12
Attending: PHYSICIAN ASSISTANT
Payer: MEDICARE

## 2022-01-12 PROBLEM — I35.1 AORTIC REGURGITATION: Status: ACTIVE | Noted: 2022-01-12

## 2022-01-12 PROBLEM — N18.30 CKD (CHRONIC KIDNEY DISEASE) STAGE 3, GFR 30-59 ML/MIN (H): Status: ACTIVE | Noted: 2022-01-12

## 2022-01-12 PROBLEM — K58.1 IRRITABLE BOWEL SYNDROME WITH CONSTIPATION: Status: ACTIVE | Noted: 2022-01-12

## 2022-01-12 PROBLEM — I48.0 PAROXYSMAL ATRIAL FIBRILLATION (H): Status: ACTIVE | Noted: 2022-01-12

## 2022-01-12 PROBLEM — I49.5 SICK SINUS SYNDROME (H): Status: ACTIVE | Noted: 2022-01-12

## 2022-01-12 PROBLEM — M41.9 SCOLIOSIS: Status: ACTIVE | Noted: 2022-01-12

## 2022-01-12 PROBLEM — R93.2 ABNORMAL ULTRASOUND OF GALLBLADDER: Status: ACTIVE | Noted: 2022-01-12

## 2022-01-12 PROBLEM — Z85.118 HISTORY OF LUNG CANCER: Status: ACTIVE | Noted: 2022-01-12

## 2022-01-12 PROBLEM — I73.9 PERIPHERAL VASCULAR DISEASE (H): Status: ACTIVE | Noted: 2022-01-12

## 2022-01-12 PROBLEM — I77.3: Status: ACTIVE | Noted: 2022-01-12

## 2022-01-12 PROBLEM — E03.9 HYPOTHYROIDISM: Status: ACTIVE | Noted: 2022-01-12

## 2022-01-12 PROBLEM — M79.7 FIBROMYALGIA: Status: ACTIVE | Noted: 2022-01-12

## 2022-01-12 PROBLEM — G25.0 ESSENTIAL TREMOR: Status: ACTIVE | Noted: 2022-01-12

## 2022-01-12 PROBLEM — K55.069 OCCLUSION OF SUPERIOR MESENTERIC ARTERY (H): Status: ACTIVE | Noted: 2022-01-12

## 2022-01-12 PROBLEM — M51.369 DDD (DEGENERATIVE DISC DISEASE), LUMBAR: Status: ACTIVE | Noted: 2022-01-12

## 2022-01-12 PROBLEM — K55.1: Status: ACTIVE | Noted: 2022-01-12

## 2022-01-12 PROBLEM — F41.1 GAD (GENERALIZED ANXIETY DISORDER): Status: ACTIVE | Noted: 2022-01-12

## 2022-01-12 PROBLEM — Z95.0 CARDIAC PACEMAKER IN SITU: Status: ACTIVE | Noted: 2022-01-12

## 2022-01-12 PROBLEM — Z86.69 HISTORY OF MIGRAINE: Status: ACTIVE | Noted: 2022-01-12

## 2022-01-12 PROBLEM — K21.9 ESOPHAGEAL REFLUX: Status: ACTIVE | Noted: 2022-01-12

## 2022-01-12 PROBLEM — I74.5: Status: ACTIVE | Noted: 2022-01-12

## 2022-01-12 PROBLEM — R79.89 PRERENAL AZOTEMIA: Status: ACTIVE | Noted: 2022-01-12

## 2022-01-12 PROBLEM — I77.4 CELIAC ARTERY STENOSIS (H): Status: ACTIVE | Noted: 2022-01-12

## 2022-01-12 PROBLEM — I34.0 MITRAL REGURGITATION: Status: ACTIVE | Noted: 2022-01-12

## 2022-01-12 PROBLEM — I1A.0 RESISTANT HYPERTENSION: Status: ACTIVE | Noted: 2022-01-12

## 2022-01-12 PROBLEM — E78.5 HYPERLIPIDEMIA: Status: ACTIVE | Noted: 2022-01-12

## 2022-01-12 PROBLEM — H40.9 GLAUCOMA: Status: ACTIVE | Noted: 2022-01-12

## 2022-01-12 PROBLEM — R33.9 URINARY RETENTION: Status: ACTIVE | Noted: 2022-01-12

## 2022-01-12 PROBLEM — G45.8 SUBCLAVIAN STEAL SYNDROME OF LEFT SUBCLAVIAN ARTERY: Status: ACTIVE | Noted: 2022-01-12

## 2022-01-12 LAB
ANION GAP SERPL CALCULATED.3IONS-SCNC: 10 MMOL/L (ref 3–14)
ANION GAP SERPL CALCULATED.3IONS-SCNC: 10 MMOL/L (ref 3–14)
ANION GAP SERPL CALCULATED.3IONS-SCNC: 9 MMOL/L (ref 3–14)
BASOPHILS # BLD AUTO: 0 10E3/UL (ref 0–0.2)
BASOPHILS NFR BLD AUTO: 0 %
BUN SERPL-MCNC: 48 MG/DL (ref 7–30)
BUN SERPL-MCNC: 49 MG/DL (ref 7–30)
BUN SERPL-MCNC: 51 MG/DL (ref 7–30)
CALCIUM SERPL-MCNC: 8 MG/DL (ref 8.5–10.1)
CALCIUM SERPL-MCNC: 8.1 MG/DL (ref 8.5–10.1)
CALCIUM SERPL-MCNC: 8.4 MG/DL (ref 8.5–10.1)
CHLORIDE BLD-SCNC: 95 MMOL/L (ref 94–109)
CHLORIDE BLD-SCNC: 96 MMOL/L (ref 94–109)
CHLORIDE BLD-SCNC: 99 MMOL/L (ref 94–109)
CO2 SERPL-SCNC: 20 MMOL/L (ref 20–32)
CO2 SERPL-SCNC: 21 MMOL/L (ref 20–32)
CO2 SERPL-SCNC: 22 MMOL/L (ref 20–32)
CREAT SERPL-MCNC: 2.13 MG/DL (ref 0.52–1.04)
CREAT SERPL-MCNC: 2.16 MG/DL (ref 0.52–1.04)
CREAT SERPL-MCNC: 2.2 MG/DL (ref 0.52–1.04)
EOSINOPHIL # BLD AUTO: 0 10E3/UL (ref 0–0.7)
EOSINOPHIL NFR BLD AUTO: 0 %
ERYTHROCYTE [DISTWIDTH] IN BLOOD BY AUTOMATED COUNT: 12.6 % (ref 10–15)
GFR SERPL CREATININE-BSD FRML MDRD: 22 ML/MIN/1.73M2
GFR SERPL CREATININE-BSD FRML MDRD: 22 ML/MIN/1.73M2
GFR SERPL CREATININE-BSD FRML MDRD: 23 ML/MIN/1.73M2
GLUCOSE BLD-MCNC: 119 MG/DL (ref 70–99)
GLUCOSE BLD-MCNC: 127 MG/DL (ref 70–99)
GLUCOSE BLD-MCNC: 127 MG/DL (ref 70–99)
HCT VFR BLD AUTO: 29.3 % (ref 35–47)
HGB BLD-MCNC: 9.7 G/DL (ref 11.7–15.7)
HOLD SPECIMEN: NORMAL
IMM GRANULOCYTES # BLD: 0.1 10E3/UL
IMM GRANULOCYTES NFR BLD: 1 %
INR PPP: 2.26 (ref 0.85–1.15)
LACTATE SERPL-SCNC: 0.9 MMOL/L (ref 0.7–2)
LVEF ECHO: NORMAL
LYMPHOCYTES # BLD AUTO: 1.2 10E3/UL (ref 0.8–5.3)
LYMPHOCYTES NFR BLD AUTO: 10 %
MCH RBC QN AUTO: 33.4 PG (ref 26.5–33)
MCHC RBC AUTO-ENTMCNC: 33.1 G/DL (ref 31.5–36.5)
MCV RBC AUTO: 101 FL (ref 78–100)
MONOCYTES # BLD AUTO: 1.2 10E3/UL (ref 0–1.3)
MONOCYTES NFR BLD AUTO: 10 %
NEUTROPHILS # BLD AUTO: 10 10E3/UL (ref 1.6–8.3)
NEUTROPHILS NFR BLD AUTO: 79 %
NRBC # BLD AUTO: 0 10E3/UL
NRBC BLD AUTO-RTO: 0 /100
NT-PROBNP SERPL-MCNC: 9877 PG/ML (ref 0–1800)
PLATELET # BLD AUTO: 248 10E3/UL (ref 150–450)
POTASSIUM BLD-SCNC: 5.1 MMOL/L (ref 3.4–5.3)
POTASSIUM BLD-SCNC: 5.3 MMOL/L (ref 3.4–5.3)
POTASSIUM BLD-SCNC: 5.3 MMOL/L (ref 3.4–5.3)
RBC # BLD AUTO: 2.9 10E6/UL (ref 3.8–5.2)
SODIUM SERPL-SCNC: 126 MMOL/L (ref 133–144)
SODIUM SERPL-SCNC: 127 MMOL/L (ref 133–144)
SODIUM SERPL-SCNC: 129 MMOL/L (ref 133–144)
WBC # BLD AUTO: 12.5 10E3/UL (ref 4–11)

## 2022-01-12 PROCEDURE — 80048 BASIC METABOLIC PNL TOTAL CA: CPT | Performed by: PHYSICIAN ASSISTANT

## 2022-01-12 PROCEDURE — 71045 X-RAY EXAM CHEST 1 VIEW: CPT

## 2022-01-12 PROCEDURE — 250N000013 HC RX MED GY IP 250 OP 250 PS 637: Performed by: PHYSICIAN ASSISTANT

## 2022-01-12 PROCEDURE — 999N000157 HC STATISTIC RCP TIME EA 10 MIN

## 2022-01-12 PROCEDURE — 80048 BASIC METABOLIC PNL TOTAL CA: CPT | Performed by: EMERGENCY MEDICINE

## 2022-01-12 PROCEDURE — 120N000001 HC R&B MED SURG/OB

## 2022-01-12 PROCEDURE — 250N000013 HC RX MED GY IP 250 OP 250 PS 637: Performed by: FAMILY MEDICINE

## 2022-01-12 PROCEDURE — 250N000013 HC RX MED GY IP 250 OP 250 PS 637

## 2022-01-12 PROCEDURE — 250N000013 HC RX MED GY IP 250 OP 250 PS 637: Performed by: EMERGENCY MEDICINE

## 2022-01-12 PROCEDURE — 94640 AIRWAY INHALATION TREATMENT: CPT | Performed by: FAMILY MEDICINE

## 2022-01-12 PROCEDURE — 99223 1ST HOSP IP/OBS HIGH 75: CPT | Mod: AI | Performed by: PHYSICIAN ASSISTANT

## 2022-01-12 PROCEDURE — 85025 COMPLETE CBC W/AUTO DIFF WBC: CPT | Performed by: PHYSICIAN ASSISTANT

## 2022-01-12 PROCEDURE — 83605 ASSAY OF LACTIC ACID: CPT | Performed by: FAMILY MEDICINE

## 2022-01-12 PROCEDURE — 36415 COLL VENOUS BLD VENIPUNCTURE: CPT | Performed by: EMERGENCY MEDICINE

## 2022-01-12 PROCEDURE — 36415 COLL VENOUS BLD VENIPUNCTURE: CPT | Performed by: PHYSICIAN ASSISTANT

## 2022-01-12 PROCEDURE — 93306 TTE W/DOPPLER COMPLETE: CPT

## 2022-01-12 PROCEDURE — 96376 TX/PRO/DX INJ SAME DRUG ADON: CPT | Performed by: FAMILY MEDICINE

## 2022-01-12 PROCEDURE — 71046 X-RAY EXAM CHEST 2 VIEWS: CPT

## 2022-01-12 PROCEDURE — 250N000011 HC RX IP 250 OP 636: Performed by: FAMILY MEDICINE

## 2022-01-12 PROCEDURE — 36415 COLL VENOUS BLD VENIPUNCTURE: CPT | Performed by: FAMILY MEDICINE

## 2022-01-12 PROCEDURE — 85610 PROTHROMBIN TIME: CPT | Performed by: FAMILY MEDICINE

## 2022-01-12 PROCEDURE — 80048 BASIC METABOLIC PNL TOTAL CA: CPT | Performed by: FAMILY MEDICINE

## 2022-01-12 PROCEDURE — 83880 ASSAY OF NATRIURETIC PEPTIDE: CPT | Performed by: FAMILY MEDICINE

## 2022-01-12 PROCEDURE — 94660 CPAP INITIATION&MGMT: CPT

## 2022-01-12 PROCEDURE — 5A09357 ASSISTANCE WITH RESPIRATORY VENTILATION, LESS THAN 24 CONSECUTIVE HOURS, CONTINUOUS POSITIVE AIRWAY PRESSURE: ICD-10-PCS | Performed by: PHYSICIAN ASSISTANT

## 2022-01-12 PROCEDURE — 250N000011 HC RX IP 250 OP 636: Performed by: EMERGENCY MEDICINE

## 2022-01-12 PROCEDURE — 93306 TTE W/DOPPLER COMPLETE: CPT | Mod: 26 | Performed by: INTERNAL MEDICINE

## 2022-01-12 PROCEDURE — 250N000011 HC RX IP 250 OP 636: Performed by: PHYSICIAN ASSISTANT

## 2022-01-12 PROCEDURE — 250N000009 HC RX 250: Performed by: FAMILY MEDICINE

## 2022-01-12 PROCEDURE — 258N000003 HC RX IP 258 OP 636: Performed by: FAMILY MEDICINE

## 2022-01-12 RX ORDER — FUROSEMIDE 10 MG/ML
40 INJECTION INTRAMUSCULAR; INTRAVENOUS ONCE
Status: COMPLETED | OUTPATIENT
Start: 2022-01-12 | End: 2022-01-12

## 2022-01-12 RX ORDER — ONDANSETRON 2 MG/ML
4 INJECTION INTRAMUSCULAR; INTRAVENOUS EVERY 6 HOURS PRN
Status: DISCONTINUED | OUTPATIENT
Start: 2022-01-12 | End: 2022-01-15 | Stop reason: HOSPADM

## 2022-01-12 RX ORDER — ACETAMINOPHEN 650 MG/1
650 SUPPOSITORY RECTAL EVERY 6 HOURS PRN
Status: DISCONTINUED | OUTPATIENT
Start: 2022-01-12 | End: 2022-01-15 | Stop reason: HOSPADM

## 2022-01-12 RX ORDER — NICARDIPINE HYDROCHLORIDE 0.2 MG/ML
.5-15 INJECTION INTRAVENOUS CONTINUOUS
Status: DISCONTINUED | OUTPATIENT
Start: 2022-01-12 | End: 2022-01-13

## 2022-01-12 RX ORDER — CARVEDILOL 25 MG/1
25 TABLET ORAL
Status: DISCONTINUED | OUTPATIENT
Start: 2022-01-13 | End: 2022-01-15 | Stop reason: HOSPADM

## 2022-01-12 RX ORDER — FUROSEMIDE 10 MG/ML
20 INJECTION INTRAMUSCULAR; INTRAVENOUS ONCE
Status: COMPLETED | OUTPATIENT
Start: 2022-01-12 | End: 2022-01-12

## 2022-01-12 RX ORDER — WARFARIN SODIUM 5 MG/1
5 TABLET ORAL
Status: COMPLETED | OUTPATIENT
Start: 2022-01-12 | End: 2022-01-12

## 2022-01-12 RX ORDER — SPIRONOLACTONE 25 MG
12.5 TABLET ORAL DAILY
Status: DISCONTINUED | OUTPATIENT
Start: 2022-01-12 | End: 2022-01-15 | Stop reason: HOSPADM

## 2022-01-12 RX ORDER — IPRATROPIUM BROMIDE AND ALBUTEROL SULFATE 2.5; .5 MG/3ML; MG/3ML
3 SOLUTION RESPIRATORY (INHALATION) ONCE
Status: COMPLETED | OUTPATIENT
Start: 2022-01-12 | End: 2022-01-12

## 2022-01-12 RX ORDER — CARVEDILOL 12.5 MG/1
12.5 TABLET ORAL 2 TIMES DAILY
Status: DISCONTINUED | OUTPATIENT
Start: 2022-01-12 | End: 2022-01-15 | Stop reason: HOSPADM

## 2022-01-12 RX ORDER — DILTIAZEM HYDROCHLORIDE 240 MG/1
240 CAPSULE, COATED, EXTENDED RELEASE ORAL EVERY EVENING
Status: DISCONTINUED | OUTPATIENT
Start: 2022-01-12 | End: 2022-01-15 | Stop reason: HOSPADM

## 2022-01-12 RX ORDER — ACETAMINOPHEN 325 MG/1
650 TABLET ORAL EVERY 6 HOURS PRN
Status: DISCONTINUED | OUTPATIENT
Start: 2022-01-12 | End: 2022-01-15 | Stop reason: HOSPADM

## 2022-01-12 RX ORDER — LATANOPROST 50 UG/ML
1 SOLUTION/ DROPS OPHTHALMIC AT BEDTIME
Status: DISCONTINUED | OUTPATIENT
Start: 2022-01-12 | End: 2022-01-15 | Stop reason: HOSPADM

## 2022-01-12 RX ORDER — POLYETHYLENE GLYCOL 3350 17 G/17G
17 POWDER, FOR SOLUTION ORAL EVERY EVENING
Status: DISCONTINUED | OUTPATIENT
Start: 2022-01-12 | End: 2022-01-12 | Stop reason: CLARIF

## 2022-01-12 RX ORDER — FUROSEMIDE 10 MG/ML
40 INJECTION INTRAMUSCULAR; INTRAVENOUS EVERY 8 HOURS
Status: DISCONTINUED | OUTPATIENT
Start: 2022-01-13 | End: 2022-01-13

## 2022-01-12 RX ORDER — NITROGLYCERIN 0.4 MG/1
0.4 TABLET SUBLINGUAL EVERY 5 MIN PRN
Status: DISCONTINUED | OUTPATIENT
Start: 2022-01-12 | End: 2022-01-15 | Stop reason: HOSPADM

## 2022-01-12 RX ORDER — NITROGLYCERIN 0.4 MG/1
TABLET SUBLINGUAL
Status: COMPLETED
Start: 2022-01-12 | End: 2022-01-12

## 2022-01-12 RX ORDER — FUROSEMIDE 10 MG/ML
INJECTION INTRAMUSCULAR; INTRAVENOUS
Status: DISCONTINUED
Start: 2022-01-12 | End: 2022-01-13 | Stop reason: HOSPADM

## 2022-01-12 RX ORDER — PROCHLORPERAZINE 25 MG
12.5 SUPPOSITORY, RECTAL RECTAL EVERY 12 HOURS PRN
Status: DISCONTINUED | OUTPATIENT
Start: 2022-01-12 | End: 2022-01-15 | Stop reason: HOSPADM

## 2022-01-12 RX ORDER — PROCHLORPERAZINE MALEATE 5 MG
5 TABLET ORAL EVERY 6 HOURS PRN
Status: DISCONTINUED | OUTPATIENT
Start: 2022-01-12 | End: 2022-01-15 | Stop reason: HOSPADM

## 2022-01-12 RX ORDER — PRAVASTATIN SODIUM 20 MG
20 TABLET ORAL EVERY EVENING
Status: DISCONTINUED | OUTPATIENT
Start: 2022-01-12 | End: 2022-01-15 | Stop reason: HOSPADM

## 2022-01-12 RX ORDER — HYDROXYZINE HYDROCHLORIDE 25 MG/1
25 TABLET, FILM COATED ORAL 3 TIMES DAILY PRN
Status: DISCONTINUED | OUTPATIENT
Start: 2022-01-12 | End: 2022-01-13

## 2022-01-12 RX ORDER — LISINOPRIL 5 MG/1
5 TABLET ORAL EVERY MORNING
Status: DISCONTINUED | OUTPATIENT
Start: 2022-01-13 | End: 2022-01-15

## 2022-01-12 RX ORDER — ONDANSETRON 4 MG/1
4 TABLET, ORALLY DISINTEGRATING ORAL EVERY 6 HOURS PRN
Status: DISCONTINUED | OUTPATIENT
Start: 2022-01-12 | End: 2022-01-15 | Stop reason: HOSPADM

## 2022-01-12 RX ORDER — ASPIRIN 81 MG/1
81 TABLET, CHEWABLE ORAL EVERY EVENING
Status: DISCONTINUED | OUTPATIENT
Start: 2022-01-12 | End: 2022-01-15 | Stop reason: HOSPADM

## 2022-01-12 RX ORDER — FUROSEMIDE 20 MG
20 TABLET ORAL DAILY
Status: DISCONTINUED | OUTPATIENT
Start: 2022-01-12 | End: 2022-01-15

## 2022-01-12 RX ORDER — LIDOCAINE 40 MG/G
CREAM TOPICAL
Status: DISCONTINUED | OUTPATIENT
Start: 2022-01-12 | End: 2022-01-15 | Stop reason: HOSPADM

## 2022-01-12 RX ORDER — ALPRAZOLAM 0.25 MG
0.25 TABLET ORAL 2 TIMES DAILY
Status: DISCONTINUED | OUTPATIENT
Start: 2022-01-12 | End: 2022-01-15 | Stop reason: HOSPADM

## 2022-01-12 RX ORDER — LEVOTHYROXINE SODIUM 88 UG/1
88 TABLET ORAL DAILY
Status: DISCONTINUED | OUTPATIENT
Start: 2022-01-13 | End: 2022-01-15 | Stop reason: HOSPADM

## 2022-01-12 RX ORDER — POLYETHYLENE GLYCOL 3350 17 G/17G
17 POWDER, FOR SOLUTION ORAL EVERY EVENING
Status: DISCONTINUED | OUTPATIENT
Start: 2022-01-12 | End: 2022-01-15 | Stop reason: HOSPADM

## 2022-01-12 RX ORDER — CARBOXYMETHYLCELLULOSE SODIUM 5 MG/ML
1 SOLUTION/ DROPS OPHTHALMIC
Status: DISCONTINUED | OUTPATIENT
Start: 2022-01-12 | End: 2022-01-15 | Stop reason: HOSPADM

## 2022-01-12 RX ORDER — FLECAINIDE ACETATE 50 MG/1
50 TABLET ORAL 2 TIMES DAILY
Status: DISCONTINUED | OUTPATIENT
Start: 2022-01-12 | End: 2022-01-15 | Stop reason: HOSPADM

## 2022-01-12 RX ADMIN — FUROSEMIDE 40 MG: 10 INJECTION, SOLUTION INTRAMUSCULAR; INTRAVENOUS at 21:42

## 2022-01-12 RX ADMIN — ASPIRIN 81 MG CHEWABLE TABLET 81 MG: 81 TABLET CHEWABLE at 17:57

## 2022-01-12 RX ADMIN — ALPRAZOLAM 0.25 MG: 0.25 TABLET ORAL at 20:11

## 2022-01-12 RX ADMIN — FLECAINIDE ACETATE 50 MG: 50 TABLET ORAL at 08:24

## 2022-01-12 RX ADMIN — SODIUM CHLORIDE, POTASSIUM CHLORIDE, SODIUM LACTATE AND CALCIUM CHLORIDE 1000 ML: 600; 310; 30; 20 INJECTION, SOLUTION INTRAVENOUS at 00:07

## 2022-01-12 RX ADMIN — WARFARIN SODIUM 5 MG: 5 TABLET ORAL at 17:44

## 2022-01-12 RX ADMIN — ALPRAZOLAM 0.25 MG: 0.25 TABLET ORAL at 08:41

## 2022-01-12 RX ADMIN — ALPRAZOLAM 0.25 MG: 0.25 TABLET ORAL at 00:08

## 2022-01-12 RX ADMIN — HYDROCODONE BITARTRATE AND ACETAMINOPHEN 1 TABLET: 5; 325 TABLET ORAL at 15:22

## 2022-01-12 RX ADMIN — LEVOTHYROXINE SODIUM 88 MCG: 0.09 TABLET ORAL at 08:24

## 2022-01-12 RX ADMIN — Medication 5 MG: at 00:08

## 2022-01-12 RX ADMIN — FUROSEMIDE 20 MG: 10 INJECTION, SOLUTION INTRAVENOUS at 11:56

## 2022-01-12 RX ADMIN — FUROSEMIDE 40 MG: 10 INJECTION INTRAMUSCULAR; INTRAVENOUS at 22:46

## 2022-01-12 RX ADMIN — DILTIAZEM HYDROCHLORIDE 240 MG: 240 CAPSULE, COATED, EXTENDED RELEASE ORAL at 17:44

## 2022-01-12 RX ADMIN — PSYLLIUM HUSK 1 PACKET: 3.4 POWDER ORAL at 18:25

## 2022-01-12 RX ADMIN — PRAVASTATIN SODIUM 20 MG: 20 TABLET ORAL at 17:44

## 2022-01-12 RX ADMIN — HYDROCODONE BITARTRATE AND ACETAMINOPHEN 1 TABLET: 5; 325 TABLET ORAL at 01:11

## 2022-01-12 RX ADMIN — PANTOPRAZOLE SODIUM 40 MG: 40 TABLET, DELAYED RELEASE ORAL at 08:24

## 2022-01-12 RX ADMIN — FLECAINIDE ACETATE 50 MG: 50 TABLET ORAL at 20:12

## 2022-01-12 RX ADMIN — NITROGLYCERIN 15 MG: 20 OINTMENT TOPICAL at 22:17

## 2022-01-12 RX ADMIN — HYDROCODONE BITARTRATE AND ACETAMINOPHEN 1 TABLET: 5; 325 TABLET ORAL at 21:02

## 2022-01-12 RX ADMIN — IPRATROPIUM BROMIDE AND ALBUTEROL SULFATE 3 ML: .5; 3 SOLUTION RESPIRATORY (INHALATION) at 08:47

## 2022-01-12 RX ADMIN — FUROSEMIDE 20 MG: 10 INJECTION, SOLUTION INTRAVENOUS at 08:29

## 2022-01-12 RX ADMIN — HYDROCODONE BITARTRATE AND ACETAMINOPHEN 1 TABLET: 5; 325 TABLET ORAL at 08:23

## 2022-01-12 RX ADMIN — NITROGLYCERIN: 0.4 TABLET SUBLINGUAL at 22:12

## 2022-01-12 ASSESSMENT — ACTIVITIES OF DAILY LIVING (ADL)
ADLS_ACUITY_SCORE: 8

## 2022-01-12 NOTE — ED NOTES
0800  at desk and quite upset regarding pain medications not given during the night in addition to lab unable to obtain blood sample this morning and needing additional pokes. Patient unwilling to have lab drawn.   0835 Found  trying to place patient on bedpan - patient in disarray in bed and wheezing - respiratory distress. RN boosted patient in bed, placed on oxygen as her sat was 85% on RA - patient visibly upset and taccypnic. Gave patient her norco and her xanax and continue to monitor.   0900 Patient improved somewhat although is still wheezing and additional medications given, neb given, patient again repositioned and seems to be doing markedly better.

## 2022-01-12 NOTE — ED PROVIDER NOTES
Emergency Department Patient Sign-out       Brief HPI:  This is a 81 year old female signed out to me by Dr. Fox at the end of his shift Tuesday evening 1/11/2022.  See initial ED Provider note for details of the presentation.     Significant Events prior to my assuming care: Laboratory evaluation, chest x-ray, Hernández catheter placement, IV fluid boluses and Lasix 20 mg for treatment of acute prerenal renal failure which appears to be slowly improving.  She is boarding in the ED pending bed availability for her admission for continued care.      Exam:   Patient Vitals for the past 24 hrs:   BP Pulse Resp SpO2   01/12/22 1300 (!) 131/95 65 -- 92 %   01/12/22 1230 (!) 153/54 -- -- 96 %   01/12/22 0950 -- -- -- 95 %   01/12/22 0945 -- -- -- 95 %   01/12/22 0900 -- -- -- 93 %   01/12/22 0800 97/68 -- -- --   01/12/22 0000 -- 65 25 --   01/11/22 2000 -- 64 27 --   01/11/22 1945 (!) 140/61 65 21 --   01/11/22 1900 -- 64 25 --   01/11/22 1800 -- 65 16 --   01/11/22 1700 -- 65 (!) 52 95 %   01/11/22 1630 -- 64 24 96 %   01/11/22 1400 125/63 64 22 95 %           ED RESULTS:   Results for orders placed or performed during the hospital encounter of 01/10/22 (from the past 24 hour(s))   Basic metabolic panel     Status: Abnormal    Collection Time: 01/11/22  4:36 PM   Result Value Ref Range    Sodium 128 (L) 133 - 144 mmol/L    Potassium 5.5 (H) 3.4 - 5.3 mmol/L    Chloride 98 94 - 109 mmol/L    Carbon Dioxide (CO2) 21 20 - 32 mmol/L    Anion Gap 9 3 - 14 mmol/L    Urea Nitrogen 55 (H) 7 - 30 mg/dL    Creatinine 2.57 (H) 0.52 - 1.04 mg/dL    Calcium 8.2 (L) 8.5 - 10.1 mg/dL    Glucose 144 (H) 70 - 99 mg/dL    GFR Estimate 18 (L) >60 mL/min/1.73m2   INR     Status: Abnormal    Collection Time: 01/12/22  8:35 AM   Result Value Ref Range    INR 2.26 (H) 0.85 - 1.15   Basic metabolic panel     Status: Abnormal    Collection Time: 01/12/22  8:35 AM   Result Value Ref Range    Sodium 129 (L) 133 - 144 mmol/L    Potassium 5.1  3.4 - 5.3 mmol/L    Chloride 99 94 - 109 mmol/L    Carbon Dioxide (CO2) 20 20 - 32 mmol/L    Anion Gap 10 3 - 14 mmol/L    Urea Nitrogen 51 (H) 7 - 30 mg/dL    Creatinine 2.20 (H) 0.52 - 1.04 mg/dL    Calcium 8.0 (L) 8.5 - 10.1 mg/dL    Glucose 127 (H) 70 - 99 mg/dL    GFR Estimate 22 (L) >60 mL/min/1.73m2   Extra Tube     Status: None    Collection Time: 01/12/22  8:45 AM    Narrative    The following orders were created for panel order Extra Tube.  Procedure                               Abnormality         Status                     ---------                               -----------         ------                     Extra Purple Top Tube[[id number]]                            Final result                 Please view results for these tests on the individual orders.   Extra Purple Top Tube     Status: None    Collection Time: 01/12/22  8:45 AM   Result Value Ref Range    Hold Specimen JIC    NT pro BNP     Status: Abnormal    Collection Time: 01/12/22  1:10 PM   Result Value Ref Range    N terminal Pro BNP Inpatient 9,877 (H) 0-1,800 pg/mL   Basic metabolic panel     Status: Abnormal    Collection Time: 01/12/22  1:10 PM   Result Value Ref Range    Sodium 127 (L) 133 - 144 mmol/L    Potassium 5.3 3.4 - 5.3 mmol/L    Chloride 96 94 - 109 mmol/L    Carbon Dioxide (CO2) 22 20 - 32 mmol/L    Anion Gap 9 3 - 14 mmol/L    Urea Nitrogen 48 (H) 7 - 30 mg/dL    Creatinine 2.16 (H) 0.52 - 1.04 mg/dL    Calcium 8.1 (L) 8.5 - 10.1 mg/dL    Glucose 119 (H) 70 - 99 mg/dL    GFR Estimate 22 (L) >60 mL/min/1.73m2   Chest XR,  PA & LAT     Status: None    Collection Time: 01/12/22  1:36 PM    Narrative    CHEST TWO VIEWS January 12, 2022 1:36 PM     HISTORY: Dyspnea.    COMPARISON: 1/10/2022.       Impression    IMPRESSION: Cardiac silhouette is mildly enlarged, but unchanged.  Interval development of small left pleural effusion. Mild atelectasis  is seen in both lung bases. 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 Cervical  spinal fusion hardware is noted. No significant  bony abnormalities.    DERRICK ALBA MD         SYSTEM ID:  [id number]       ED MEDICATIONS:   Medications   iohexol (OMNIPAQUE) solution 25 mL (25 mLs Oral Not Given 1/10/22 1619)   diltiazem ER COATED BEADS (CARDIZEM CD/CARTIA XT) 24 hr capsule 240 mg (240 mg Oral Given 1/11/22 1745)   flecainide (TAMBOCOR) tablet 50 mg (50 mg Oral Given 1/12/22 0824)   levothyroxine (SYNTHROID/LEVOTHROID) tablet 88 mcg (88 mcg Oral Given 1/12/22 0824)   pravastatin (PRAVACHOL) tablet 20 mg (20 mg Oral Given 1/11/22 1746)   pantoprazole (PROTONIX) EC tablet 40 mg (40 mg Oral Given 1/12/22 0824)   HYDROcodone-acetaminophen (NORCO) 5-325 MG per tablet 1 tablet (1 tablet Oral Given 1/12/22 0823)   Warfarin Therapy Reminder (Check START DATE - warfarin may be starting in the FUTURE) (has no administration in time range)   ALPRAZolam (XANAX) tablet 0.25 mg (0.25 mg Oral Given 1/12/22 0841)   lactated ringers infusion (0 mLs Intravenous Stopped 1/12/22 0025)   melatonin tablet 5 mg (5 mg Oral Given 1/12/22 0008)   Lidocaine (LIDOCARE) 4 % Patch 3 patch (3 patches Transdermal Patch/Med Removed 1/12/22 0008)     And   lidocaine patch in PLACE ( Transdermal Patch in Place 1/12/22 1242)   furosemide (LASIX) injection 20 mg (20 mg Intravenous Given 1/12/22 0829)   lactated ringers BOLUS 1,000 mL (0 mLs Intravenous Stopped 1/10/22 1511)   warfarin ANTICOAGULANT (COUMADIN) tablet 2.5 mg (2.5 mg Oral Given 1/10/22 1813)   furosemide (LASIX) injection 20 mg (20 mg Intravenous Given 1/11/22 0932)   warfarin ANTICOAGULANT (COUMADIN) tablet 1 mg (1 mg Oral Given 1/11/22 1747)   ipratropium - albuterol 0.5 mg/2.5 mg/3 mL (DUONEB) neb solution 3 mL (3 mLs Nebulization Given 1/12/22 1221)   furosemide (LASIX) injection 20 mg (20 mg Intravenous Given 1/12/22 1156)         Impression:    ICD-10-CM    1. Acute kidney injury (H)  N17.9    2. Prerenal azotemia  R79.89    3. Abnormal ultrasound of  gallbladder  R93.2     consider Hida scan of gallbladder outpatient. follow-up clinic       Plan:    Pending studies: AM labs, repeat basic metabolic panel.    3:20 AM - No events during my care of her while she is boarding in the emergency department awaiting admission.  Care the patient signed out to Dr. Stanley at the end of my shift.           Benson Peterson MD  01/12/22 [id number]

## 2022-01-12 NOTE — ED NOTES
Pt resting denies abd pain but has poor appetite. Pt is pushing fluids to have adequate oral intake. Pt spouse at bedside and asking appropriate questions. Plan of care discussed and they are in agreement

## 2022-01-12 NOTE — ED PROVIDER NOTES
Emergency Department Patient Sign-out       Brief HPI:  This is a 81 year old female signed out to me by Dr. Peterson .  See initial ED Provider note for details of the presentation.            Significant Events prior to my assuming care: Labs slowly improving. Hernández placed due to incomplete bladder emptying. Plan for AM labs.      Exam:   Patient Vitals for the past 24 hrs:   BP Temp Temp src Pulse Resp SpO2   01/11/22 2000 -- -- -- 64 27 --   01/11/22 1945 (!) 140/61 -- -- 65 21 --   01/11/22 1900 -- -- -- 64 25 --   01/11/22 1800 -- -- -- 65 16 --   01/11/22 1700 -- -- -- 65 (!) 52 95 %   01/11/22 1630 -- -- -- 64 24 96 %   01/11/22 1400 125/63 -- -- 64 22 95 %   01/11/22 0811 (!) 155/57 97.8  F (36.6  C) Oral 65 18 94 %   01/11/22 0555 (!) 161/70 98.1  F (36.7  C) Oral 66 18 90 %   01/11/22 0330 -- -- -- -- 18 --           ED RESULTS:   Results for orders placed or performed during the hospital encounter of 01/10/22 (from the past 24 hour(s))   INR     Status: Abnormal    Collection Time: 01/11/22  5:32 AM   Result Value Ref Range    INR 2.42 (H) 0.85 - 1.15   Basic metabolic panel     Status: Abnormal    Collection Time: 01/11/22  5:32 AM   Result Value Ref Range    Sodium 127 (L) 133 - 144 mmol/L    Potassium 6.1 (HH) 3.4 - 5.3 mmol/L    Chloride 98 94 - 109 mmol/L    Carbon Dioxide (CO2) 20 20 - 32 mmol/L    Anion Gap 9 3 - 14 mmol/L    Urea Nitrogen 59 (H) 7 - 30 mg/dL    Creatinine 2.92 (H) 0.52 - 1.04 mg/dL    Calcium 8.4 (L) 8.5 - 10.1 mg/dL    Glucose 126 (H) 70 - 99 mg/dL    GFR Estimate 16 (L) >60 mL/min/1.73m2   Extra Tube     Status: None    Collection Time: 01/11/22  5:32 AM    Narrative    The following orders were created for panel order Extra Tube.  Procedure                               Abnormality         Status                     ---------                               -----------         ------                     Extra Purple Top Tube[[id number]]                            Final result                  Please view results for these tests on the individual orders.   Extra Purple Top Tube     Status: None    Collection Time: 01/11/22  5:32 AM   Result Value Ref Range    Hold Specimen JIC    Extra Tube *Canceled*     Status: None ()    Collection Time: 01/11/22  6:21 AM    Narrative    The following orders were created for panel order Extra Tube.  Procedure                               Abnormality         Status                     ---------                               -----------         ------                     Extra Purple Top Tube[[id number]]                                                         Please view results for these tests on the individual orders.   POC US CHEST B-SCAN     Status: Abnormal    Collection Time: 01/11/22  9:16 AM    Impression    [facility] Procedure Note      Limited Bedside ED Cardiac Ultrasound:    PROCEDURE: PERFORMED BY: Dr. Julián Fox MD  INDICATIONS/SYMPTOM:  Shortness of Breath  PROBE: Cardiac phased array probe and High frequency linear probe  BODY LOCATION: Chest  FINDINGS:   The ultrasound was performed utilizing the subcostal, parasternal long axis and parasternal short axis views.  Cardiac contractility:  Present  Gross estimation of cardiac kinesis: normal  Pericardial Effusion:  None  RV:LV ratio: LV > RV  IVC:    Diameter:  IVC diameter expiration (IVCe) 2-3 cm                                                   IVC diameter inspiration (IVCi) 1-2 cm                                                       Collapsibility:  IVC collapses < 50% with inspiration  INTERPRETATION:    Chamber size and motion were grossly normal with LV > RV, normal cardiac kinesis.  No pericardial effusion was found.  IVC visualized and findings indicate ?hypervolemia.  IMAGE DOCUMENTATION: Images were archived to PACs system.      [facility] Procedure Note      Limited Bedside ED Ultrasound of Thorax:    PROCEDURE: PERFORMED BY: Dr. Julián Fox,  MD  INDICATIONS/SYMPTOM:  shortness of breath  PROBE: High frequency linear probe  BODY LOCATION: Chest  FINDINGS:  Images of both lung hemithoracies taken in 2D in multiple rib spaces        Right side:  Lung sliding artifact  Present     Comet tail artifacts  Absent   Left side:  Lung sliding artifact  Present     Comet tail artifacts  Absent   Pleural effusion: Right side Present      Left side Present    INTERPRETATION: There is evidence of free fluid consistent with a Bilateral pleural effusion.  IMAGE DOCUMENTATION: Images were archived to PACs system.    [facility] Procedure Note      Limited Bedside ED Ultrasound of the Urinary Bladder:    PERFORMED BY: Dr. Julián Fox MD  INDICATIONS:  Possible obstrucion and/or mass  PROBE: Low frequency convex probe  BODY LOCATION:  Abdomen  FINDINGS:  Visualization of the bladder in longitudinal and transverse views demonstrated a distended state.  The bladder dimensions measured: 6 cm width X 6 height cm X 7 cm length.  INTERPRETATION:  Total calculated volume was estimated at 280 cc.  The bladder is abnormally distended.  IMAGE DOCUMENTATION: Images were archived to PACs system.     UA reflex to Microscopic     Status: Abnormal    Collection Time: 01/11/22  9:46 AM   Result Value Ref Range    Color Urine Yellow Colorless, Straw, Light Yellow, Yellow    Appearance Urine Clear Clear    Glucose Urine Negative Negative mg/dL    Bilirubin Urine Negative Negative    Ketones Urine Negative Negative mg/dL    Specific Gravity Urine 1.012 1.003 - 1.035    Blood Urine Negative Negative    pH Urine 5.0 5.0 - 7.0    Protein Albumin Urine 30  (A) Negative mg/dL    Urobilinogen Urine Normal Normal, 2.0 mg/dL    Nitrite Urine Negative Negative    Leukocyte Esterase Urine Negative Negative    RBC Urine 1 <=2 /HPF    WBC Urine <1 <=5 /HPF   Basic metabolic panel     Status: Abnormal    Collection Time: 01/11/22 12:07 PM   Result Value Ref Range    Sodium 126 (L) 133 - 144  mmol/L    Potassium 5.7 (H) 3.4 - 5.3 mmol/L    Chloride 99 94 - 109 mmol/L    Carbon Dioxide (CO2) 21 20 - 32 mmol/L    Anion Gap 6 3 - 14 mmol/L    Urea Nitrogen 55 (H) 7 - 30 mg/dL    Creatinine 2.63 (H) 0.52 - 1.04 mg/dL    Calcium 8.5 8.5 - 10.1 mg/dL    Glucose 119 (H) 70 - 99 mg/dL    GFR Estimate 18 (L) >60 mL/min/1.73m2   Basic metabolic panel     Status: Abnormal    Collection Time: 01/11/22  4:36 PM   Result Value Ref Range    Sodium 128 (L) 133 - 144 mmol/L    Potassium 5.5 (H) 3.4 - 5.3 mmol/L    Chloride 98 94 - 109 mmol/L    Carbon Dioxide (CO2) 21 20 - 32 mmol/L    Anion Gap 9 3 - 14 mmol/L    Urea Nitrogen 55 (H) 7 - 30 mg/dL    Creatinine 2.57 (H) 0.52 - 1.04 mg/dL    Calcium 8.2 (L) 8.5 - 10.1 mg/dL    Glucose 144 (H) 70 - 99 mg/dL    GFR Estimate 18 (L) >60 mL/min/1.73m2       ED MEDICATIONS:   Medications   iohexol (OMNIPAQUE) solution 25 mL (25 mLs Oral Not Given 1/10/22 1619)   diltiazem ER COATED BEADS (CARDIZEM CD/CARTIA XT) 24 hr capsule 240 mg (240 mg Oral Given 1/11/22 1745)   flecainide (TAMBOCOR) tablet 50 mg (50 mg Oral Given 1/11/22 1746)   levothyroxine (SYNTHROID/LEVOTHROID) tablet 88 mcg (88 mcg Oral Given 1/11/22 0821)   pravastatin (PRAVACHOL) tablet 20 mg (20 mg Oral Given 1/11/22 1746)   pantoprazole (PROTONIX) EC tablet 40 mg (40 mg Oral Given 1/11/22 0821)   HYDROcodone-acetaminophen (NORCO) 5-325 MG per tablet 1 tablet (1 tablet Oral Given 1/12/22 0111)   Warfarin Therapy Reminder (Check START DATE - warfarin may be starting in the FUTURE) (has no administration in time range)   ALPRAZolam (XANAX) tablet 0.25 mg (0.25 mg Oral Given 1/12/22 0008)   lactated ringers infusion (0 mLs Intravenous Stopped 1/12/22 0025)   melatonin tablet 5 mg (5 mg Oral Given 1/12/22 0008)   Lidocaine (LIDOCARE) 4 % Patch 3 patch (3 patches Transdermal Patch/Med Removed 1/12/22 0008)     And   lidocaine patch in PLACE ( Transdermal Patch Free Period 1/12/22 0007)   furosemide (LASIX) injection 20  mg (has no administration in time range)   lactated ringers BOLUS 1,000 mL (0 mLs Intravenous Stopped 1/10/22 1511)   warfarin ANTICOAGULANT (COUMADIN) tablet 2.5 mg (2.5 mg Oral Given 1/10/22 1813)   furosemide (LASIX) injection 20 mg (20 mg Intravenous Given 1/11/22 0932)   warfarin ANTICOAGULANT (COUMADIN) tablet 1 mg (1 mg Oral Given 1/11/22 1747)         Impression:    ICD-10-CM    1. Acute kidney injury (H)  N17.9    2. Prerenal azotemia  R79.89    3. Abnormal ultrasound of gallbladder  R93.2     consider Hida scan of gallbladder outpatient. follow-up clinic       Plan:    Pending studies include am labs.      6:42 AM: Patient was signed out at shift change to MD Jaden Davison Christopher James, MD  01/12/22 4014

## 2022-01-12 NOTE — ED NOTES
Plan of care discussed with the pt and spouse. Labs reviewed with them and trends. Review meds and current coumadin dose. Pt and family in agreement

## 2022-01-12 NOTE — ED NOTES
Got  a reclining chair so he can spend the night with pt. Pt repositioned in bed and both given warm blankets and pillows. Lights off, resting comfortably.

## 2022-01-12 NOTE — PROGRESS NOTES
"Pt agitated at start of shift. Requested medications and then refused to take them. Appeared to be paranoid regarding staff and agitated. Made several statements questioning what she was being given and not believing the answer.  Called  and upon arrival he requested that Norco be given. Pt would not take it from staff but  able to assist with administration. IV infiltrated and pt refused to let staff place a new PIV or keep administering IV fluids. Hernández is patent with adequate yuki output. Pt refusing vitals and keeps saying \"don't touch me\" when staff attempts routine cares.  is at bedside and patient comfortably resting at present. Will attempt to provide cares when possible.  "

## 2022-01-12 NOTE — H&P
"[facility]    History and Physical - Hospitalist Service       Date of Admission:  1/10/2022    Assessment & Plan      Aditi Palacios is a 81 year old female admitted on 1/10/2022. She presented to the emergency department for evaluation of difficulty urinating and some shortness of breath, was found to have acute on chronic renal failure and later developed CHF exacerbation for which she is being admitted for further evaluation and treatment.    Acute respiratory failure with hypoxia  Was not hypoxic upon presentation, but developed hypoxia on 1/12/22 while still in the emergency department requiring 2L NC. Occurred after receiving 2L LR on 1/10, followed by maintenance IV fluids @ 125 ml/hr for presumed pre-renal azotemia. Chest x-ray with left > right bilateral pleural effusion with \"engorged indistinct central pulmonary vasculature, correlate for interstitial pulmonary edema.\" Developed flash pulmonary edema evening of 1/12/22 shortly after admission as noted below, required initiation of BiPAP. Clinical presentation is most consistent with CHF exacerbation / flash pulmonary edema.   - Patient in process of transferring to ICU on BiPAP  - See below regarding management of HFpEF / flash pulmonary edema    Flash pulmonary edema   (HFpEF) heart failure with preserved ejection fraction   Pulmonary hypertension   Tricuspid regurgitation  Mitral regurgitation  Resistant hypertension  Does not appear to carry a formal diagnosis of CHF prior to admission but does have known aortic insufficiency per Care Everywhere. Echo 1/12/22 with EF 60-65%, grade II diastolic dysfunction, moderate pulmonary hypertension, and valvular abnormalities (moderate mitral regurgitation, mild to moderate tricuspid regurgitation). Managed prior to admission with carvedilol (12.5 mg bid, plus 25 mg mid-day), diltiazem  mg q pm, furosemide 20 mg daily, spironolactone 12.5 mg daily, and lisinopril (5 mg q am, " 30 mg q hs).     She was given IV fluids in the emergency department as noted above and developed acute respiratory distress evening of 1/12 after being admitted to med/surg. Work-up was consistent with flash pulmonary edema. Patient was placed on HFNC followed by BiPAP. She was given 40 mg IV lasix x 2, SL nitroglycerine x 1, and nitropaste with improvement.  - Stop nitropaste and initiate nicardipine ggt once admitted to ICU, goal SBP < 180  - Lasix 40 mg q8h x 3 doses, then reevaluate for additional diuresis  - Continue beta blocker, CCB, spironolactone, and ACEi (oral furosemide on hold)  - Daily weights  - Strict I&Os    Acute on chronic renal failure   CKD (chronic kidney disease) stage 3, GFR 30-59 ml/min  Admit creatinine 3.50 (baseline 1.49 - 11.70), GFR 13 (baseline 28 - 33). Follows with nephrology through [facility]. Occurs in the setting of recent urinary retention (see below). She was initially treated for possible prerenal azotemia and given large volume of IV fluids in the emergency department, but also had a Hernández placed at that time. FeNa = 1.8%. UA unremarkable. Overall presentation favors post-renal etiology at this time, although there is a possible element of pre-renal cause due to some recent diarrhea and ongoing fluid restriction for SIADH.   - Stop IV fluids, initiate diuresis as noted above  - Daily BMP  - Manage urinary retention below    Urinary retention / congenital urethral stenosis  Patient with known history of urethral stenosis, has had urethral dilatations x3 in the past (most recent 2/4/21). Follows with urologist Dr. Alonzo through [facility]. Recent history of decreased urinary output relieved by Hernández catheter at Urgent Care on 1/8/22 (although patient declined being discharged with a Hernández in place, does not feel her urethral stenosis is the cause of her current problem). Had recurrent voiding difficulties so presented to [facility] emergency department  "1/10/22. No hydronephrosis noted on abdominal ultrasound. Suspect she has worsening urethral stenosis causing post-renal acute kidney injury.   - Hernández placed in the emergency department, continue  - Urology consult    Abnormal ultrasound of gallbladder  Noted on CT abdomen & pelvis - \" strandy densities around the gallbladder with indistinct edge.  Acute cholecystitis not excluded \" Abdominal ultrasound - \" abnormal thickening of the gallbladder wall at 0.5 cm.  There is a small amount of pericholecystic fluid.  This can be associated with acute cholecystitis but has a differential diagnosis also including other etiology such as hypoalbuminemia, hepatitis, congestive heart failure as well as others.\" Non-tender right upper quadrant. LFTs within normal limits. Suspect this is related to new CHF findings.  Case discussed between emergency department and on-call general surgery, who recommended the following:  - If right upper quadrant pain or fevers, repeat abdominal ultrasound and labs  - Consider outpatient HIDA scan  - LFTs daily     Recent diarrhea  New onset of loose stool around 1/9/22, estimates about 4 loose stools in past 24 hours. Has malaise,  also not feeling very well. Afebrile, no CT abdomen abnormalities other than gallbladder as noted above and some reactive edema of the bowel wall.   - Monitor, if diarrhea worsens then send an enteric panel    SIADH (syndrome of inappropriate ADH production) (H)  Chronic hyponatremia  Baseline Na between 131 - 133. Is on a fluid restriction due to SIADH. Admit Na = 129, has varied between 126 - 129 thus far. Mildly worse hyponatremia likely due to hypervolemia.  - Trend Na daily, low risk for ODS at current levels    Paroxysmal atrial fibrillation  Chronic anticoagulation   Sick sinus syndrome  Cardiac pacemaker in situ  Follows with cardiology through [facility]. Known history of paroxysmal atrial fibrillation, has a pacemaker. Rate / rhythm controlled " prior to admission with carvedilol (12.5 mg bid and 25 mg at midday), diltiazem  mg q pm, and flecainide 50 mg bid. Anticoagulated with coumadin, admit INR 1.77.   - Continue beta blocker, CCB, and flecainide   - Pharmacy to dose coumadin    Peripheral vascular disease  Occlusion of superior mesenteric artery  Stenosis of inferior mesenteric artery  Fibromuscular dysplasia of right renal artery  Celiac artery stenosis  Subclavian steal syndrome of left subclavian artery  Iliac artery occlusion, bilateral, s/p stents  Hyperlipidemia  Extensive vascular history, s/p iliac stents bilaterally. Follows with Dr. Harding [facility]. Managed prior to admission with aspirin 81 mg daily, pravastatin 20 mg q pm, and coumadin.   - Continue aspirin and statin  - Anticoagulation as above    Hypothyroidism  Managed prior to admission with levothyroxine 88 mcg daily, continue.     LALITA (generalized anxiety disorder)  Anxious mood on admission, likely in part due to respiratory status. Managed prior to admission with Xanax 0.25 mg bid prn and hydroxyzine 25 mg tid prn.   - Continue Xanax and hydroxyzine    Chronic pain  DDD (degenerative disc disease), lumbar  Fibromyalgia  Stable. Managed prior to admission with Norco q6h prn and Lidocaine patches.   - Continue home regimen    Esophageal reflux  Managed prior to admission with omeprazole 20 mg daily, continue.     Irritable bowel syndrome with constipation  Prn bowel regimen available    Essential tremor  Not on any medications currently.     History of lung cancer  S/p left upper lobe lobectomy in 2001. Not an active problem.    Glaucoma  Continue home eye drops.    COVID status  Known COVID infection in October 2020.   Vaccinated with Pfizer x 2 (dates unknown), no booster to date.        Diet: Combination Diet Low Saturated Fat Na <2400mg Diet    DVT Prophylaxis: Warfarin  Hernández Catheter: PRESENT, indication: Strict 1-2 Hour I&O  Central Lines: None  Code  "Status: Full Code  - discussed at time of admission     Disposition Plan   Expected Discharge: 2-3 days   Anticipated discharge location:  Awaiting care coordination huddle       The patient's care was discussed with the Attending Physician, Dr. Gonzalo Anderson as well as the tele-ICU Intensivist, Bedside Nurse, Patient, and Patient's .    Antonia Aly PA-C  [facility]  Securely message with the Vocera Web Console (learn more here)  Text page via Locally Paging/Directory        ______________________________________________________________________    Chief Complaint   \"I couldn't urinate. I haven't felt well since [place].\"    History is obtained from the patient and her , review of EMR, and emergency department documentation.     History of Present Illness   Aditi Palacios is a 81 year old female who presented to the emergency department for evaluation decreased urination, progressive malaise and fatigue.    Patient has a known history of urethral stenosis and has required prior cystoscopies and urethral dilatations through her urologist Dr. Alonzo.  Her most recent dilatation was on 2/4/2021.  Says \"they have not found a permanent fix.\"  Over the past few days she noted some decreased urination.  On 1/8/2022 she presented to the urgency room for evaluation and was diagnosed with urinary retention.  A Hernández catheter was placed and her bladder was drained.  She was given the option to discharge with a Hernández in place, which she declined.  She was advised to follow-up with her urologist in the next few days.  She says \"I know it is not my urethral stenosis,\" so she did not follow-up with her urologist.    The decreased urination persisted after returning home from urgent care.  She started to feel unwell and developed a little bit of diarrhea and poor appetite.  She felt some increased lower abdominal/bladder pressure.  Because of this she presented to the [facility] emergency department on 1/10 for further evaluation and was found to have an acute kidney injury.  A Hernández was placed in the emergency department.  Her acute kidney injury was thought to be due to prerenal azotemia in the setting of diarrhea and ongoing fluid restriction for known SIADH.  She was given IV fluid boluses followed by maintenance IV fluids.  Due to lack of bed availability she boarded in the emergency department from 1/10 through 1/12.    On 1/12 while still in the emergency department she became mildly hypoxic and required 2 L of supplemental oxygen.  She noticed increased work of breathing and was short of breath.  Work-up revealed possible fluid overload and she was given some IV furosemide.  Her renal function was monitored and continued to improve despite the diuresis.    Once a bed is available for admission she was transferred to the [facility] floor.  No additional IV fluids were given at this time, but patient did have some oral fluid intake.  Her that evening she developed acute respiratory distress and was found to be in flash pulmonary edema.  She was given more aggressive IV diuresis as well as sublingual nitroglycerin followed by Nitropaste.  She is placed on high flow nasal cannula followed by transition to BiPAP and transferred down to the ICU.  She seems to have improved after these measures and feels that her breathing is less labored.    Patient denies any associated dysuria or dark concentrated urine.  She developed some bilateral flank pain after exam in emergency department, \"because they were pushing hard.\"    On review of systems she mentions feeling unwell since around [place].  They hosted out-of-town family over the holidays and felt very stressed and fatigued from this, and do not feel that they have recovered.  She says she has been having some recent chills that started around [place], but no fevers.  Has had a poor appetite recently.  Recent diarrhea starting 1/8 " with associated abdominal pain and cramping, now improved.  She had some nausea but did not vomit.  He has some chronic pains which are stable compared to baseline.  She and her  both had COVID in October 2020, feels some fatigue and fogginess since then.  Has felt dizzy and lightheaded the past few days but denies any falls or syncope.  The remainder review of systems is negative.      Review of Systems    The 10 point Review of Systems is negative other than noted in the HPI or here.     Past Medical History    I have reviewed this patient's medical history and updated it with pertinent information if needed.   Past Medical History:   Diagnosis Date     Anxiety      Atrial fibrillation (H)      Cancer (H)     non-small-cell adenocarcinoma of L lung     Fibromyalgia      Gastro-oesophageal reflux disease      Glaucoma      Hypertension      Hypothyroidism      Scoliosis      Vascular complications of mesenteric artery        Past Surgical History   I have reviewed this patient's surgical history and updated it with pertinent information if needed.  Past Surgical History:   Procedure Laterality Date     BACK SURGERY      c2-5 and 5-6 fusion     Billateral common illiac artery stent       BREAST SURGERY       EXTRACAPSULAR CATARACT EXTRATION WITH INTRAOCULAR LENS IMPLANT       GYN SURGERY      total hysterectomy     IMPLANT PACEMAKER      [employer]     IR ABDOMINAL AORTOGRAM  5/3/2005     IR EXTREMITY ANGIOGRAM BILATERAL  5/3/2005     IR MISCELLANEOUS PROCEDURE  5/3/2005     IR MISCELLANEOUS PROCEDURE  5/3/2005     ORTHOPEDIC SURGERY      L ankle     AZ ESOPHAGOGASTRODUODENOSCOPY TRANSORAL DIAGNOSTIC      Description: Esophagogastroduodenoscopy;  Proc Date: 09/26/2007;  Comments: Normal upper endoscopy.     THORACOTOMY  12/18/01    L upper lobe     ZZC CERV SPINE FUSN,ANTER,BELOW C2      Description: Cervical Vertebral Fusion;  Proc Date: 03/11/2008;  Comments: 4 level cervical fusion by [facility]  Dr. Solo.     [facility] TOTAL ABDOM HYSTERECTOMY      Description: Total Abdominal Hysterectomy;  Recorded: 12/18/2009;       Social History   I have reviewed this patient's social history and updated it with pertinent information if needed.  Social History     Tobacco Use     Smoking status: Never Smoker     Smokeless tobacco: Never Used   Substance Use Topics     Alcohol use: Not Currently     Alcohol/week: 0.0 standard drinks     Drug use: Never       Family History   I have reviewed this patient's family history and updated it with pertinent information if needed.  Family History   Problem Relation Age of Onset     Unknown/Adopted Mother      Heart Disease Mother      Hypertension Mother      Diabetes Mother      Unknown/Adopted Father      Hypertension Father      Hyperlipidemia Sister      Hypertension Brother        Prior to Admission Medications   Prior to Admission Medications   Prescriptions Last Dose Informant Patient Reported? Taking?   ALPRAZolam (XANAX) 0.25 MG tablet 1/10/2022 at am Spouse/Significant Other Yes Yes   Sig: Take 1 tablet by mouth 2 times daily   CARVEDILOL 12.5 MG OR TABS 1/10/2022 at am Spouse/Significant Other Yes Yes   Sig: take 1 tablet in am, 2 tablets in afternoon and 1 tablet at bedtime   Cholecalciferol (VITAMIN D3 PO) 1/9/2022 at afternoon Spouse/Significant Other Yes Yes   Sig: Take 2,000 Units by mouth daily    HYDROcodone-acetaminophen (NORCO) 5-325 MG per tablet 1/10/2022 at am Spouse/Significant Other Yes Yes   Sig: Take 1 tablet by mouth every 6 hours as needed for pain    MELATONIN PO 1/9/2022 at hs Spouse/Significant Other Yes Yes   Sig: Take 5 mg by mouth At Bedtime   XALATAN 0.005 % OP SOLN 1/9/2022 at hs Spouse/Significant Other Yes Yes   Sig: Place 1 drop into both eyes At Bedtime    aspirin 81 MG chewable tablet 1/9/2022 at pm Spouse/Significant Other Yes Yes   Sig: Take 81 mg by mouth every evening   brinzolamide-brimonidine (SIMBRINZA) 1-0.2 % ophthalmic suspension  1/9/2022 at pm Spouse/Significant Other Yes Yes   Sig: Place 1 drop Into the left eye 2 times daily    diltiazem ER COATED BEADS (CARDIZEM CD/CARTIA XT) 240 MG 24 hr capsule 1/9/2022 at pm Spouse/Significant Other Yes Yes   Sig: Take 1 capsule by mouth daily   flecainide (TAMBOCOR) 50 MG tablet 1/10/2022 at am Spouse/Significant Other Yes Yes   Sig: Take 50 mg by mouth 2 times daily   fluticasone (FLONASE) 50 MCG/ACT nasal spray More than a month at Unknown time Spouse/Significant Other Yes Yes   Sig: Spray 1 spray into both nostrils daily as needed    furosemide (LASIX) 20 MG tablet 1/10/2022 at am Spouse/Significant Other Yes Yes   Sig: Take 1 tablet by mouth daily   hydrOXYzine (VISTARIL) 25 MG capsule  Spouse/Significant Other Yes No   Sig: #90/30 dys supply   hypromellose (GENTEAL) ophthalmic gel 0.3% 1/9/2022 at hs Spouse/Significant Other Yes Yes   Sig: Place 1-2 drops into both eyes At Bedtime   levothyroxine (SYNTHROID/LEVOTHROID) 88 MCG tablet 1/10/2022 at am Spouse/Significant Other Yes Yes   Sig: Take 1 tablet by mouth daily   lidocaine (LIDODERM) 5 % patch 1/9/2022 at Unknown time Spouse/Significant Other Yes Yes   Sig: Place 1-3 patches onto the skin every 24 hours 12 hours on, 12 hours off   lisinopril (ZESTRIL) 30 MG tablet 1/9/2022 at hs Spouse/Significant Other Yes Yes   Sig: Take 1 tablet by mouth At Bedtime   lisinopril (ZESTRIL) 5 MG tablet 1/10/2022 at am Spouse/Significant Other Yes Yes   Sig: Take 1 tablet by mouth every morning   omeprazole (PRILOSEC) 20 MG DR capsule 1/10/2022 at am Spouse/Significant Other Yes Yes   Sig: Take 1 capsule by mouth daily   polyethylene glycol (MIRALAX/GLYCOLAX) powder 1/8/2022 at pm Spouse/Significant Other Yes Yes   Sig: Take 1 capful by mouth every evening    polyethylene glycol 0.4%- propylene glycol 0.3% (SYSTANE ULTRA) 0.4-0.3 % SOLN ophthalmic solution 1/9/2022 at pm Spouse/Significant Other Yes Yes   Sig: Place 1 drop into both eyes every hour as needed  for dry eyes   pravastatin (PRAVACHOL) 20 MG tablet 2022 at pm Spouse/Significant Other Yes Yes   Sig: Take 1 tablet by mouth every evening    psyllium (METAMUCIL) 58.6 % POWD Past Week at pm Spouse/Significant Other Yes Yes   Sig: Take 3 teaspoonful by mouth every evening = 1 Tablespoons    saccharomyces boulardii (FLORASTOR) 250 MG capsule 2022 at afternoon Spouse/Significant Other Yes Yes   Sig: Take 250 mg by mouth daily (before lunch)   spironolactone (ALDACTONE) 25 MG tablet 1/10/2022 at am Spouse/Significant Other Yes Yes   Sig: Take 12.5 mg by mouth daily   warfarin ANTICOAGULANT (COUMADIN) 2.5 MG tablet 2022 at pm Spouse/Significant Other Yes Yes   Si mg on Wed. 2.5 mg all other days. or as directed by [facility]      Facility-Administered Medications: None     Allergies   Allergies   Allergen Reactions     Amitriptyline Other (See Comments)     Complex migraines with stroke like symptoms     Atenolol Headache     Occular migraine     Codeine Other (See Comments)     Lightheaded and feels strange     Coreg [Carvedilol] Other (See Comments)     CR only - loss of hair     Crestor [Rosuvastatin] Headache     Doxycycline Other (See Comments)     vomiting     Duloxetine Other (See Comments)     Confusion       Fentanyl Other (See Comments)     Ineffective       Gabapentin Other (See Comments)     Confusion       Kenalog [Triamcinolone] Other (See Comments)     Hypertension       Lotrel Other (See Comments)     Stroke like symptoms     Niaspan [Niacin] Other (See Comments)     Flushing - too hot     Other [No Clinical Screening - See Comments]      Fentanyl/Versed combination     Other [Seasonal Allergies]      Beta blockers except for carvedilol     Percocet [Oxycodone-Acetaminophen] Headache     Percodan [Oxycodone-Aspirin] Nausea and Vomiting     Premarin      Tape [Adhesive Tape]      Tramadol Nausea     Vioxx      Zocor [Simvastatin] Other (See Comments)     Stroke like symptoms     Lyrica  [Pregabalin] Anxiety       Physical Exam   Vital Signs: Temp: 97.5  F (36.4  C) Temp src: Axillary BP: (!) 166/51 Pulse: 79   Resp: 20 SpO2: 99 % O2 Device: BiPAP/CPAP Oxygen Delivery: 60 LPM  Weight: 162 lbs 0 oz    Constitutional: Alert, oriented. Anxious but cooperative. Mildly distressed. Ill appearing but nontoxic. Is speaking in full sentences but has 2-3 word dyspnea.    Eyes: Eyes are clear, pupils are reactive. No scleral icterus.    HEENT: Oropharynx is clear and moist, no lesions. Normocephalic, no evidence of cranial trauma.      Cardiovascular: Regular rhythm and rate, normal S1 and S2. No murmur, rubs, or gallops. Peripheral pulses intact bilaterally. No lower extremity edema.    Respiratory: Labored breathing initially on 2L NC (later increased to HFNC with ongoing labored breathing). Coarse lung sounds with upper respiratory wheezing and crackles.     GI: Soft, non-distended. Non-tender, no rebound or guarding. No hepatosplenomegaly or masses appreciated. Normal bowel sounds.     Musculoskeletal: Without obvious deformity. Distal CMS intact.      Skin: Warm and dry, no rashes or ecchymoses. No mottling of skin. Pale appearing.      Neurologic: Patient moves all extremities.  is symmetric. Gross strength and sensation are equal bilaterally.    Genitourinary: Hernández in place draining light yellow urine.      Data   Data reviewed today: I reviewed all medications, new labs and imaging results over the last 24 hours. I personally reviewed the EKG tracing showing paced rhythm and the chest x-ray image(s) showing bilateral increased pulmnary vascularity and evidence of pulmnoary edema. Pacemaker within left chest..    Recent Labs   Lab 01/12/22  2215 01/12/22  1310 01/12/22  0845 01/12/22  0835 01/11/22  1207 01/11/22  0532 01/10/22  1105   WBC  --   --  12.5*  --   --   --   --    HGB  --   --  9.7*  --   --   --   --    MCV  --   --  101*  --   --   --   --    PLT  --   --  248  --   --   --   --     INR  --   --   --  2.26*  --  2.42* 1.77*   * 127*  --  129*   < > 127* 129*   POTASSIUM 5.3 5.3  --  5.1   < > 6.1* 5.6*   CHLORIDE 95 96  --  99   < > 98 99   CO2 21 22  --  20   < > 20 21   BUN 49* 48*  --  51*   < > 59* 61*   CR 2.13* 2.16*  --  2.20*   < > 2.92* 3.50*   ANIONGAP 10 9  --  10   < > 9 9   BETH 8.4* 8.1*  --  8.0*   < > 8.4* 8.8   * 119*  --  127*   < > 126* 116*   ALBUMIN  --   --   --   --   --   --  3.0*   PROTTOTAL  --   --   --   --   --   --  6.4*   BILITOTAL  --   --   --   --   --   --  0.3   ALKPHOS  --   --   --   --   --   --  69   ALT  --   --   --   --   --   --  18   AST  --   --   --   --   --   --  11   LIPASE  --   --   --   --   --   --  85    < > = values in this interval not displayed.     Recent Results (from the past 24 hour(s))   Chest XR,  PA & LAT    Narrative    CHEST TWO VIEWS 2022 1:36 PM     HISTORY: Dyspnea.    COMPARISON: 1/10/2022.       Impression    IMPRESSION: Cardiac silhouette is mildly enlarged, but unchanged.  Interval development of small left pleural effusion. Mild atelectasis  is seen in both lung bases. 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 Cervical spinal fusion hardware is noted. No significant  bony abnormalities.    DERRICK ALBA MD         SYSTEM ID:  [id number]   Echocardiogram Complete   Result Value    LVEF  60-65%    Narrative    [id number]  [id number]  [id number]  [id number]^MADINA^STEVEN^DRAGAN     [facility]  Echocardiography Laboratory  [address] [place] [address]     Name: PATT AGUIRRE  MRN: [id number]  : 1940  Study Date: 2022 01:46 PM  Age: 81 yrs  Gender: Female  Patient Location: [facility]  Reason For Study: CHF  Ordering Physician: STEVEN PAINTER  Referring Physician: Parveen Short  Performed By: Violetta Menchaca RDCS     BSA: 1.8 m2  Height: 64 in  Weight: 162 lb  HR: 63  BP: 128/88  mmHg  ______________________________________________________________________________  Procedure  Complete Portable Echo Adult.  ______________________________________________________________________________  Interpretation Summary     Left ventricular systolic function is normal.  The visual ejection fraction is 60-65%.  Grade II or moderate diastolic dysfunction.  The right ventricle is normal in structure, function and size.  Moderate (46-55mmHg) pulmonary hypertension is present.  There is moderate (2+) mitral regurgitation.  There is mild to moderate (1-2+) tricuspid regurgitation.  The ascending aorta is Mildly dilated.  There is no pericardial effusion.  Dilation of the inferior vena cava is present with abnormal respiratory  variation in diameter.     No prior for comparison.  ______________________________________________________________________________  Left Ventricle  The left ventricle is normal in size. There is mild concentric left  ventricular hypertrophy. Left ventricular systolic function is normal. The  visual ejection fraction is 60-65%. Grade II or moderate diastolic  dysfunction. Normal left ventricular wall motion.     Right Ventricle  The right ventricle is normal in structure, function and size. There is a  pacemaker lead in the right ventricle.     Atria  The left atrium is mildly dilated. The right atrium is mildly dilated.     Mitral Valve  There is mild mitral annular calcification. There is moderate (2+) mitral  regurgitation.     Tricuspid Valve  The tricuspid valve is normal in structure and function. Moderate (46-55mmHg)  pulmonary hypertension is present. The right ventricular systolic pressure is  approximated at 39mmHg plus the right atrial pressure. There is mild to  moderate (1-2+) tricuspid regurgitation.     Aortic Valve  There is mild trileaflet aortic sclerosis. There is mild (1+) aortic  regurgitation.     Pulmonic Valve  The pulmonic valve is normal in structure and  function. There is mild (1+)  pulmonic valvular regurgitation.     Vessels  The aortic root is normal size. The ascending aorta is Mildly dilated.  Dilation of the inferior vena cava is present with abnormal respiratory  variation in diameter.     Pericardium  There is no pericardial effusion.     ______________________________________________________________________________  MMode/2D Measurements & Calculations  IVSd: 1.4 cm  LVIDd: 4.4 cm  LVIDs: 2.5 cm  LVPWd: 1.4 cm  FS: 42.9 %  LV mass(C)d: 242.3 grams  LV mass(C)dI: 135.5 grams/m2     Ao root diam: 3.6 cm  LA dimension: 3.7 cm  asc Aorta Diam: 3.8 cm  LA/Ao: 1.0  LA Volume (BP): 68.0 ml  LA Volume Index (BP): 38.0 ml/m2  RWT: 0.65     Doppler Measurements & Calculations  MV E max reginald: 126.4 cm/sec  MV A max reginald: 81.0 cm/sec  MV E/A: 1.6  MV dec time: 0.13 sec     AI P1/2t: 366.2 msec  MR ERO: 0.14 cm2  MR volume: 20.5 ml  TR max reginald: 312.2 cm/sec  TR max P.0 mmHg  E/E' av.5  Lateral E/e': 12.6  Medial E/e': 20.4     ______________________________________________________________________________  Report approved by: Cherise Rizzo 2022 05:24 PM         XR Chest Port 1 View    Narrative    EXAM: XR CHEST PORT 1 VIEW  LOCATION: [facility]  DATE/TIME: 2022 10:17 PM    INDICATION: Acute respiratory distress? flash pulmonary edema  COMPARISON: 2014      Impression    IMPRESSION: Left subclavian approach pacing device.    Interval development of left greater than right small bilateral pleural effusions with engorged indistinct central pulmonary vasculature, correlate for interstitial pulmonary edema. Borderline enlarged cardiac silhouette with atherosclerotic   calcifications. Cervical fusion hardware.

## 2022-01-12 NOTE — ED PROVIDER NOTES
Emergency Department Patient Sign-out       Brief HPI:  This is a 81 year old female signed out to me by Dr. Logan .  See initial ED Provider note for details of the presentation.     Continued management of what I assume is prerenal azotemia.  She has been dyspneic this morning with increased respiratory rate overnight.  Fluids were held overnight and oxygen was initiated.  Urine has been more concentrated and she is currently on Lasix  20 mg IV.  Labs have gradually improved with creatinine now as of this morning at 2.2.          Significant Events prior to my assuming care: none      Exam:   Patient Vitals for the past 24 hrs:   BP Pulse Resp SpO2   01/12/22 0950 -- -- -- 95 %   01/12/22 0945 -- -- -- 95 %   01/12/22 0900 -- -- -- 93 %   01/12/22 0800 97/68 -- -- --   01/12/22 0000 -- 65 25 --   01/11/22 2000 -- 64 27 --   01/11/22 1945 (!) 140/61 65 21 --   01/11/22 1900 -- 64 25 --   01/11/22 1800 -- 65 16 --   01/11/22 1700 -- 65 (!) 52 95 %   01/11/22 1630 -- 64 24 96 %   01/11/22 1400 125/63 64 22 95 %           ED RESULTS:   Results for orders placed or performed during the hospital encounter of 01/10/22 (from the past 24 hour(s))   Basic metabolic panel     Status: Abnormal    Collection Time: 01/11/22 12:07 PM   Result Value Ref Range    Sodium 126 (L) 133 - 144 mmol/L    Potassium 5.7 (H) 3.4 - 5.3 mmol/L    Chloride 99 94 - 109 mmol/L    Carbon Dioxide (CO2) 21 20 - 32 mmol/L    Anion Gap 6 3 - 14 mmol/L    Urea Nitrogen 55 (H) 7 - 30 mg/dL    Creatinine 2.63 (H) 0.52 - 1.04 mg/dL    Calcium 8.5 8.5 - 10.1 mg/dL    Glucose 119 (H) 70 - 99 mg/dL    GFR Estimate 18 (L) >60 mL/min/1.73m2   Basic metabolic panel     Status: Abnormal    Collection Time: 01/11/22  4:36 PM   Result Value Ref Range    Sodium 128 (L) 133 - 144 mmol/L    Potassium 5.5 (H) 3.4 - 5.3 mmol/L    Chloride 98 94 - 109 mmol/L    Carbon Dioxide (CO2) 21 20 - 32 mmol/L    Anion Gap 9 3 - 14 mmol/L    Urea Nitrogen 55 (H) 7 - 30  mg/dL    Creatinine 2.57 (H) 0.52 - 1.04 mg/dL    Calcium 8.2 (L) 8.5 - 10.1 mg/dL    Glucose 144 (H) 70 - 99 mg/dL    GFR Estimate 18 (L) >60 mL/min/1.73m2   INR     Status: Abnormal    Collection Time: 01/12/22  8:35 AM   Result Value Ref Range    INR 2.26 (H) 0.85 - 1.15   Basic metabolic panel     Status: Abnormal    Collection Time: 01/12/22  8:35 AM   Result Value Ref Range    Sodium 129 (L) 133 - 144 mmol/L    Potassium 5.1 3.4 - 5.3 mmol/L    Chloride 99 94 - 109 mmol/L    Carbon Dioxide (CO2) 20 20 - 32 mmol/L    Anion Gap 10 3 - 14 mmol/L    Urea Nitrogen 51 (H) 7 - 30 mg/dL    Creatinine 2.20 (H) 0.52 - 1.04 mg/dL    Calcium 8.0 (L) 8.5 - 10.1 mg/dL    Glucose 127 (H) 70 - 99 mg/dL    GFR Estimate 22 (L) >60 mL/min/1.73m2   Extra Tube     Status: None    Collection Time: 01/12/22  8:45 AM    Narrative    The following orders were created for panel order Extra Tube.  Procedure                               Abnormality         Status                     ---------                               -----------         ------                     Extra Purple Top Tube[[id number]]                            Final result                 Please view results for these tests on the individual orders.   Extra Purple Top Tube     Status: None    Collection Time: 01/12/22  8:45 AM   Result Value Ref Range    Hold Specimen [facility]        ED MEDICATIONS:   Medications   iohexol (OMNIPAQUE) solution 25 mL (25 mLs Oral Not Given 1/10/22 1619)   diltiazem ER COATED BEADS (CARDIZEM CD/CARTIA XT) 24 hr capsule 240 mg (240 mg Oral Given 1/11/22 1745)   flecainide (TAMBOCOR) tablet 50 mg (50 mg Oral Given 1/12/22 0824)   levothyroxine (SYNTHROID/LEVOTHROID) tablet 88 mcg (88 mcg Oral Given 1/12/22 0824)   pravastatin (PRAVACHOL) tablet 20 mg (20 mg Oral Given 1/11/22 1746)   pantoprazole (PROTONIX) EC tablet 40 mg (40 mg Oral Given 1/12/22 9397)   HYDROcodone-acetaminophen (NORCO) 5-325 MG per tablet 1 tablet (1 tablet Oral Given  1/12/22 0823)   Warfarin Therapy Reminder (Check START DATE - warfarin may be starting in the FUTURE) (has no administration in time range)   ALPRAZolam (XANAX) tablet 0.25 mg (0.25 mg Oral Given 1/12/22 0841)   lactated ringers infusion (0 mLs Intravenous Stopped 1/12/22 0025)   melatonin tablet 5 mg (5 mg Oral Given 1/12/22 0008)   Lidocaine (LIDOCARE) 4 % Patch 3 patch (3 patches Transdermal Patch/Med Removed 1/12/22 0008)     And   lidocaine patch in PLACE ( Transdermal Patch Free Period 1/12/22 0328)   furosemide (LASIX) injection 20 mg (20 mg Intravenous Given 1/12/22 0829)   furosemide (LASIX) injection 20 mg (has no administration in time range)   lactated ringers BOLUS 1,000 mL (0 mLs Intravenous Stopped 1/10/22 1511)   warfarin ANTICOAGULANT (COUMADIN) tablet 2.5 mg (2.5 mg Oral Given 1/10/22 1813)   furosemide (LASIX) injection 20 mg (20 mg Intravenous Given 1/11/22 0932)   warfarin ANTICOAGULANT (COUMADIN) tablet 1 mg (1 mg Oral Given 1/11/22 1747)   ipratropium - albuterol 0.5 mg/2.5 mg/3 mL (DUONEB) neb solution 3 mL (3 mLs Nebulization Given 1/12/22 0847)         Impression:    ICD-10-CM    1. Acute kidney injury (H)  N17.9    2. Prerenal azotemia  R79.89    3. Abnormal ultrasound of gallbladder  R93.2     consider Hida scan of gallbladder outpatient. follow-up clinic       Plan:    Pending studies include none.  She has an acute kidney injury with prerenal azotemia and has been rehydrated to the point of dropping creatinine to 2.2 normalizing her potassium.  However her hydration has worsened congestive heart failure and I suspect she is third spacing into the lung.  We will give additional Lasix.  Systolic blood pressure was more soft this morning.  I had considered giving Lasix last night but the urine was concentrated.  We will have her orally hydrate is much as possible and give IV Lasix.        MD Ruddy Pastor Scott J, MD  01/12/22 7929

## 2022-01-12 NOTE — PROGRESS NOTES
"[facility] [facility] ADMISSION NOTE    Patient admitted to room 2400 at approximately 1445 via cart from emergency room. Patient was accompanied by spouse.     Verbal SBAR report received from Susanna MALLOY prior to patient arrival.     Patient ambulated to bed with stand-by assist. Patient alert and oriented X 3. Pain is controlled with current analgesics.  Medication(s) being used: narcotic analgesics including hydrocodone/acetaminophen (Lorcet, Lortab, Norco, Vicodin).  . Admission vital signs: Blood pressure (!) 178/45, pulse 64, temperature 98.2  F (36.8  C), temperature source Oral, resp. rate 24, height 1.626 m (5' 4\"), weight 73.5 kg (162 lb), SpO2 (!) 89 %. Patient was oriented to plan of care, call light, bed controls, tv, telephone, bathroom and visiting hours.     Risk Assessment    The following safety risks were identified during admission: fall. Yellow risk band applied: YES.     Skin Initial Assessment    This writer admitted this patient and completed a full skin assessment and Stanford score in the Adult PCS flowsheet. Appropriate interventions initiated as needed.     Secondary skin check completed by Paulino MALLOY.    Stanford Risk Assessment  Sensory Perception: 4-->no impairment  Moisture: 3-->occasionally moist  Activity: 2-->chairfast  Mobility: 3-->slightly limited  Nutrition: 3-->adequate  Friction and Shear: 2-->potential problem  Stanford Score: 17  Sensory/Activity/Mobility Interventions: Encourage activity/OOB,Turn: left/right positioning  Moisture Interventions: Encourage regular toileting,Incontinence pad  Friction/Shear Interventions: HOB 30 degrees or less    Education    Patient has a [place] to Observation order: No  Observation education completed and documented: N/A      Audelia Wells RN    "

## 2022-01-13 ENCOUNTER — APPOINTMENT (OUTPATIENT)
Dept: GENERAL RADIOLOGY | Facility: CLINIC | Age: 82
DRG: 682 | End: 2022-01-13
Attending: INTERNAL MEDICINE
Payer: MEDICARE

## 2022-01-13 PROBLEM — I27.20 PULMONARY HYPERTENSION (H): Status: ACTIVE | Noted: 2022-01-13

## 2022-01-13 PROBLEM — M26.609 TMJ (TEMPOROMANDIBULAR JOINT SYNDROME): Status: ACTIVE | Noted: 2022-01-13

## 2022-01-13 PROBLEM — I50.30 (HFPEF) HEART FAILURE WITH PRESERVED EJECTION FRACTION (H): Status: ACTIVE | Noted: 2022-01-13

## 2022-01-13 PROBLEM — Z79.01 CHRONIC ANTICOAGULATION: Status: ACTIVE | Noted: 2022-01-13

## 2022-01-13 PROBLEM — E22.2 SIADH (SYNDROME OF INAPPROPRIATE ADH PRODUCTION) (H): Status: ACTIVE | Noted: 2022-01-13

## 2022-01-13 PROBLEM — J96.01 ACUTE RESPIRATORY FAILURE WITH HYPOXIA (H): Status: ACTIVE | Noted: 2022-01-13

## 2022-01-13 PROBLEM — E87.1 CHRONIC HYPONATREMIA: Status: ACTIVE | Noted: 2022-01-13

## 2022-01-13 PROBLEM — I07.1 TRICUSPID REGURGITATION: Status: ACTIVE | Noted: 2022-01-12

## 2022-01-13 PROBLEM — J81.0 FLASH PULMONARY EDEMA (H): Status: ACTIVE | Noted: 2022-01-13

## 2022-01-13 LAB
ANION GAP SERPL CALCULATED.3IONS-SCNC: 7 MMOL/L (ref 3–14)
BUN SERPL-MCNC: 49 MG/DL (ref 7–30)
CALCIUM SERPL-MCNC: 8.5 MG/DL (ref 8.5–10.1)
CHLORIDE BLD-SCNC: 96 MMOL/L (ref 94–109)
CO2 SERPL-SCNC: 25 MMOL/L (ref 20–32)
CREAT SERPL-MCNC: 2.04 MG/DL (ref 0.52–1.04)
GFR SERPL CREATININE-BSD FRML MDRD: 24 ML/MIN/1.73M2
GLUCOSE BLD-MCNC: 130 MG/DL (ref 70–99)
GLUCOSE BLDC GLUCOMTR-MCNC: 121 MG/DL (ref 70–99)
GLUCOSE BLDC GLUCOMTR-MCNC: 122 MG/DL (ref 70–99)
GLUCOSE BLDC GLUCOMTR-MCNC: 127 MG/DL (ref 70–99)
HOLD SPECIMEN: NORMAL
INR PPP: 2.76 (ref 0.85–1.15)
LACTATE SERPL-SCNC: 0.7 MMOL/L (ref 0.7–2)
MAGNESIUM SERPL-MCNC: 1.9 MG/DL (ref 1.6–2.3)
POTASSIUM BLD-SCNC: 4.6 MMOL/L (ref 3.4–5.3)
SODIUM SERPL-SCNC: 128 MMOL/L (ref 133–144)

## 2022-01-13 PROCEDURE — 83605 ASSAY OF LACTIC ACID: CPT | Performed by: INTERNAL MEDICINE

## 2022-01-13 PROCEDURE — 250N000013 HC RX MED GY IP 250 OP 250 PS 637: Performed by: INTERNAL MEDICINE

## 2022-01-13 PROCEDURE — 120N000004 HC R&B MS OVERFLOW

## 2022-01-13 PROCEDURE — 99233 SBSQ HOSP IP/OBS HIGH 50: CPT | Performed by: INTERNAL MEDICINE

## 2022-01-13 PROCEDURE — 83735 ASSAY OF MAGNESIUM: CPT | Performed by: INTERNAL MEDICINE

## 2022-01-13 PROCEDURE — 999N000123 HC STATISTIC OXYGEN O2DAILY TECH TIME

## 2022-01-13 PROCEDURE — 93005 ELECTROCARDIOGRAM TRACING: CPT

## 2022-01-13 PROCEDURE — 250N000009 HC RX 250: Performed by: INTERNAL MEDICINE

## 2022-01-13 PROCEDURE — 71045 X-RAY EXAM CHEST 1 VIEW: CPT

## 2022-01-13 PROCEDURE — 250N000011 HC RX IP 250 OP 636: Performed by: INTERNAL MEDICINE

## 2022-01-13 PROCEDURE — 85610 PROTHROMBIN TIME: CPT | Performed by: INTERNAL MEDICINE

## 2022-01-13 PROCEDURE — 82374 ASSAY BLOOD CARBON DIOXIDE: CPT | Performed by: INTERNAL MEDICINE

## 2022-01-13 PROCEDURE — 250N000011 HC RX IP 250 OP 636: Performed by: PHYSICIAN ASSISTANT

## 2022-01-13 PROCEDURE — 999N000157 HC STATISTIC RCP TIME EA 10 MIN

## 2022-01-13 PROCEDURE — 250N000013 HC RX MED GY IP 250 OP 250 PS 637: Performed by: PHYSICIAN ASSISTANT

## 2022-01-13 PROCEDURE — 36415 COLL VENOUS BLD VENIPUNCTURE: CPT | Performed by: INTERNAL MEDICINE

## 2022-01-13 RX ORDER — BRINZOLAMIDE 10 MG/ML
1 SUSPENSION/ DROPS OPHTHALMIC 2 TIMES DAILY
Status: DISCONTINUED | OUTPATIENT
Start: 2022-01-13 | End: 2022-01-15 | Stop reason: HOSPADM

## 2022-01-13 RX ORDER — NEOMYCIN SULFATE, POLYMYXIN B SULFATE AND HYDROCORTISONE 10; 3.5; 1 MG/ML; MG/ML; [USP'U]/ML
3 SUSPENSION/ DROPS AURICULAR (OTIC) EVERY 6 HOURS SCHEDULED
Status: DISCONTINUED | OUTPATIENT
Start: 2022-01-13 | End: 2022-01-15 | Stop reason: HOSPADM

## 2022-01-13 RX ORDER — FUROSEMIDE 10 MG/ML
40 INJECTION INTRAMUSCULAR; INTRAVENOUS EVERY 12 HOURS
Status: DISCONTINUED | OUTPATIENT
Start: 2022-01-13 | End: 2022-01-13

## 2022-01-13 RX ORDER — BRIMONIDINE TARTRATE 2 MG/ML
1 SOLUTION/ DROPS OPHTHALMIC 2 TIMES DAILY
Status: ACTIVE | OUTPATIENT
Start: 2022-01-13 | End: 2022-01-13

## 2022-01-13 RX ORDER — DEXTROSE MONOHYDRATE 25 G/50ML
25-50 INJECTION, SOLUTION INTRAVENOUS
Status: DISCONTINUED | OUTPATIENT
Start: 2022-01-13 | End: 2022-01-13

## 2022-01-13 RX ORDER — WARFARIN SODIUM 2.5 MG/1
2.5 TABLET ORAL
Status: COMPLETED | OUTPATIENT
Start: 2022-01-13 | End: 2022-01-13

## 2022-01-13 RX ORDER — FUROSEMIDE 10 MG/ML
40 INJECTION INTRAMUSCULAR; INTRAVENOUS EVERY 8 HOURS
Status: DISCONTINUED | OUTPATIENT
Start: 2022-01-13 | End: 2022-01-14

## 2022-01-13 RX ORDER — NEOMYCIN SULFATE, POLYMYXIN B SULFATE, HYDROCORTISONE 3.5; 10000; 1 MG/ML; [USP'U]/ML; MG/ML
3 SOLUTION/ DROPS AURICULAR (OTIC) EVERY 6 HOURS SCHEDULED
Status: DISCONTINUED | OUTPATIENT
Start: 2022-01-13 | End: 2022-01-13 | Stop reason: CLARIF

## 2022-01-13 RX ORDER — NICOTINE POLACRILEX 4 MG
15-30 LOZENGE BUCCAL
Status: DISCONTINUED | OUTPATIENT
Start: 2022-01-13 | End: 2022-01-13

## 2022-01-13 RX ORDER — HYDROCORTISONE AND ACETIC ACID 20.75; 10.375 MG/ML; MG/ML
2 SOLUTION AURICULAR (OTIC) 3 TIMES DAILY
Status: DISCONTINUED | OUTPATIENT
Start: 2022-01-13 | End: 2022-01-13

## 2022-01-13 RX ADMIN — CARVEDILOL 12.5 MG: 12.5 TABLET, FILM COATED ORAL at 21:45

## 2022-01-13 RX ADMIN — ASPIRIN 81 MG CHEWABLE TABLET 81 MG: 81 TABLET CHEWABLE at 18:29

## 2022-01-13 RX ADMIN — PRAVASTATIN SODIUM 20 MG: 20 TABLET ORAL at 18:30

## 2022-01-13 RX ADMIN — HYDROCODONE BITARTRATE AND ACETAMINOPHEN 1 TABLET: 5; 325 TABLET ORAL at 18:34

## 2022-01-13 RX ADMIN — LISINOPRIL 30 MG: 20 TABLET ORAL at 01:14

## 2022-01-13 RX ADMIN — CARVEDILOL 12.5 MG: 12.5 TABLET, FILM COATED ORAL at 01:14

## 2022-01-13 RX ADMIN — ALPRAZOLAM 0.25 MG: 0.25 TABLET ORAL at 20:23

## 2022-01-13 RX ADMIN — LISINOPRIL 30 MG: 20 TABLET ORAL at 21:45

## 2022-01-13 RX ADMIN — FLECAINIDE ACETATE 50 MG: 50 TABLET ORAL at 20:25

## 2022-01-13 RX ADMIN — WARFARIN SODIUM 2.5 MG: 2.5 TABLET ORAL at 18:28

## 2022-01-13 RX ADMIN — POLYETHYLENE GLYCOL 3350 17 G: 17 POWDER, FOR SOLUTION ORAL at 20:43

## 2022-01-13 RX ADMIN — FUROSEMIDE 40 MG: 10 INJECTION INTRAMUSCULAR; INTRAVENOUS at 08:40

## 2022-01-13 RX ADMIN — LISINOPRIL 5 MG: 5 TABLET ORAL at 08:40

## 2022-01-13 RX ADMIN — CARVEDILOL 25 MG: 25 TABLET, FILM COATED ORAL at 13:14

## 2022-01-13 RX ADMIN — PANTOPRAZOLE SODIUM 40 MG: 40 TABLET, DELAYED RELEASE ORAL at 08:40

## 2022-01-13 RX ADMIN — FUROSEMIDE 40 MG: 10 INJECTION INTRAMUSCULAR; INTRAVENOUS at 18:30

## 2022-01-13 RX ADMIN — DILTIAZEM HYDROCHLORIDE 240 MG: 240 CAPSULE, COATED, EXTENDED RELEASE ORAL at 18:30

## 2022-01-13 RX ADMIN — BRINZOLAMIDE 1 DROP: 10 SUSPENSION/ DROPS OPHTHALMIC at 20:26

## 2022-01-13 RX ADMIN — LEVOTHYROXINE SODIUM 88 MCG: 0.09 TABLET ORAL at 09:31

## 2022-01-13 RX ADMIN — NEOMYCIN SULFATE, POLYMYXIN B SULFATE AND HYDROCORTISONE 3 DROP: 3.5; 10000; 1 SUSPENSION AURICULAR (OTIC) at 18:36

## 2022-01-13 RX ADMIN — ALPRAZOLAM 0.25 MG: 0.25 TABLET ORAL at 08:39

## 2022-01-13 RX ADMIN — LATANOPROST 1 DROP: 50 SOLUTION OPHTHALMIC at 21:45

## 2022-01-13 RX ADMIN — FLECAINIDE ACETATE 50 MG: 50 TABLET ORAL at 08:42

## 2022-01-13 RX ADMIN — Medication 5 MG: at 01:14

## 2022-01-13 RX ADMIN — CARVEDILOL 12.5 MG: 12.5 TABLET, FILM COATED ORAL at 08:40

## 2022-01-13 RX ADMIN — BRINZOLAMIDE 1 DROP: 10 SUSPENSION/ DROPS OPHTHALMIC at 09:32

## 2022-01-13 RX ADMIN — PSYLLIUM HUSK 1 PACKET: 3.4 POWDER ORAL at 20:44

## 2022-01-13 RX ADMIN — LIDOCAINE 3 PATCH: 560 PATCH PERCUTANEOUS; TOPICAL; TRANSDERMAL at 20:23

## 2022-01-13 ASSESSMENT — ACTIVITIES OF DAILY LIVING (ADL)
ADLS_ACUITY_SCORE: 8
DEPENDENT_IADLS:: INDEPENDENT;CLEANING;COOKING;LAUNDRY;SHOPPING;MEAL PREPARATION;MEDICATION MANAGEMENT;MONEY MANAGEMENT
ADLS_ACUITY_SCORE: 8

## 2022-01-13 ASSESSMENT — MIFFLIN-ST. JEOR: SCORE: 1264

## 2022-01-13 NOTE — PROGRESS NOTES
"Received report from [facility] with approval for  to stay as a visitor as needed to settle the patient, who has a history of paranoia and \"violent behaviors\".    "

## 2022-01-13 NOTE — PLAN OF CARE
"Administrative Nurse Supervisor note: Pt being transferred to ICU due to increased work of breathing and need to go from high flow O2 to Bipap as well as a nicardipine gtt. Pt \"sundowns\" and is ramping up due to the increased stimulation of the move and the , who is POA but not activated, asks that he be aloud to stay long enough to calm the pt down so she doesn't become violent. Pt has a hx of violent outbursts. Obtained 2nd administrative approval from Nahomy GUSTAFSON to allow pt's  to stay long enough to calm pt after the move to ICU for pt and staff safety.  was happy about it as he wants to be able to go home and get some rest.  "

## 2022-01-13 NOTE — PROGRESS NOTES
Pt arrived to ICU around 0100 on Bipap. Once settled VSS, did not start Nicardipine drip. Oxygen switched to Oxymask with stable sats. Hernández in place with adequate output. Pt became confused/angry at 0500 when lab woke her up for bloodwork. RN tried to draw blood from PIV but pt would not allow, gave pt personal space and will try again later.

## 2022-01-13 NOTE — PROGRESS NOTES
Writer has attempted pacemaker interrogation x3 on 3 different [employer] devices at facility. Transmission does not appear to go through. Spoke with Solomon gonzalez for troubleshooting assistance. Awaiting call back at this time

## 2022-01-13 NOTE — PROGRESS NOTES
Virtual opinion:  01/12/22    I was called by Antonia Aly from the Department of Medicine at [facility] about the patient (Aditi Palacios, [id number] 1/10/2022) who I'm told has been admitted for MARGARET and boarding in the ED. Pt now with worsening pulmonary edema being transferred to ICU.    I called with the following specific question and concerns;  - hypertension  - BiPAP  - diuresis    My opinion as follows;  - pt had been receiving 20-40mg furosemide, given pt GFR, likely will need increased dose  - if 40mg dose does not provide adequate diuresis, trial 80mg furosemide  - continue BiPAP  - stop nitroglycerin paste  - start nicardipine gtt, SBP < 180    At the time of our discussion over the phone I was unable to see and personally evaluate the patient.  I was able to access to the patient s medical records/chart and reviewed the chart related to the specific question.     I made Antonia Aly aware that I cannot provide direct medical advice or consultation, but could provide a general opinion for his/her consideration as the in-person treating provider.      My conversation with Antonia Aly is not meant to replace or supersede the clinical judgement of the in-person treating provider.    Valerio Tomlinson MD  Pulmonary Disease and Critical Care Medicine  [facility]

## 2022-01-13 NOTE — CONSULTS
Care Management Initial Consult    General Information  Assessment completed with: SpouseValerio  Type of CM/SW Visit: Offer D/C Planning    Primary Care Provider verified and updated as needed: Yes   Readmission within the last 30 days: no previous admission in last 30 days         Advance Care Planning: Advance Care Planning Reviewed: no concerns identified          Communication Assessment  Patient's communication style: spoken language (English or Bilingual)    Hearing Difficulty or Deaf: no   Wear Glasses or Blind: yes    Cognitive  Cognitive/Neuro/Behavioral: .WDL except  Level of Consciousness: confused  Arousal Level: opens eyes spontaneously  Orientation: disoriented to,situation,place  Mood/Behavior: calm,cooperative  Best Language: 0 - No aphasia  Speech: clear    Living Environment:   People in home: alone     Current living Arrangements: apartment      Able to return to prior arrangements: no       Family/Social Support:  Care provided by: self  Provides care for: no one     Children          Description of Support System: Supportive,Involved    Support Assessment: Adequate family and caregiver support,Lacks necessary supervision and assistance    Current Resources:   Patient receiving home care services: No  Community Resources: None  Equipment currently used at home: wheelchair, power  Supplies currently used at home: Diabetic Supplies    Employment/Financial:  Employment Status:          Financial Concerns: No concerns identified        Functional Status:  Prior to admission patient needed assistance:   Dependent ADLs:: Independent,Ambulation-no assistive device  Dependent IADLs:: Independent,Cleaning,Cooking,Laundry,Shopping,Meal Preparation,Medication Management,Money Management       Mental Health Status:  Mental Health Status: No Current Concerns       Chemical Dependency Status:  Chemical Dependency Status: No Current Concerns             Values/Beliefs:  Spiritual, Cultural Beliefs, Mandaen  Practices, Values that affect care: no               Additional Information:    Met with the Patient's  Valerio for discharge planning as the Patient was asleep.    The Patient lives with her  Valerio in the community in a home.  Valerio assists with bathing and medication management.  She ambulates independently at times, uses a walker or wheelchair at times.  The Pt does not drive.  She has 4 children that are supportive.    Discussed home care.  Referral placed for [facility] (Phone: [phone]) RN, PT and HHA.    The Patient will schedule a post hospital follow up appointment upon discharge.    Plan:  Home with [facility].          Ivette Dougherty RN

## 2022-01-13 NOTE — SIGNIFICANT EVENT
"Pt agitated once awake this morning &  here - thinks staff is trying to kill her.  Asked to use commode for bowel movement b/c \"I'm constipated & I haven't pooped since I've been here.\"  Informed pt she had a large b.m last night but pt did not believe me.  Asked  for the \"blue tube\" which was KY-jelly lube - put it on her finger & stuck it into her rectum to dig herself out.  Pt not redirectable at this point.  Did take morning meds.  Will see if can reapproach later for lab draw once alprazolam kicks in.  "

## 2022-01-13 NOTE — PLAN OF CARE
Patient audibly wheezing, RR 32, labored, using abdominal muscles, SpO2 mid 80s. Placed on 15 lpm O2 via oxymask with no improvement. Charge nurse, RT, and provider notified.

## 2022-01-13 NOTE — PROGRESS NOTES
"[facility] Medicine Progress Note  Date of Service: 01/13/2022     Assessment & Plan      Aditi Palacios is a 81 year old female admitted on 1/10/2022. She presented to the emergency department for evaluation of difficulty urinating and some shortness of breath, was found to have acute on chronic renal failure and later developed CHF exacerbation for which she is being admitted for further evaluation and treatment.    Acute respiratory failure with hypoxia    Was not hypoxic upon presentation, but developed hypoxia on 1/12/22 while still in the emergency department requiring 2L NC. Occurred after receiving 2L LR on 1/10, followed by maintenance IV fluids @ 125 ml/hr for presumed pre-renal azotemia. Chest x-ray with left > right bilateral pleural effusion with \"engorged indistinct central pulmonary vasculature, correlate for interstitial pulmonary edema.\" Developed flash pulmonary edema evening of 1/12/22 shortly after admission as noted below, required initiation of BiPAP. Clinical presentation is most consistent with CHF exacerbation / flash pulmonary edema.     Improved with diuresis, now on 2-3 lpm NC.    - treat underlying issues as below   - stable for med/surg    Flash pulmonary edema   (HFpEF) heart failure with preserved ejection fraction   Pulmonary hypertension   Tricuspid regurgitation  Mitral regurgitation  Resistant hypertension    Does not appear to carry a formal diagnosis of CHF prior to admission but does have known aortic insufficiency per Care Everywhere. Echo 1/12/22 with EF 60-65%, grade II diastolic dysfunction, moderate pulmonary hypertension, and valvular abnormalities (moderate mitral regurgitation, mild to moderate tricuspid regurgitation). Managed prior to admission with carvedilol (12.5 mg bid, plus 25 mg mid-day), diltiazem  mg q pm, furosemide 20 mg daily, spironolactone 12.5 mg daily, and lisinopril (5 mg q am, 30 mg q hs).     She was " given IV fluids in the emergency department as noted above and developed acute respiratory distress evening of 1/12 after being admitted to med/surg. Work-up was consistent with flash pulmonary edema. Patient was placed on HFNC followed by BiPAP. She was given 40 mg IV lasix x 2, SL nitroglycerine x 1, and nitropaste with improvement. Transferred to ICU for BiPAP and nicardipine infusion. Nicardipine was not started/needed. Weaned off BiPAP overnight.   - continue furosemide 40 mg q8h IV another day  - Continue beta blocker, CCB, spironolactone, and ACEi (oral furosemide on hold)  - Daily weights  - Strict I&Os    Paroxysmal atrial fibrillation  Chronic anticoagulation   Sick sinus syndrome  Cardiac pacemaker in situ    Follows with cardiology through [facility]. Known history of paroxysmal atrial fibrillation, has a pacemaker. Rate / rhythm controlled prior to admission with carvedilol (12.5 mg bid and 25 mg at midday), diltiazem  mg q pm, and flecainide 50 mg bid. Anticoagulated with coumadin, admit INR 1.77.      reports that pacemaker very close to end of life.  and RN reported occasional HR 50's seen on telemetry. Pacemaker interrogation today, 1/13/2022, suggested the PM was needing to be replaced. ECG obtained and pacer spikes clearly seen in lead V2 at rate about 64, and there was occasional premature complex that was low amplitude and followed by compensatory pause. I suspect this is what leads to an occasional lower rate on telemetry. Patient does not appear to have had AVN ablation.    - appears stable tonight and will review with cardiology tomorrow  - Continue beta blocker, CCB, and flecainide   - Pharmacy to dose coumadin    Acute on chronic renal failure   CKD (chronic kidney disease) stage 3, GFR 30-59 ml/min    Admit 1/10/2022 creatinine 3.50 (baseline 1.49 - 1.70), GFR 13 (baseline 28 - 33). Creatinine was baseline 1.61 on 1/5/2022 on labs drawn [facility]       She was initially treated for possible prerenal azotemia and given large volume of IV fluids in the emergency department, but also had a Hernández placed at that time. FeNa = 1.8%. UA unremarkable.  Creatinine has trended down both with IV fluids given on admission and also with diuresis subsequently.  Creatinine 2.1 today.    Suspect patient may have been in heart failure and acute kidney injury may be due to cardiorenal physiology.  -Continue to follow with further diuresis  - Daily BMP    Urinary retention / congenital urethral stenosis    Patient with known history of urethral stenosis, has had urethral dilatations x3 in the past (most recent 2/4/21). Follows with urologist Dr. Alonzo through [facility]. Patient noticed she could not urinate 1/8/2022 and was seen in [facility]. No labs then and had bladder drained via catheter of only about 200 mL light yellow urine. Patient does not feel her urethral stenosis is the cause of her current problem. Had recurrent voiding difficulties so presented to [facility] emergency department 1/10/22. No hydronephrosis noted on abdominal ultrasound. Bladder scan in ED early 1/11/2022 showed 211 mL urine.  Hernández catheter placed in ED.    -Continue Hernández catheter   - Does not need inpatient urology consultation at this time    Abnormal ultrasound of gallbladder    Noted on CT abdomen & pelvis - \" strandy densities around the gallbladder with indistinct edge.  Acute cholecystitis not excluded \" Abdominal ultrasound - \" abnormal thickening of the gallbladder wall at 0.5 cm.  There is a small amount of pericholecystic fluid.  This can be associated with acute cholecystitis but has a differential diagnosis also including other etiology such as hypoalbuminemia, hepatitis, congestive heart failure as well as others.\" Non-tender right upper quadrant. LFTs within normal limits. Suspect this is related to new CHF findings and was present " on day of admission 1/10/2022.   - recheck LFTs once and if normal, would pursue additional work-up only if develops symptoms consistent with cholecystitis    Recent diarrhea    New onset of loose stool around 1/9/22, estimates about 4 loose stools in past 24 hours. Has malaise,  also not feeling very well. Afebrile, no CT abdomen abnormalities other than gallbladder as noted above and some reactive edema of the bowel wall.   - Monitor, if diarrhea worsens then send an enteric panel    SIADH (syndrome of inappropriate ADH production) (H)  Chronic hyponatremia    Baseline Na between 131 - 133. Is on a fluid restriction due to SIADH. Admit Na = 129, has varied between 126 - 129 thus far. Mildly worse hyponatremia likely due to hypervolemia.   - follow sodium   - fluid restriction as at home 1500 mL    Right ear pain, possible mild otitis externa  Right ear pain for a few days.  She believes this is an ear infection.  Does not believe she is prone to ear infections.  There is wax in the ear and I could see just a slight bit of the tympanic membrane which does appear a little bulged but not red.  There may be some mild otitis externa.  Patient would like her ears cleaned however does not usually done in the inpatient environment.   - Trial poly-/carrie-/hydrocortisone drops right ear    Peripheral vascular disease  Occlusion of superior mesenteric artery  Stenosis of inferior mesenteric artery  Fibromuscular dysplasia of right renal artery  Celiac artery stenosis  Subclavian steal syndrome of left subclavian artery  Iliac artery occlusion, bilateral, s/p stents  Hyperlipidemia  Extensive vascular history, s/p iliac stents bilaterally. Follows with Dr. Harding through [facility]. Managed prior to admission with aspirin 81 mg daily, pravastatin 20 mg q pm, and coumadin.   - Continue aspirin and statin  - Anticoagulation as above    Hypothyroidism  Managed prior to admission with levothyroxine 88 mcg daily,  continue.     LALITA (generalized anxiety disorder)  Anxious mood on admission, likely in part due to respiratory status. Managed prior to admission with Xanax 0.25 mg bid prn and hydroxyzine 25 mg tid prn. Patient did not like hydroxyzine due to severe dry mouth; it can also exacerbate urinary retention  - Continue Xanax    - Discontinue hydroxyzine    Chronic pain  DDD (degenerative disc disease), lumbar  Fibromyalgia  Stable. Managed prior to admission with Norco q6h prn and Lidocaine patches.   - Continue home regimen    Esophageal reflux  Managed prior to admission with omeprazole 20 mg daily, continue.     Irritable bowel syndrome with constipation  Prn bowel regimen available    Essential tremor  Not on any medications currently.     History of lung cancer  S/p left upper lobe lobectomy in 2001. Not an active problem.    Glaucoma  Continue home eye drops.    COVID status  Known COVID infection in October 2020.   Vaccinated with Pfizer x 2 (dates unknown), no booster to date.        Diet: Combination Diet Low Saturated Fat Na <2400mg Diet    DVT Prophylaxis: Warfarin  Hernández Catheter: PRESENT, indication: Strict 1-2 Hour I&O  Central Lines: None  Code Status: Full Code  - discussed at time of admission       Discussion: I believe the patient's principal problem is acute heart failure with some cardiorenal physiology causing acute kidney injury.  Because of her acute heart failure is not yet clear.  Would be concerned for pacemaker dysfunction but appears to be pacing adequately now.  Patient appears stable for MedSurg status.  We will continue diuresis.    Disposition Plan   Expected Discharge: 2 to 4 days pending improvement in respiratory status and kidney function    Attestation:  Total time: 55 minutes    Jc Ko MD  [facility] Medicine        Interval History   HPI thoroughly reviewed again with  and patient at bedside.  New history includes recently being tried with Vistaril for anxiety last week.   She took five doses but has since stopped due to severe dry mouth.  No other new medications or other medication changes.  Presently denies being short of breath.  No chest pain.  Complains of ear pain on the right.  She believes this is an ear infection.  Pain radiates around the ear and to the right cheek and jaw.    Physical Exam   Temp:  [97.4  F (36.3  C)-98.8  F (37.1  C)] 97.4  F (36.3  C)  Pulse:  [61-79] 62  Resp:  [17-36] 30  BP: (100-192)/() 123/95  FiO2 (%):  [55 %-93 %] 55 %  SpO2:  [88 %-99 %] 96 %    Weights:   Vitals:    01/10/22 0823 01/13/22 0400   Weight: 73.5 kg (162 lb) 81.4 kg (179 lb 7.3 oz)    Body mass index is 30.8 kg/m .    Constitutional: Alert and oriented x4, some abdominal breathing otherwise no apparent distress.  Appears nontoxic.  HEENT: Otoscopic examination of the right ears shows moderate amount of wax and can see just a little past that to the edge of the tympanic membrane which appears a little bulging great but not red.  No obvious purulence.  Left ear canal and tympanic membrane are normal.  Skin around the ear is not red.  Complains of some tenderness on palpation around the ear however.  CV: Regular, with occasional ectopy.  No lower extremity edema.  Jugular venous pressure is difficult to ascertain due to body habitus.  Respiratory: Mild bibasilar crackles otherwise CTA bilaterally  GI: Soft, non-tender, bowel sounds normal  Skin: Warm and dry    Data   Recent Labs   Lab 01/13/22  1642 01/13/22  1505 01/13/22  1453 01/13/22  1116 01/13/22  0841 01/12/22  2215 01/12/22  1310 01/12/22  0845 01/12/22  0835 01/11/22  1207 01/11/22  0532 01/10/22  1105   WBC  --   --   --   --   --   --   --  12.5*  --   --   --   --    HGB  --   --   --   --   --   --   --  9.7*  --   --   --   --    MCV  --   --   --   --   --   --   --  101*  --   --   --   --    PLT  --   --   --   --   --   --   --  248  --   --   --   --    INR  --   --  2.76*  --   --   --   --   --  2.26*  --   2.42* 1.77*   NA  --  128*  --   --   --  126* 127*  --  129*   < > 127* 129*   POTASSIUM  --  4.6  --   --   --  5.3 5.3  --  5.1   < > 6.1* 5.6*   CHLORIDE  --  96  --   --   --  95 96  --  99   < > 98 99   CO2  --  25  --   --   --  21 22  --  20   < > 20 21   BUN  --  49*  --   --   --  49* 48*  --  51*   < > 59* 61*   CR  --  2.04*  --   --   --  2.13* 2.16*  --  2.20*   < > 2.92* 3.50*   ANIONGAP  --  7  --   --   --  10 9  --  10   < > 9 9   BETH  --  8.5  --   --   --  8.4* 8.1*  --  8.0*   < > 8.4* 8.8   * 130*  --  122*   < > 127* 119*  --  127*   < > 126* 116*   ALBUMIN  --   --   --   --   --   --   --   --   --   --   --  3.0*   PROTTOTAL  --   --   --   --   --   --   --   --   --   --   --  6.4*   BILITOTAL  --   --   --   --   --   --   --   --   --   --   --  0.3   ALKPHOS  --   --   --   --   --   --   --   --   --   --   --  69   ALT  --   --   --   --   --   --   --   --   --   --   --  18   AST  --   --   --   --   --   --   --   --   --   --   --  11   LIPASE  --   --   --   --   --   --   --   --   --   --   --  85    < > = values in this interval not displayed.       Recent Labs   Lab 01/13/22  1642 01/13/22  1505 01/13/22  1116 01/13/22  0841 01/12/22  2215 01/12/22  1310   * 130* 122* 121* 127* 119*        Unresulted Labs Ordered in the Past 30 Days of this Admission     No orders found from 12/11/2021 to 1/11/2022.           Imaging:   Recent Results (from the past 24 hour(s))   XR Chest Port 1 View    Narrative    EXAM: XR CHEST PORT 1 VIEW  LOCATION: [facility]  DATE/TIME: 1/12/2022 10:17 PM    INDICATION: Acute respiratory distress? flash pulmonary edema  COMPARISON: 02/24/2014      Impression    IMPRESSION: Left subclavian approach pacing device.    Interval development of left greater than right small bilateral pleural effusions with engorged indistinct central pulmonary vasculature, correlate for interstitial pulmonary edema. Borderline  enlarged cardiac silhouette with atherosclerotic   calcifications. Cervical fusion hardware.   XR Chest Port 1 View    Narrative    EXAM: XR CHEST PORT 1 VIEW  LOCATION: [facility]  DATE/TIME: 1/13/2022 6:44 PM    INDICATION: Increased respiratory distress  COMPARISON: 01/12/2022 at 2217      Impression    IMPRESSION: Small bilateral pleural effusions similar to prior. Mild increased vascularity unchanged. Mild bibasilar atelectatic changes similar to prior. No pneumothorax.        I reviewed all new labs and imaging results over the last 24 hours. I personally reviewed the chest x-ray image(s) showing Small effusions and some mild vascular congestion.  EKG reviewed and shows paced rhythm about 64 with occasional premature ventricular complex of small amplitude.  Pacer spikes are clearly visible on the V2 lead.    Medications     - MEDICATION INSTRUCTIONS -       Warfarin Therapy Reminder         ALPRAZolam  0.25 mg Oral BID     aspirin  81 mg Oral QPM     brinzolamide  1 drop Left Eye BID     carvedilol  12.5 mg Oral BID     carvedilol  25 mg Oral Daily with lunch     diltiazem ER COATED BEADS  240 mg Oral QPM     flecainide  50 mg Oral BID     furosemide  40 mg Intravenous Q8H     [Held by provider] furosemide  20 mg Oral Daily     latanoprost  1 drop Both Eyes At Bedtime     levothyroxine  88 mcg Oral Daily     lidocaine  3 patch Transdermal Q24h    And     lidocaine   Transdermal Q8H     lisinopril  30 mg Oral At Bedtime     lisinopril  5 mg Oral QAM     neomycin-polymyxin-hydrocortisone  3 drop Right Ear Q6H JED     pantoprazole  40 mg Oral Daily     polyethylene glycol  17 g Oral QPM     pravastatin  20 mg Oral QPM     psyllium  1 packet Oral QPM     sodium chloride (PF)  3 mL Intracatheter Q8H     [Held by provider] spironolactone  12.5 mg Oral Daily   Reviewed medications    Jc Ko MD  [facility] Medicine

## 2022-01-13 NOTE — PROVIDER NOTIFICATION
Patient placed on BiPAP on Med Surg until an ICU bed is available. Initial settings: 12/6, 100%.  Will titrate as able.  Pt tolerates well.  HFNC now on SB.    BiPAP settings/Parameters:       01/12/22 2242   Tech Time   $Tech Time (10 minute increments) 2   Mode: CPAP/ BiPAP/ AVAPS/ AVAPS AE   CPAP/BiPAP/ AVAPS/ AVAPS AE Mode BiPAP S/T   CPAP/BiPAP/Settings   $BIPAP/CPAP Therapy continuous   BIPAP/CPAP On Standby On   IPAP/EPAP (cmH2O) 12/6   Rate (breaths/min) 16   Oxygen (%) 100   Timed Inspiration (sec) 0.7   IPAP RISE  Settings (V60) 2   CPAP/BiPAP Patient Parameters   IPAP (cm H2O) 12 cmH2O   EPAP (cm H2O) 6 cmH2O   Pressure Support (cm H2O) 6 cmH2O   RR Total (breaths/min) 25 breaths/min   Vt (mL) 504 mL   Minute Ventilation (L/min) 12.5 L/min   Peak Inspiratory Pressure (cm H2O) 12 cmH2O   Pt.  Leak (L/min) 30 L/min   CPAP/BiPAP/AVAPS/AVAPS AE Alarms   High Pressure (cm H2O) 30 cmH2O   Low Pressure (cm H2O) 5   Low Pressure Delay (sec) 20 sec   CPAP/BiPAP/AVAPS/AVAPS AE Patient Assessment   Interface Face Mask - Small   Skin Integrity intact   RT Device Skin Assessment   Oxygen Delivery Device CPAP/BiPAP Mask   Site Appearance neck circumference Clean and dry   Site Appearance bridge of nose Clean and dry   Site Appearance occiput Clean and dry   Strap Tightness Finger Allowance between head and device strap   Skin Interventions Taken No adjustments needed

## 2022-01-13 NOTE — PLAN OF CARE
Skin affirmation note    Admitting nurse completed full skin assessment, Stanford score and Stanford interventions. This writer agrees with the initial skin assessment findings.

## 2022-01-14 LAB
ALBUMIN SERPL-MCNC: 2.3 G/DL (ref 3.4–5)
ALP SERPL-CCNC: 61 U/L (ref 40–150)
ALT SERPL W P-5'-P-CCNC: 20 U/L (ref 0–50)
ANION GAP SERPL CALCULATED.3IONS-SCNC: 9 MMOL/L (ref 3–14)
AST SERPL W P-5'-P-CCNC: 17 U/L (ref 0–45)
BILIRUB SERPL-MCNC: 0.4 MG/DL (ref 0.2–1.3)
BUN SERPL-MCNC: 54 MG/DL (ref 7–30)
CALCIUM SERPL-MCNC: 8.2 MG/DL (ref 8.5–10.1)
CHLORIDE BLD-SCNC: 93 MMOL/L (ref 94–109)
CO2 SERPL-SCNC: 24 MMOL/L (ref 20–32)
CREAT SERPL-MCNC: 2.14 MG/DL (ref 0.52–1.04)
ERYTHROCYTE [DISTWIDTH] IN BLOOD BY AUTOMATED COUNT: 12.5 % (ref 10–15)
GFR SERPL CREATININE-BSD FRML MDRD: 23 ML/MIN/1.73M2
GLUCOSE BLD-MCNC: 115 MG/DL (ref 70–99)
HCT VFR BLD AUTO: 25.2 % (ref 35–47)
HGB BLD-MCNC: 8.4 G/DL (ref 11.7–15.7)
INR PPP: 3.29 (ref 0.85–1.15)
MCH RBC QN AUTO: 31.9 PG (ref 26.5–33)
MCHC RBC AUTO-ENTMCNC: 33.3 G/DL (ref 31.5–36.5)
MCV RBC AUTO: 96 FL (ref 78–100)
PLATELET # BLD AUTO: 161 10E3/UL (ref 150–450)
POTASSIUM BLD-SCNC: 4.4 MMOL/L (ref 3.4–5.3)
PROT SERPL-MCNC: 5.4 G/DL (ref 6.8–8.8)
RBC # BLD AUTO: 2.63 10E6/UL (ref 3.8–5.2)
SODIUM SERPL-SCNC: 126 MMOL/L (ref 133–144)
WBC # BLD AUTO: 6.4 10E3/UL (ref 4–11)

## 2022-01-14 PROCEDURE — 36415 COLL VENOUS BLD VENIPUNCTURE: CPT | Performed by: INTERNAL MEDICINE

## 2022-01-14 PROCEDURE — 80053 COMPREHEN METABOLIC PANEL: CPT | Performed by: INTERNAL MEDICINE

## 2022-01-14 PROCEDURE — 250N000011 HC RX IP 250 OP 636: Performed by: INTERNAL MEDICINE

## 2022-01-14 PROCEDURE — 120N000004 HC R&B MS OVERFLOW

## 2022-01-14 PROCEDURE — 85027 COMPLETE CBC AUTOMATED: CPT | Performed by: INTERNAL MEDICINE

## 2022-01-14 PROCEDURE — 250N000013 HC RX MED GY IP 250 OP 250 PS 637: Performed by: INTERNAL MEDICINE

## 2022-01-14 PROCEDURE — 85610 PROTHROMBIN TIME: CPT | Performed by: INTERNAL MEDICINE

## 2022-01-14 PROCEDURE — 99233 SBSQ HOSP IP/OBS HIGH 50: CPT | Performed by: INTERNAL MEDICINE

## 2022-01-14 PROCEDURE — 99221 1ST HOSP IP/OBS SF/LOW 40: CPT | Performed by: INTERNAL MEDICINE

## 2022-01-14 RX ORDER — WARFARIN SODIUM 2.5 MG/1
2.5 TABLET ORAL
Status: COMPLETED | OUTPATIENT
Start: 2022-01-14 | End: 2022-01-14

## 2022-01-14 RX ADMIN — PANTOPRAZOLE SODIUM 40 MG: 40 TABLET, DELAYED RELEASE ORAL at 08:17

## 2022-01-14 RX ADMIN — DILTIAZEM HYDROCHLORIDE 240 MG: 240 CAPSULE, COATED, EXTENDED RELEASE ORAL at 17:10

## 2022-01-14 RX ADMIN — LEVOTHYROXINE SODIUM 88 MCG: 0.09 TABLET ORAL at 08:19

## 2022-01-14 RX ADMIN — WARFARIN SODIUM 2.5 MG: 2.5 TABLET ORAL at 17:10

## 2022-01-14 RX ADMIN — ALPRAZOLAM 0.25 MG: 0.25 TABLET ORAL at 08:17

## 2022-01-14 RX ADMIN — HYDROCODONE BITARTRATE AND ACETAMINOPHEN 1 TABLET: 5; 325 TABLET ORAL at 00:29

## 2022-01-14 RX ADMIN — FUROSEMIDE 40 MG: 10 INJECTION INTRAMUSCULAR; INTRAVENOUS at 17:09

## 2022-01-14 RX ADMIN — HYDROCODONE BITARTRATE AND ACETAMINOPHEN 1 TABLET: 5; 325 TABLET ORAL at 21:23

## 2022-01-14 RX ADMIN — CARVEDILOL 12.5 MG: 12.5 TABLET, FILM COATED ORAL at 21:22

## 2022-01-14 RX ADMIN — CARVEDILOL 25 MG: 25 TABLET, FILM COATED ORAL at 13:38

## 2022-01-14 RX ADMIN — Medication 1 MG: at 00:29

## 2022-01-14 RX ADMIN — NEOMYCIN SULFATE, POLYMYXIN B SULFATE AND HYDROCORTISONE 3 DROP: 3.5; 10000; 1 SUSPENSION AURICULAR (OTIC) at 00:29

## 2022-01-14 RX ADMIN — LATANOPROST 1 DROP: 50 SOLUTION OPHTHALMIC at 22:57

## 2022-01-14 RX ADMIN — ALPRAZOLAM 0.25 MG: 0.25 TABLET ORAL at 20:11

## 2022-01-14 RX ADMIN — LISINOPRIL 5 MG: 5 TABLET ORAL at 08:17

## 2022-01-14 RX ADMIN — NEOMYCIN SULFATE, POLYMYXIN B SULFATE AND HYDROCORTISONE 3 DROP: 3.5; 10000; 1 SUSPENSION AURICULAR (OTIC) at 06:31

## 2022-01-14 RX ADMIN — FUROSEMIDE 40 MG: 10 INJECTION INTRAMUSCULAR; INTRAVENOUS at 00:28

## 2022-01-14 RX ADMIN — FLECAINIDE ACETATE 50 MG: 50 TABLET ORAL at 20:11

## 2022-01-14 RX ADMIN — BRINZOLAMIDE 1 DROP: 10 SUSPENSION/ DROPS OPHTHALMIC at 08:21

## 2022-01-14 RX ADMIN — CARVEDILOL 12.5 MG: 12.5 TABLET, FILM COATED ORAL at 08:17

## 2022-01-14 RX ADMIN — FUROSEMIDE 40 MG: 10 INJECTION INTRAMUSCULAR; INTRAVENOUS at 08:17

## 2022-01-14 RX ADMIN — NEOMYCIN SULFATE, POLYMYXIN B SULFATE AND HYDROCORTISONE 3 DROP: 3.5; 10000; 1 SUSPENSION AURICULAR (OTIC) at 13:38

## 2022-01-14 RX ADMIN — BRINZOLAMIDE 1 DROP: 10 SUSPENSION/ DROPS OPHTHALMIC at 20:11

## 2022-01-14 RX ADMIN — FLECAINIDE ACETATE 50 MG: 50 TABLET ORAL at 08:19

## 2022-01-14 RX ADMIN — NEOMYCIN SULFATE, POLYMYXIN B SULFATE AND HYDROCORTISONE 3 DROP: 3.5; 10000; 1 SUSPENSION AURICULAR (OTIC) at 17:10

## 2022-01-14 RX ADMIN — LIDOCAINE 3 PATCH: 560 PATCH PERCUTANEOUS; TOPICAL; TRANSDERMAL at 20:26

## 2022-01-14 RX ADMIN — PRAVASTATIN SODIUM 20 MG: 20 TABLET ORAL at 17:10

## 2022-01-14 RX ADMIN — ASPIRIN 81 MG CHEWABLE TABLET 81 MG: 81 TABLET CHEWABLE at 17:10

## 2022-01-14 ASSESSMENT — ACTIVITIES OF DAILY LIVING (ADL)
ADLS_ACUITY_SCORE: 12
ADLS_ACUITY_SCORE: 11
ADLS_ACUITY_SCORE: 11
ADLS_ACUITY_SCORE: 12
ADLS_ACUITY_SCORE: 12
ADLS_ACUITY_SCORE: 11
ADLS_ACUITY_SCORE: 12
ADLS_ACUITY_SCORE: 11
ADLS_ACUITY_SCORE: 12
ADLS_ACUITY_SCORE: 11
ADLS_ACUITY_SCORE: 12
ADLS_ACUITY_SCORE: 11
ADLS_ACUITY_SCORE: 12

## 2022-01-14 NOTE — PROGRESS NOTES
Care Management Follow Up    Length of Stay (days): 2    Expected Discharge Date: 01/15/2022  Expected Time of Departure: 1/17/22  Concerns to be Addressed: discharge planning     Patient plan of care discussed at interdisciplinary rounds: Yes    Anticipated Discharge Disposition: Transitional Care       Anticipated Discharge DME: None    Patient/family educated on Medicare website which has current facility and service quality ratings:  Yes  Education Provided on the Discharge Plan:  Not at this time  Patient/Family in Agreement with the Plan:  Yes    Referrals Placed by CM/SW: External Care Coordination,Post Acute Facilities  Private pay costs discussed: Not applicable    Additional Information:    The discharge plan remains unchanged.    Plan:  Home with [facility]      Ivette Dougherty RN

## 2022-01-14 NOTE — PROGRESS NOTES
Patient up to chair with SBA, remains on 3L NC baseline o2. Steady on feet with transfers, up to commode,patient reports diarrhea last night several times, stool softners not given today. Call light in reach and able to make needs known. Spouse at bedside at this time.

## 2022-01-14 NOTE — PROGRESS NOTES
Patient was up to chair for meals, at this time she is resting in bed, spouse stated to let her sleep with continue to monitor. Meal tray at bedside for when she wakes up. Hernández intact with yellow urine.   Resting on and off throughout this shift. Up to commode small quarter size loose stool, held stool softners today due to diarrhea last night. Remains on baseline 3LNC. SBA to commode and back to bed. Call light in reach and able to make needs known.

## 2022-01-14 NOTE — PLAN OF CARE
Patient has been resting in between cares, fixated on bowels and demanded bowel medication as she does at home.  Did give metamucil and Mirilax as requested, tonight did have a loose stool to commode but states feeling better after this.  Hernández output adequate with Lasix dose due at [place].  Edema to lower extremities 2+ edema.  States good pain relief with earlier pain pill.  No drainage noted to right ear.  Plan of care continues.

## 2022-01-14 NOTE — PLAN OF CARE
Problem: Fluid Volume Excess  Goal: Fluid Balance  Outcome: No Change   Hernández remains in for adequate output after lasix IV given. UP to chair for meals. Call light in reach.

## 2022-01-14 NOTE — PROGRESS NOTES
Noted increased work of breathing, use of abdominal and accessory muscles. Sats remained in the 90's on Nasal Canula. Pt reports feeling increased SOB. RT placed patient back on bipap,  notified of EKG and pt status. CXR ordered.

## 2022-01-14 NOTE — PLAN OF CARE
RESTED WELL ONCE SETTLED TO SLEEP,  CONTINUES TO BE CONCERNED ABOUT BOWELS DESPITE HAVING 2 MORE STOOLS OVERNIGHT, LAST ONE WITH MORE CONSISTENCY AND NOT AS LOOSE.  STATES RECEIVED GOOD RESULTS FROM PAIN PILL AT BEDTIME AND RESTED.  CONTINUES WITH NC AT 3 LPM, SATURATIONS IN THE MID TO LOW 90'S,  PLAN OF CARE CONTINUES.

## 2022-01-14 NOTE — PROGRESS NOTES
Pacer unable to be interrogated. [employer] rep reviewed prior interrogation reports from [facility] [place] asked that magnet be applied over patients pacemaker to see if there was a rate change and paced rhythm with pacer spikes, no change noted. 12 Lead EKG done and captures pacemaker spikes.

## 2022-01-14 NOTE — PROGRESS NOTES
"[facility] Medicine Progress Note  Date of Service: 01/14/2022     Assessment & Plan      Aditi Palacios is a 81 year old female admitted on 1/10/2022. She presented to the emergency department for evaluation of difficulty urinating and some shortness of breath, was found to have acute on chronic renal failure and later developed CHF exacerbation for which she is being admitted for further evaluation and treatment.    Acute respiratory failure with hypoxia    Was not hypoxic upon presentation, but developed hypoxia on 1/12/22 while still in the emergency department requiring 2L NC. Occurred after receiving 2L LR on 1/10, followed by maintenance IV fluids @ 125 ml/hr for presumed pre-renal azotemia. Chest x-ray with left > right bilateral pleural effusion with \"engorged indistinct central pulmonary vasculature, correlate for interstitial pulmonary edema.\" Developed flash pulmonary edema evening of 1/12/22 shortly after admission as noted below, required initiation of BiPAP. Clinical presentation is most consistent with CHF exacerbation / flash pulmonary edema.     Improved with diuresis, now on 2-3 lpm NC.    - treat underlying issues as below   - stable for med/surg    Flash pulmonary edema   (HFpEF) heart failure with preserved ejection fraction   Pulmonary hypertension   Tricuspid regurgitation  Mitral regurgitation  Resistant hypertension    Does not appear to carry a formal diagnosis of CHF prior to admission. Managed prior to admission with carvedilol (12.5 mg bid, plus 25 mg mid-day), diltiazem  mg q pm, furosemide 20 mg daily, spironolactone 12.5 mg daily, and lisinopril (5 mg q am, 30 mg q hs).     Echo 1/12/22 showed EF 60-65%, grade II diastolic dysfunction, moderate pulmonary hypertension, moderate mitral regurgitation, mild to moderate tricuspid regurgitation.     She was given IV fluids in the emergency department as noted above and developed acute " respiratory distress evening of 1/12 after being admitted to med/surg. Work-up was consistent with flash pulmonary edema. Patient was placed on HFNC followed by BiPAP. She was given 40 mg IV lasix x 2, SL nitroglycerine x 1, and nitropaste with improvement. Transferred to ICU for BiPAP and nicardipine infusion. Nicardipine was not started/needed. Weaned off BiPAP overnight. Continued furosemide 40 mg q8h IV.    Cardiology consulted today 1/14/2022 and recs appreciated. PM dysfunction unlikely to be cause of heart failure and more likely patient was dry on arrival and developed heart failure with IV fluids. Mitral regurgitation can lead to more rapid decompensation in the face of volume overload.    - continue diuresis today  - Continue beta blocker, CCB, spironolactone, and ACEi (oral furosemide on hold)  - Daily weights  - Strict I&Os  Addendum: late afternoon, lungs clear on auscultation. Want to avoid overdiuresis.    - stop IV furosemide after 1730 dose and eval again in the morning    Paroxysmal atrial fibrillation  Chronic anticoagulation   Sick sinus syndrome  Cardiac pacemaker in situ    [facility] Known history of paroxysmal atrial fibrillation, has a pacemaker. Rate / rhythm controlled prior to admission with carvedilol (12.5 mg bid and 25 mg at midday), diltiazem  mg q pm, and flecainide 50 mg bid. Anticoagulated with warfarin, admit INR 1.77.      reports that pacemaker very close to end of life.  and RN reported occasional HR 50's seen on telemetry. Pacemaker interrogation 1/13/2022 suggested the PM was needing to be replaced. ECG obtained and pacer spikes clearly seen in lead V2 at rate about 64, and there was occasional premature complex that was low amplitude and followed by compensatory pause. I suspect this is what leads to an occasional lower rate on telemetry. Patient does not appear to have had AVN ablation.     Cardiology consulted as  above. PM working currently and patient should follow up with her PM clinic on discharge.   - Continue beta blocker, CCB, and flecainide   - Pharmacy to dose warfarin    Acute on chronic renal failure   CKD (chronic kidney disease) stage 3, GFR 30-59 ml/min    Admit 1/10/2022 creatinine 3.50 (baseline 1.49 - 1.70), GFR 13 (baseline 28 - 33). Creatinine was baseline 1.61 on 1/5/2022 on labs drawn prior to upcoming nephrology appointment through [facility].       From 1/5/2022 to 1/10/2022, patient had decreased appetite and some loose stool though also taking bowel meds. She was drinking fluids. C/o decreased urine output and urinary retention though was not actually retaining much, 200 mL, on 1/8/2022 and 211 mL on 1/10/2022. Patient on ACEi but no known hypotension.     She was initially treated for suspected prerenal azotemia and given large volume of IV fluids in the emergency department. FeNa = 1.8%. UA unremarkable.  Creatinine has trended down both with IV fluids given on admission and also with diuresis subsequently.  Creatinine 2.1 today.    Cardiology consulted as above and feels hypovolemia most likely and unlikely to have been in acute heart failure with prerenal physiology on initial presentation.    - hold lisinopril until normalizing  -Continue to follow with further diuresis  - Daily BMP    Urinary retention / congenital urethral stenosis    Patient with known history of urethral stenosis, has had urethral dilatations x3 in the past (most recent 2/4/21). Follows with urologist Dr. Alonzo through [facility]. Patient noticed she could not urinate 1/8/2022 and was seen in [facility]. No labs then and had bladder drained via catheter of only about 200 mL light yellow urine. Patient does not feel her urethral stenosis is the cause of her current problem. Had recurrent voiding difficulties so presented to [facility] emergency department 1/10/22. No hydronephrosis noted on abdominal  "ultrasound. Bladder scan in ED early 1/11/2022 showed 211 mL urine.  Hernández catheter placed in ED.    -Continue Hernández catheter   - Does not need inpatient urology consultation at this time    Abnormal ultrasound of gallbladder    Noted on CT abdomen & pelvis - \" strandy densities around the gallbladder with indistinct edge.  Acute cholecystitis not excluded \" Abdominal ultrasound - \" abnormal thickening of the gallbladder wall at 0.5 cm.  There is a small amount of pericholecystic fluid.  This can be associated with acute cholecystitis but has a differential diagnosis also including other etiology such as hypoalbuminemia, hepatitis, congestive heart failure as well as others.\" Non-tender right upper quadrant. LFTs within normal limits. Suspect this is related to new CHF findings and was present on day of admission 1/10/2022.    Recheck of LFTs normal 1/14/2022. No additional work up unless patient develops symptoms consistent with cholecystitis    Recent diarrhea    New onset of loose stool around 1/9/22, estimates about 4 loose stools in past 24 hours. Has malaise,  also not feeling very well. Afebrile, no CT abdomen abnormalities other than gallbladder as noted above and some reactive edema of the bowel wall.     May be effect of bowel meds.   - Monitor, if diarrhea worsens then send an enteric panel    SIADH (syndrome of inappropriate ADH production) (H)  Chronic hyponatremia    Baseline Na between 131 - 133. Is on a fluid restriction due to SIADH. Admit Na = 129, has varied between 126 - 129 thus far. Mildly worse hyponatremia likely due to hypervolemia.    - follow sodium   - fluid restriction as at home 1500 mL    Right ear pain, possible mild otitis externa    Right ear pain for a few days.  She believes this is an ear infection.  Does not believe she is prone to ear infections.  There is wax in the ear and I could see just a slight bit of the tympanic membrane which does appear a little bulged but not " red.  There may be some mild otitis externa.  Patient would like her ears cleaned however does not usually done in the inpatient environment.    Started trial 1/13/2022 of poly-/carrie-/hydrocortisone drops right ear, and ear is feeling better.    - continue drops    Anemia of chronic disease    Baseline hemoglobin 10. Hemoglobin 9.7 after fluid resuscitation. Today 8.4. No bleeding seen.    - recheck in the morning    Peripheral vascular disease  Occlusion of superior mesenteric artery  Stenosis of inferior mesenteric artery  Fibromuscular dysplasia of right renal artery  Celiac artery stenosis  Subclavian steal syndrome of left subclavian artery  Iliac artery occlusion, bilateral, s/p stents  Hyperlipidemia  Extensive vascular history, s/p iliac stents bilaterally. Follows with Dr. Harding through [facility]. Managed prior to admission with aspirin 81 mg daily, pravastatin 20 mg q pm, and coumadin.   - Continue aspirin and statin  - Anticoagulation as above    Hypothyroidism  Managed prior to admission with levothyroxine 88 mcg daily, continue.     LALITA (generalized anxiety disorder)  Anxious mood on admission, likely in part due to respiratory status. Managed prior to admission with Xanax 0.25 mg bid prn and hydroxyzine 25 mg tid prn. Patient did not like hydroxyzine due to severe dry mouth; it can also exacerbate urinary retention  - Continue Xanax    - Discontinue hydroxyzine    Chronic pain  DDD (degenerative disc disease), lumbar  Fibromyalgia  Stable. Managed prior to admission with Norco q6h prn and Lidocaine patches.   - Continue home regimen    Esophageal reflux  Managed prior to admission with omeprazole 20 mg daily, continue.     Irritable bowel syndrome with constipation  Prn bowel regimen available    Essential tremor  Not on any medications currently.     History of lung cancer  S/p left upper lobe lobectomy in 2001. Not an active problem.    Glaucoma  Continue home eye drops.    COVID  status  Known COVID infection in October 2020.   Vaccinated with Pfizer x 2 (dates unknown), no booster to date.        Diet: Combination Diet Low Saturated Fat Na <2400mg Diet  Fluid restriction 1500 ML FLUID    DVT Prophylaxis: Warfarin  Hernández Catheter: PRESENT, indication: Strict 1-2 Hour I&O  Central Lines: None  Code Status: Full Code  - discussed at time of admission       Discussion: Improving clinically.  Encourage patient to be more active. Discussed with cardiology.     Disposition Plan   Expected Discharge: possibly tomorrow or next day to home    Attestation:  Total time: 55 minutes over two visits    Jc Ko MD  [facility] Medicine        Interval History   Feels some shortness of breath, no chest pain, no abdominal pain. C/o back pain which is not new. C/o loose stool x 2.     Physical Exam   Temp:  [97.4  F (36.3  C)-98.5  F (36.9  C)] 97.7  F (36.5  C)  Pulse:  [62-78] 66  Resp:  [19-36] 20  BP: (100-192)/() 106/70  FiO2 (%):  [55 %] 55 %  SpO2:  [91 %-99 %] 94 %    Weights:   Vitals:    01/10/22 0823 01/13/22 0400   Weight: 73.5 kg (162 lb) 81.4 kg (179 lb 7.3 oz)    Body mass index is 30.8 kg/m .    Constitutional: alert and oriented x 3, NAD  CV: Regular, no edema  Respiratory: Bilateral basilar crackles otherwise CTA bilaterally  GI: Soft, non-tender, bowel sounds normal  Skin: Warm and dry    Data   Recent Labs   Lab 01/14/22  0417 01/13/22  1642 01/13/22  1505 01/13/22  1453 01/13/22  0841 01/12/22  2215 01/12/22  1310 01/12/22  0845 01/12/22  0835 01/11/22  0532 01/10/22  1105   WBC 6.4  --   --   --   --   --   --  12.5*  --   --   --    HGB 8.4*  --   --   --   --   --   --  9.7*  --   --   --    MCV 96  --   --   --   --   --   --  101*  --   --   --      --   --   --   --   --   --  248  --   --   --    INR 3.29*  --   --  2.76*  --   --   --   --  2.26*   < > 1.77*   *  --  128*  --   --  126*   < >  --  129*   < > 129*   POTASSIUM 4.4  --  4.6  --   --  5.3   < >   --  5.1   < > 5.6*   CHLORIDE 93*  --  96  --   --  95   < >  --  99   < > 99   CO2 24  --  25  --   --  21   < >  --  20   < > 21   BUN 54*  --  49*  --   --  49*   < >  --  51*   < > 61*   CR 2.14*  --  2.04*  --   --  2.13*   < >  --  2.20*   < > 3.50*   ANIONGAP 9  --  7  --   --  10   < >  --  10   < > 9   BETH 8.2*  --  8.5  --   --  8.4*   < >  --  8.0*   < > 8.8   * 127* 130*  --    < > 127*   < >  --  127*   < > 116*   ALBUMIN 2.3*  --   --   --   --   --   --   --   --   --  3.0*   PROTTOTAL 5.4*  --   --   --   --   --   --   --   --   --  6.4*   BILITOTAL 0.4  --   --   --   --   --   --   --   --   --  0.3   ALKPHOS 61  --   --   --   --   --   --   --   --   --  69   ALT 20  --   --   --   --   --   --   --   --   --  18   AST 17  --   --   --   --   --   --   --   --   --  11   LIPASE  --   --   --   --   --   --   --   --   --   --  85    < > = values in this interval not displayed.       Recent Labs   Lab 01/14/22  0417 01/13/22  1642 01/13/22  1505 01/13/22  1116 01/13/22  0841 01/12/22  2215   * 127* 130* 122* 121* 127*        Unresulted Labs Ordered in the Past 30 Days of this Admission     No orders found from 12/11/2021 to 1/11/2022.           Imaging:   Recent Results (from the past 24 hour(s))   XR Chest Port 1 View    Narrative    EXAM: XR CHEST PORT 1 VIEW  LOCATION: [facility]  DATE/TIME: 1/13/2022 6:44 PM    INDICATION: Increased respiratory distress  COMPARISON: 01/12/2022 at 2217      Impression    IMPRESSION: Small bilateral pleural effusions similar to prior. Mild increased vascularity unchanged. Mild bibasilar atelectatic changes similar to prior. No pneumothorax.        I reviewed all new labs and imaging results over the last 24 hours. I personally reviewed no images or EKG's today.    Medications     - MEDICATION INSTRUCTIONS -       Warfarin Therapy Reminder         ALPRAZolam  0.25 mg Oral BID     aspirin  81 mg Oral QPM     brinzolamide   1 drop Left Eye BID     carvedilol  12.5 mg Oral BID     carvedilol  25 mg Oral Daily with lunch     diltiazem ER COATED BEADS  240 mg Oral QPM     flecainide  50 mg Oral BID     furosemide  40 mg Intravenous Q8H     [Held by provider] furosemide  20 mg Oral Daily     latanoprost  1 drop Both Eyes At Bedtime     levothyroxine  88 mcg Oral Daily     lidocaine  3 patch Transdermal Q24h    And     lidocaine   Transdermal Q8H     [Held by provider] lisinopril  30 mg Oral At Bedtime     [Held by provider] lisinopril  5 mg Oral QAM     neomycin-polymyxin-hydrocortisone  3 drop Right Ear Q6H JED     pantoprazole  40 mg Oral Daily     polyethylene glycol  17 g Oral QPM     pravastatin  20 mg Oral QPM     psyllium  1 packet Oral QPM     sodium chloride (PF)  3 mL Intracatheter Q8H     [Held by provider] spironolactone  12.5 mg Oral Daily   Reviewed medications    Jc Ko MD  [facility] Medicine

## 2022-01-15 VITALS
RESPIRATION RATE: 19 BRPM | HEIGHT: 64 IN | DIASTOLIC BLOOD PRESSURE: 61 MMHG | WEIGHT: 179.68 LBS | TEMPERATURE: 97.5 F | HEART RATE: 68 BPM | OXYGEN SATURATION: 92 % | BODY MASS INDEX: 30.67 KG/M2 | SYSTOLIC BLOOD PRESSURE: 154 MMHG

## 2022-01-15 LAB
ANION GAP SERPL CALCULATED.3IONS-SCNC: 7 MMOL/L (ref 3–14)
BUN SERPL-MCNC: 52 MG/DL (ref 7–30)
CALCIUM SERPL-MCNC: 8.3 MG/DL (ref 8.5–10.1)
CHLORIDE BLD-SCNC: 95 MMOL/L (ref 94–109)
CO2 SERPL-SCNC: 24 MMOL/L (ref 20–32)
CREAT SERPL-MCNC: 1.87 MG/DL (ref 0.52–1.04)
ERYTHROCYTE [DISTWIDTH] IN BLOOD BY AUTOMATED COUNT: 12.6 % (ref 10–15)
GFR SERPL CREATININE-BSD FRML MDRD: 27 ML/MIN/1.73M2
GLUCOSE BLD-MCNC: 113 MG/DL (ref 70–99)
HCT VFR BLD AUTO: 26.8 % (ref 35–47)
HGB BLD-MCNC: 9 G/DL (ref 11.7–15.7)
INR PPP: 2.94 (ref 0.85–1.15)
MCH RBC QN AUTO: 32.4 PG (ref 26.5–33)
MCHC RBC AUTO-ENTMCNC: 33.6 G/DL (ref 31.5–36.5)
MCV RBC AUTO: 96 FL (ref 78–100)
PLATELET # BLD AUTO: 266 10E3/UL (ref 150–450)
POTASSIUM BLD-SCNC: 4.9 MMOL/L (ref 3.4–5.3)
RBC # BLD AUTO: 2.78 10E6/UL (ref 3.8–5.2)
SODIUM SERPL-SCNC: 126 MMOL/L (ref 133–144)
WBC # BLD AUTO: 6 10E3/UL (ref 4–11)

## 2022-01-15 PROCEDURE — 36415 COLL VENOUS BLD VENIPUNCTURE: CPT | Performed by: INTERNAL MEDICINE

## 2022-01-15 PROCEDURE — 99239 HOSP IP/OBS DSCHRG MGMT >30: CPT | Performed by: INTERNAL MEDICINE

## 2022-01-15 PROCEDURE — 85014 HEMATOCRIT: CPT | Performed by: INTERNAL MEDICINE

## 2022-01-15 PROCEDURE — 250N000013 HC RX MED GY IP 250 OP 250 PS 637: Performed by: INTERNAL MEDICINE

## 2022-01-15 PROCEDURE — 80048 BASIC METABOLIC PNL TOTAL CA: CPT | Performed by: INTERNAL MEDICINE

## 2022-01-15 PROCEDURE — 85610 PROTHROMBIN TIME: CPT | Performed by: INTERNAL MEDICINE

## 2022-01-15 RX ORDER — LISINOPRIL 5 MG/1
5 TABLET ORAL EVERY MORNING
Status: DISCONTINUED | OUTPATIENT
Start: 2022-01-15 | End: 2022-01-15 | Stop reason: HOSPADM

## 2022-01-15 RX ORDER — NEOMYCIN SULFATE, POLYMYXIN B SULFATE AND HYDROCORTISONE 10; 3.5; 1 MG/ML; MG/ML; [USP'U]/ML
3 SUSPENSION/ DROPS AURICULAR (OTIC) 4 TIMES DAILY
Qty: 3 ML | Refills: 0 | Status: SHIPPED | OUTPATIENT
Start: 2022-01-15 | End: 2022-01-20

## 2022-01-15 RX ORDER — FUROSEMIDE 20 MG
20 TABLET ORAL DAILY
Status: DISCONTINUED | OUTPATIENT
Start: 2022-01-15 | End: 2022-01-15 | Stop reason: HOSPADM

## 2022-01-15 RX ADMIN — NEOMYCIN SULFATE, POLYMYXIN B SULFATE AND HYDROCORTISONE 3 DROP: 3.5; 10000; 1 SUSPENSION AURICULAR (OTIC) at 00:00

## 2022-01-15 RX ADMIN — PANTOPRAZOLE SODIUM 40 MG: 40 TABLET, DELAYED RELEASE ORAL at 08:45

## 2022-01-15 RX ADMIN — CARVEDILOL 12.5 MG: 12.5 TABLET, FILM COATED ORAL at 08:44

## 2022-01-15 RX ADMIN — BRINZOLAMIDE 1 DROP: 10 SUSPENSION/ DROPS OPHTHALMIC at 08:44

## 2022-01-15 RX ADMIN — LEVOTHYROXINE SODIUM 88 MCG: 0.09 TABLET ORAL at 08:45

## 2022-01-15 RX ADMIN — LISINOPRIL 5 MG: 5 TABLET ORAL at 08:53

## 2022-01-15 RX ADMIN — NEOMYCIN SULFATE, POLYMYXIN B SULFATE AND HYDROCORTISONE 3 DROP: 3.5; 10000; 1 SUSPENSION AURICULAR (OTIC) at 06:12

## 2022-01-15 RX ADMIN — FLECAINIDE ACETATE 50 MG: 50 TABLET ORAL at 08:45

## 2022-01-15 RX ADMIN — FUROSEMIDE 20 MG: 20 TABLET ORAL at 08:53

## 2022-01-15 RX ADMIN — ALPRAZOLAM 0.25 MG: 0.25 TABLET ORAL at 08:44

## 2022-01-15 ASSESSMENT — ACTIVITIES OF DAILY LIVING (ADL)
ADLS_ACUITY_SCORE: 12
ADLS_ACUITY_SCORE: 11
ADLS_ACUITY_SCORE: 12
ADLS_ACUITY_SCORE: 9
ADLS_ACUITY_SCORE: 12

## 2022-01-15 ASSESSMENT — MIFFLIN-ST. JEOR: SCORE: 1265

## 2022-01-15 NOTE — PROGRESS NOTES
Patient requesting to try to get off oxygen, did turn off for 15 minutes, dropped to 87%.  Turned back on to 2 LPM/ NC and back to 92%.

## 2022-01-15 NOTE — PLAN OF CARE
Problem: Fluid Volume Excess  Goal: Fluid Balance  Outcome: No Change     Problem: Risk for Delirium  Goal: Improved Behavioral Control  Outcome: No Change     Problem: Risk for Delirium  Goal: Optimal Coping  Outcome: No Change   Patient up to commode/bathroom with SBA, smith removed last night per patient, continue to measure urine output. Up to chair for meals spouse at bedside. Patient does appear to have some confusion of situation and asks same questions over again. Education given to spouse and patient regarding plan of care and medication changes.

## 2022-01-15 NOTE — PROGRESS NOTES
Patient up to bathroom with staff help, spouse helped back to chair after use of bathroom didn't wait for staff to help. Patient and spouse are upset this morning regarding length of stay in hospital and wanting to be discharged or have a plan.Writer updated MD and patient and spouse wishes to go home. Small void noted in toilet. Patient is short with answers. Patient has own eye drops in room and states she already took her eye drops, education given to not take home medications while in the hospital so we don't double the dose of things. Patient didn't respond to writer. Meal tray set up for patient, spouse at bedside. Call light in reach

## 2022-01-15 NOTE — DISCHARGE INSTRUCTIONS
Patient to schedule a post hospital follow up appointment with her primary doctor 5-7 days following discharge.    Coumadin Discharge Instructions:  1. Continue Coumadin 5 mg Wed, 2.5 mg rest of the week.   2. Next INR recheck according to primary care clinic.

## 2022-01-15 NOTE — PLAN OF CARE
[facility] [facility] DISCHARGE NOTE    Patient discharged to home at 10:36 AM via wheel chair. Accompanied by spouse and staff. Discharge instructions reviewed with patient and spouse, opportunity offered to ask questions. Prescriptions sent to patients preferred pharmacy. All belongings sent with patient.    Socorro Dooley RN

## 2022-01-15 NOTE — PROGRESS NOTES
Care Management Discharge Note    Discharge Date: 01/15/2022  Expected Time of Departure: 1/15/22    Discharge Disposition: Transitional Care    Discharge Services:  Home Care    Discharge DME: None    Discharge Transportation:  Family    Private pay costs discussed: Not applicable    Patient/family educated on Medicare website which has current facility and service quality ratings:  Yes    Education Provided on the Discharge Plan:  Yes  Persons Notified of Discharge Plans: Patient  Patient/Family in Agreement with the Plan:  Yes    Handoff Referral Completed: Yes    Additional Information:    Plan:  Home with [facility]      Ivette Dougherty RN

## 2022-01-15 NOTE — DISCHARGE SUMMARY
[facility]  Discharge Summary  Hospital Medicine       Date of Admission:  1/10/2022  Date of Discharge:  1/15/2022  Discharging Provider: Jc Ko MD      Identification and Chief Compaint: Aditi Palacios is a 81 year old female who presented on 1/10/2022 with complaint of lower abdominal pain and was found in acute renal failure. After IV fluids, patient developed acute respiratory distress.    Discharge Diagnoses    Acute on chronic renal failure  Acute respiratory failure with hypoxia  Flash pulmonary edema   (HFpEF) heart failure with preserved ejection fraction   Pulmonary hypertension   Tricuspid regurgitation  Mitral regurgitation  Cardiac pacemaker in situ at end of life  Possible mild otitis externa  Resistant hypertension  Paroxysmal atrial fibrillation  Chronic anticoagulation   Sick sinus syndrome  Urinary retention / congenital urethral stenosis  SIADH (syndrome of inappropriate ADH production) (H)  Chronic hyponatremia  Anemia of chronic disease  Peripheral vascular disease  Occlusion of superior mesenteric artery  Stenosis of inferior mesenteric artery  Fibromuscular dysplasia of right renal artery  Celiac artery stenosis  Subclavian steal syndrome of left subclavian artery  Iliac artery occlusion, bilateral, s/p stents  Hyperlipidemia  Hypothyroidism  LALITA (generalized anxiety disorder)  Chronic pain  DDD (degenerative disc disease), lumbar  Fibromyalgia  Esophageal reflux  Glaucoma     Follow-ups Needed After Discharge   Follow-up Appointments     Follow-up and recommended labs and tests       Follow up with your nephrologist in the next 7-10 days. Follow up with   your pacemaker clinic regarding the pacemaker being at end of life.             Hospital Course      Aditi Palacios is a 81 year old female admitted on 1/10/2022. She presented to the emergency department for evaluation of difficulty urinating and some shortness of breath, was found to have  "acute on chronic renal failure and later developed CHF exacerbation for which she is being admitted for further evaluation and treatment.    Acute respiratory failure with hypoxia    Was not hypoxic upon presentation, but developed hypoxia on 1/12/22 while still in the emergency department requiring 2L NC. Occurred after receiving 2L LR on 1/10, followed by maintenance IV fluids @ 125 ml/hr for presumed pre-renal azotemia. Chest x-ray with left > right bilateral pleural effusion with \"engorged indistinct central pulmonary vasculature, correlate for interstitial pulmonary edema.\" Developed flash pulmonary edema evening of 1/12/22 shortly after admission as noted below, required initiation of BiPAP. Clinical presentation is most consistent with CHF exacerbation / flash pulmonary edema.     Improved with diuresis and able to wean off O2 prior to discharge.     Flash pulmonary edema   (HFpEF) heart failure with preserved ejection fraction   Pulmonary hypertension   Tricuspid regurgitation  Mitral regurgitation  Resistant hypertension    Does not appear to carry a formal diagnosis of CHF prior to admission. Managed prior to admission with carvedilol (12.5 mg bid, plus 25 mg mid-day), diltiazem  mg q pm, furosemide 20 mg daily, spironolactone 12.5 mg daily, and lisinopril (5 mg q am, 30 mg q hs).     Echo 1/12/22 showed EF 60-65%, grade II diastolic dysfunction, moderate pulmonary hypertension, moderate mitral regurgitation, mild to moderate tricuspid regurgitation.     She was given IV fluids in the emergency department as noted above and developed acute respiratory distress evening of 1/12 after being admitted to med/surg. Work-up was consistent with flash pulmonary edema. Patient was placed on HFNC followed by BiPAP. She was given 40 mg IV lasix x 2, SL nitroglycerine x 1, and nitropaste with improvement. Transferred to ICU for BiPAP and nicardipine infusion. Nicardipine was not started/needed. Weaned off BiPAP " overnight. Continued furosemide 40 mg q8h IV.    Cardiology consulted 1/14/2022 and recs appreciated. PM dysfunction unlikely to be cause of heart failure and more likely patient was dry on arrival and developed heart failure with IV fluids. Mitral regurgitation can lead to more rapid decompensation in the face of volume overload.     Diuresed with IV furosemide with resolution of hypoxemia and renal function also improved. Resume home regimen on discharge.     Paroxysmal atrial fibrillation  Chronic anticoagulation   Sick sinus syndrome  Cardiac pacemaker in situ at end of life    Follows with cardiology through [facility]. Known history of paroxysmal atrial fibrillation, has a pacemaker. Rate / rhythm controlled prior to admission with carvedilol (12.5 mg bid and 25 mg at midday), diltiazem  mg q pm, and flecainide 50 mg bid. Anticoagulated with warfarin, admit INR 1.77.      reports that pacemaker very close to end of life.  and RN reported occasional HR 50's seen on telemetry. Pacemaker interrogation 1/13/2022 suggested the PM was needing to be replaced. ECG obtained and pacer spikes clearly seen in lead V2 at rate about 64, and there was occasional premature complex that was low amplitude and followed by compensatory pause. I suspect this is what leads to an occasional lower rate on telemetry. Patient does not appear to have had AVN ablation.     Cardiology consulted as above. PM working currently but is at EOL, and patient should follow up with her PM clinic on discharge.     Acute on chronic renal failure   CKD (chronic kidney disease) stage 3, GFR 30-59 ml/min  Chronic hyperkalemia    Admit 1/10/2022 creatinine 3.50 (baseline 1.49 - 1.70), GFR 13 (baseline 28 - 33). Creatinine was baseline 1.61 on 1/5/2022 on labs drawn prior to upcoming nephrology appointment through [facility].       From 1/5/2022 to 1/10/2022, patient had decreased appetite and some loose stool though also  "taking bowel meds. She was drinking fluids. C/o decreased urine output and urinary retention though was not actually retaining much, 200 mL, on 1/8/2022 and 211 mL on 1/10/2022. Patient on ACEi but no known hypotension.     She was initially treated for suspected prerenal azotemia and given large volume of IV fluids in the emergency department. FeNa = 1.8%. UA unremarkable.  Creatinine has trended down both with IV fluids given on admission and also with diuresis subsequently.  Creatinine 2.1 today.    Cardiology consulted as above and feels hypovolemia most likely and unlikely to have been in acute heart failure with prerenal physiology on initial presentation.     Creatinine improved to 1.87 on day of discharge. Potassium is 4.9 but patient has chronic hyperkalemia 5.4-5.5. Ok to resume lisinopril and spironolactone.    Urinary retention / congenital urethral stenosis    Patient with known history of urethral stenosis, has had urethral dilatations x3 in the past (most recent 2/4/21). Follows with urologist Dr. Alonzo through [facility]. Patient noticed she could not urinate 1/8/2022 and was seen in [facility]. No labs then and had bladder drained via catheter of only about 200 mL light yellow urine. Patient does not feel her urethral stenosis is the cause of her current problem. Had recurrent voiding difficulties so presented to [facility] emergency department 1/10/22. No hydronephrosis noted on abdominal ultrasound. Bladder scan in ED early 1/11/2022 showed 211 mL urine.  Hernández catheter placed in ED. Hernández removed 1/14/2022.   - Does not need inpatient urology consultation at this time    Abnormal ultrasound of gallbladder    Noted on CT abdomen & pelvis - \" strandy densities around the gallbladder with indistinct edge.  Acute cholecystitis not excluded \" Abdominal ultrasound - \" abnormal thickening of the gallbladder wall at 0.5 cm.  There is a small amount of pericholecystic fluid.  This can be " "associated with acute cholecystitis but has a differential diagnosis also including other etiology such as hypoalbuminemia, hepatitis, congestive heart failure as well as others.\" Non-tender right upper quadrant. LFTs within normal limits. Suspect this is related to new CHF findings and was present on day of admission 1/10/2022.    Recheck of LFTs normal 1/14/2022. No additional work up unless patient develops symptoms consistent with cholecystitis    Recent loose stool    New onset of loose stool around 1/9/22, estimates about 4 loose stools in past 24 hours. Has malaise,  also not feeling very well. Afebrile, no CT abdomen abnormalities other than gallbladder as noted above and some reactive edema of the bowel wall.     May be effect of bowel meds. Does not have profuse watery diarrhea.     SIADH (syndrome of inappropriate ADH production) (H)  Chronic hyponatremia    Baseline Na between 131 - 133. Is on a fluid restriction due to SIADH. Admit Na = 129, has varied between 126 - 129 thus far. Mildly worse hyponatremia likely due to hypervolemia.     Sodium 126 on discharge. Follow up with nephrologist.     Right ear pain, possible mild otitis externa    Right ear pain for a few days.  She believes this is an ear infection.  Does not believe she is prone to ear infections.  There is wax in the ear and I could see just a slight bit of the tympanic membrane which does appear a little bulged but not red.  There may be some mild otitis externa.  Patient would like her ears cleaned however does not usually done in the inpatient environment.    Started trial 1/13/2022 of poly-/carrie-/hydrocortisone drops right ear, and ear is feeling better.    - continue drops on discharge for 5 more days.    Anemia of chronic disease    Baseline hemoglobin 10. Hemoglobin 9.7 after fluid resuscitation. Today 8.4. No bleeding seen. Hemoglobin 9.0 on discharge.     Peripheral vascular disease  Occlusion of superior mesenteric " artery  Stenosis of inferior mesenteric artery  Fibromuscular dysplasia of right renal artery  Celiac artery stenosis  Subclavian steal syndrome of left subclavian artery  Iliac artery occlusion, bilateral, s/p stents  Hyperlipidemia  Extensive vascular history, s/p iliac stents bilaterally. Follows with Dr. Harding [facility]. Managed prior to admission with aspirin 81 mg daily, pravastatin 20 mg q pm, and coumadin.   - Continue aspirin and statin  - Anticoagulation as above    Hypothyroidism  Managed prior to admission with levothyroxine 88 mcg daily, continue.     LALITA (generalized anxiety disorder)  Anxious mood on admission, likely in part due to respiratory status. Managed prior to admission with Xanax 0.25 mg bid prn and hydroxyzine 25 mg tid prn. Patient did not like hydroxyzine due to severe dry mouth; it can also exacerbate urinary retention  - Continue Xanax   - Discontinue hydroxyzine    Chronic pain  DDD (degenerative disc disease), lumbar  Fibromyalgia  Stable. Managed prior to admission with Norco q6h prn and Lidocaine patches.   - Continue home regimen    Esophageal reflux  Managed prior to admission with omeprazole 20 mg daily, continue.     Irritable bowel syndrome with constipation  Prn bowel regimen available    Essential tremor  Not on any medications currently.     History of lung cancer  S/p left upper lobe lobectomy in 2001. Not an active problem.    Glaucoma  Continue home eye drops.    Consultations This Hospital Stay    Cardiology    Ancillary Consultations This Hospital Stay   CARE MANAGEMENT / SOCIAL WORK IP CONSULT    Code Status   Full Code    The discharge plan was discussed with the patient and her  at bedside, and they expressed understanding.     Time Spent on this Encounter   Total time on this discharge was 50 minutes.       Jc Ko MD  [facility]  ______________________________________________________________________    Physical Exam   Vital Signs: Temp: 97.5  F (36.4  C) Temp src: Axillary BP: (!) 154/61 Pulse: 68   Resp: 19 SpO2: 92 % O2 Device: None (Room air) Oxygen Delivery: 2 LPM  Weight: 179 lbs 10.8 oz  Constitutional: alert and oriented, NAD  CV: Regular, no edema  Respiratory: CTA bilaterally  GI: Soft, non-tender   Skin: Warm and dry       Primary Care Physician   Amanuel Mckenzie  [facility] [address]  [facility] [id number]     Discharge Disposition   Discharged to home with home care to see  Condition at discharge: Stable    Significant Results and Procedures   Most Recent 3 BMP's:Recent Labs   Lab Test 01/15/22  0413 01/14/22  0417 01/13/22  1642 01/13/22  1505   * 126*  --  128*   POTASSIUM 4.9 4.4  --  4.6   CHLORIDE 95 93*  --  96   CO2 24 24  --  25   BUN 52* 54*  --  49*   CR 1.87* 2.14*  --  2.04*   ANIONGAP 7 9  --  7   BETH 8.3* 8.2*  --  8.5   * 115* 127* 130*   ,   Results for orders placed or performed during the hospital encounter of 01/10/22   POC US ABDOMEN LIMITED    Impression    [facility] Procedure Note      Limited Bedside ED Ultrasound of the Urinary Bladder:    PERFORMED BY: Dr. Julián Fox MD  INDICATIONS:  Suprapubic pain  PROBE: Low frequency convex probe  BODY LOCATION:  Abdomen  FINDINGS:  Visualization of the bladder in longitudinal and transverse views demonstrated a distended state.  The bladder dimensions measured: 3 cm width X 4 height cm X 4 cm length.  INTERPRETATION:  Total calculated volume was estimated at 50 cc.  The bladder is minimally distended.  IMAGE DOCUMENTATION: Images were archived to PACs system.      [facility] Procedure Note    Limited Bedside ED Renal Ultrasound:    PERFORMED BY: Dr. Julián Fox MD  INDICATIONS:  Abdominal Pain  PROBE: Low frequency convex probe  BODY LOCATION:  Abdomen  FINDINGS:  The ultrasound was performed  with longitudinal and transverse views.   Right Kidney:   Hydronephrosis:  None   Renal cyst:  Single  Left Kidney:   Hydronephrosis:  None   Renal cyst:  None  INTERPRETATION:  The evaluation of the kidneys was normal without evidence of hydronephrosis or cysts.  IMAGE DOCUMENTATION: Images were archived to PACs system.       CT Abdomen Pelvis w/o Contrast    Narrative    CT ABDOMEN PELVIS WITHOUT CONTRAST 1/10/2022 12:01 PM    CLINICAL HISTORY: Abdominal pain, acute, nonlocalized. Refuses  contrast.  TECHNIQUE: CT scan of the abdomen and pelvis was performed without IV  contrast. Multiplanar reformats were obtained. Dose reduction  techniques were used.  CONTRAST: None.    COMPARISON: CT abdomen and pelvis dated 2/27/2019.    FINDINGS:   LOWER CHEST: There are small bilateral pleural fluid collections with  adjacent compressive atelectasis in the posterior aspects of the  bilateral lungs. Pacer/defibrillator wires are seen in the heart.  There are thoracic aortic calcifications.    HEPATOBILIARY: The gallbladder is somewhat indistinct and may be some  stranding around the gallbladder. Cholecystitis is not excluded. No  cholelithiasis is seen. Common bile duct is mildly prominent at 0.8  cm. No definite biliary ductal dilatation or choledocholithiasis is  identified.    PANCREAS: Normal.    SPLEEN: Normal.    ADRENAL GLANDS: Normal.    KIDNEYS/BLADDER: Kidneys appear somewhat atrophic, similar to the  prior study. Right renal cyst is again noted also similar to the prior  study. Probable left renal cyst measuring up to approximately 1 cm has  an average density of -1 Hounsfield units (image 36 series 2) but was  not as well-seen on the prior study. No hydronephrosis,  nephrolithiasis, hydroureter, or ureteral calculus is identified.  Urinary bladder is grossly unremarkable.    BOWEL: The colon is grossly of normal caliber without pericolonic  inflammatory change to suggest acute diverticulitis or colitis.  Appendix  is normal in appearance. Small bowel is of normal caliber and  appearance. Stomach contains a small amount of air and fluid, but is  otherwise unremarkable.    LYMPH NODES: Normal.    VASCULATURE: There are bilateral common iliac artery stents. There is  nonaneurysmal atherosclerosis.    PELVIC ORGANS: Uterus is not seen, consistent with surgical history of  prior hysterectomy. Ovaries are not seen and are also likely  surgically absent. No adnexal masses are identified.    OTHER: There is a small amount of free fluid in the pelvic recesses of  uncertain clinical significance and etiology. No free air is  identified.    MUSCULOSKELETAL: No aggressive osseous lesions or acute osseous  fractures are identified. There are degenerative changes in the spine.      Impression    IMPRESSION:   1.  New small bilateral pleural fluid collections with adjacent  compressive atelectasis in the bilateral lungs of uncertain clinical  significance and etiology.  2.  New small amount of free intraperitoneal fluid in the pelvis.  3.  Probable cyst lateral left kidney was not as well-seen on the  prior study. Right renal cyst is again seen.  4.  Strandy densities around the gallbladder with indistinct edge.  Acute cholecystitis is certainly not excluded on the basis of this  study. Further evaluation with dedicated gallbladder ultrasound is  recommended. There may be some very mild reactive wall thickening of  the adjacent hepatic flexure of the colon. Colon is otherwise  unremarkable.    I discussed the possible gallbladder abnormality as well as the new  small bilateral pleural fluid collections and free intraperitoneal  fluid with Dr. Fox on 1/10/2022 at 12:15 PM.    ADARSH SPENCER MD         SYSTEM ID:  [id number]   Abdomen US, limited (RUQ only)    Narrative    US ABDOMEN LIMITED 1/10/2022 2:38 PM    CLINICAL HISTORY: Abdominal pain, abnormal abdominal CT.    TECHNIQUE: Limited abdominal ultrasound.    COMPARISON: CT abdomen and  pelvis dated 1/10/2022.    FINDINGS:    GALLBLADDER: There is gallbladder wall thickening at 0.4 cm. Small  amount of pericholecystic fluid is seen. No sonographic Talley's sign  or cholelithiasis.    BILE DUCTS: There is no biliary dilatation. The common duct measures 4  mm.    LIVER: Normal where seen.    RIGHT KIDNEY: No hydronephrosis. There is a 3.1 x 3.0 x 4.5 cm simple  appearing cyst in the peripheral aspect of the right kidney.    PANCREAS: The visualized portions of the pancreas are normal.    No ascites. There is trace right pleural fluid.      Impression    IMPRESSION:  1.  Abnormal thickening of the gallbladder wall at 0.5 cm. There is  also a small amount of pericholecystic fluid. This can be associated  with acute cholecystitis but has a differential diagnosis also  including other etiology such as hypoalbuminemia, hepatitis,  congestive heart failure as well as others. No cholelithiasis or  sonographic Talley's sign to confirm acute cholecystitis.  2. Trace right pleural effusion.      ADARSH SPENCER MD         SYSTEM ID:  [id number]   XR Chest Port 1 View    Narrative    CHEST ONE VIEW PORTABLE   1/10/2022 12:49 PM     HISTORY: Pleural effusions on CT.    COMPARISON: PET/CT on 7/15/2010      Impression    IMPRESSION: Single AP view of the chest was obtained. Left chest wall  dual lead AICD noted. Mild enlargement of the cardiac silhouette.  Small left pleural effusion and associated basilar  atelectasis/consolidation. No significant right pleural effusion. No  significant pneumothorax.    RAIMUNDO SAUCEDO MD         SYSTEM ID:  [id number]   POC US CHEST B-SCAN    Impression    [facility] Procedure Note      Limited Bedside ED Cardiac Ultrasound:    PROCEDURE: PERFORMED BY: Dr. Julián Fox MD  INDICATIONS/SYMPTOM:  Shortness of Breath  PROBE: Cardiac phased array probe and High frequency linear probe  BODY LOCATION: Chest  FINDINGS:   The ultrasound was performed utilizing the subcostal,  parasternal long axis and parasternal short axis views.  Cardiac contractility:  Present  Gross estimation of cardiac kinesis: normal  Pericardial Effusion:  None  RV:LV ratio: LV > RV  IVC:    Diameter:  IVC diameter expiration (IVCe) 2-3 cm                                                   IVC diameter inspiration (IVCi) 1-2 cm                                                       Collapsibility:  IVC collapses < 50% with inspiration  INTERPRETATION:    Chamber size and motion were grossly normal with LV > RV, normal cardiac kinesis.  No pericardial effusion was found.  IVC visualized and findings indicate ?hypervolemia.  IMAGE DOCUMENTATION: Images were archived to PACs system.      [facility] Procedure Note      Limited Bedside ED Ultrasound of Thorax:    PROCEDURE: PERFORMED BY: Dr. Julián Fox MD  INDICATIONS/SYMPTOM:  shortness of breath  PROBE: High frequency linear probe  BODY LOCATION: Chest  FINDINGS:  Images of both lung hemithoracies taken in 2D in multiple rib spaces        Right side:  Lung sliding artifact  Present     Comet tail artifacts  Absent   Left side:  Lung sliding artifact  Present     Comet tail artifacts  Absent   Pleural effusion: Right side Present      Left side Present    INTERPRETATION: There is evidence of free fluid consistent with a Bilateral pleural effusion.  IMAGE DOCUMENTATION: Images were archived to PACs system.    [facility] Procedure Note      Limited Bedside ED Ultrasound of the Urinary Bladder:    PERFORMED BY: Dr. Julián Fox MD  INDICATIONS:  Possible obstrucion and/or mass  PROBE: Low frequency convex probe  BODY LOCATION:  Abdomen  FINDINGS:  Visualization of the bladder in longitudinal and transverse views demonstrated a distended state.  The bladder dimensions measured: 6 cm width X 6 height cm X 7 cm length.  INTERPRETATION:  Total calculated volume was estimated at 280 cc.  The bladder is abnormally distended.  IMAGE DOCUMENTATION: Images  were archived to PACs system.     Chest XR,  PA & LAT    Narrative    CHEST TWO VIEWS 2022 1:36 PM     HISTORY: Dyspnea.    COMPARISON: 1/10/2022.       Impression    IMPRESSION: Cardiac silhouette is mildly enlarged, but unchanged.  Interval development of small left pleural effusion. Mild atelectasis  is seen in both lung bases. 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 Cervical spinal fusion hardware is noted. No significant  bony abnormalities.    DERRICK ALBA MD         SYSTEM ID:  [id number]   XR Chest Port 1 View    Narrative    EXAM: XR CHEST PORT 1 VIEW  LOCATION: [facility]  DATE/TIME: 2022 10:17 PM    INDICATION: Acute respiratory distress? flash pulmonary edema  COMPARISON: 2014      Impression    IMPRESSION: Left subclavian approach pacing device.    Interval development of left greater than right small bilateral pleural effusions with engorged indistinct central pulmonary vasculature, correlate for interstitial pulmonary edema. Borderline enlarged cardiac silhouette with atherosclerotic   calcifications. Cervical fusion hardware.   XR Chest Port 1 View    Narrative    EXAM: XR CHEST PORT 1 VIEW  LOCATION: [facility]  DATE/TIME: 2022 6:44 PM    INDICATION: Increased respiratory distress  COMPARISON: 2022 at 2217      Impression    IMPRESSION: Small bilateral pleural effusions similar to prior. Mild increased vascularity unchanged. Mild bibasilar atelectatic changes similar to prior. No pneumothorax.   Echocardiogram Complete     Value    LVEF  60-65%    Narrative    [id number]  [id number]  [id number]  [id number]^MADINA^STEVEN^DRAGAN     [facility]  Echocardiography Laboratory  [address] [place] [address]     Name: PATT AGUIRRE  MRN: [id number]  : 1940  Study Date: 2022 01:46 PM  Age: 81 yrs  Gender: Female  Patient Location: [facility]  Reason For Study: CHF  Ordering Physician:  STEVEN PAINTER  Referring Physician: Parveen Short  Performed By: Violetta Menchaca RDCS     BSA: 1.8 m2  Height: 64 in  Weight: 162 lb  HR: 63  BP: 128/88 mmHg  ______________________________________________________________________________  Procedure  Complete Portable Echo Adult.  ______________________________________________________________________________  Interpretation Summary     Left ventricular systolic function is normal.  The visual ejection fraction is 60-65%.  Grade II or moderate diastolic dysfunction.  The right ventricle is normal in structure, function and size.  Moderate (46-55mmHg) pulmonary hypertension is present.  There is moderate (2+) mitral regurgitation.  There is mild to moderate (1-2+) tricuspid regurgitation.  The ascending aorta is Mildly dilated.  There is no pericardial effusion.  Dilation of the inferior vena cava is present with abnormal respiratory  variation in diameter.     No prior for comparison.  ______________________________________________________________________________  Left Ventricle  The left ventricle is normal in size. There is mild concentric left  ventricular hypertrophy. Left ventricular systolic function is normal. The  visual ejection fraction is 60-65%. Grade II or moderate diastolic  dysfunction. Normal left ventricular wall motion.     Right Ventricle  The right ventricle is normal in structure, function and size. There is a  pacemaker lead in the right ventricle.     Atria  The left atrium is mildly dilated. The right atrium is mildly dilated.     Mitral Valve  There is mild mitral annular calcification. There is moderate (2+) mitral  regurgitation.     Tricuspid Valve  The tricuspid valve is normal in structure and function. Moderate (46-55mmHg)  pulmonary hypertension is present. The right ventricular systolic pressure is  approximated at 39mmHg plus the right atrial pressure. There is mild to  moderate (1-2+) tricuspid regurgitation.     Aortic  Valve  There is mild trileaflet aortic sclerosis. There is mild (1+) aortic  regurgitation.     Pulmonic Valve  The pulmonic valve is normal in structure and function. There is mild (1+)  pulmonic valvular regurgitation.     Vessels  The aortic root is normal size. The ascending aorta is Mildly dilated.  Dilation of the inferior vena cava is present with abnormal respiratory  variation in diameter.     Pericardium  There is no pericardial effusion.     ______________________________________________________________________________  MMode/2D Measurements & Calculations  IVSd: 1.4 cm  LVIDd: 4.4 cm  LVIDs: 2.5 cm  LVPWd: 1.4 cm  FS: 42.9 %  LV mass(C)d: 242.3 grams  LV mass(C)dI: 135.5 grams/m2     Ao root diam: 3.6 cm  LA dimension: 3.7 cm  asc Aorta Diam: 3.8 cm  LA/Ao: 1.0  LA Volume (BP): 68.0 ml  LA Volume Index (BP): 38.0 ml/m2  RWT: 0.65     Doppler Measurements & Calculations  MV E max reginald: 126.4 cm/sec  MV A max reginald: 81.0 cm/sec  MV E/A: 1.6  MV dec time: 0.13 sec     AI P1/2t: 366.2 msec  MR ERO: 0.14 cm2  MR volume: 20.5 ml  TR max reginald: 312.2 cm/sec  TR max P.0 mmHg  E/E' av.5  Lateral E/e': 12.6  Medial E/e': 20.4     ______________________________________________________________________________  Report approved by: Cherise Rizzo 2022 05:24 PM             Procedures    BiPAP    Hernández catheter placed and removed    Discharge Orders      Reason for your hospital stay    Acute renal failure most likely due to dehydration and this has improved. Stay was complicated by acute heart failure due to too much IV fluid. This has resolved with some diuretic therapy.     Follow-up and recommended labs and tests     Follow up with your nephrologist in the next 7-10 days. Follow up with your pacemaker clinic regarding the pacemaker being at end of life.     Activity    Your activity upon discharge: activity as tolerated     Diet    Follow this diet upon discharge: Low salt/low sodium      Fluid  restriction 48 oz/day     Discharge Medications   Current Discharge Medication List      START taking these medications    Details   neomycin-polymyxin-hydrocortisone (CORTISPORIN) 3.5-84100-3 otic suspension Place 3 drops into the right ear 4 times daily for 5 days  Qty: 3 mL, Refills: 0    Associated Diagnoses: Infective otitis externa, right         CONTINUE these medications which have NOT CHANGED    Details   ALPRAZolam (XANAX) 0.25 MG tablet Take 1 tablet by mouth 2 times daily      aspirin 81 MG chewable tablet Take 81 mg by mouth every evening      brinzolamide-brimonidine (SIMBRINZA) 1-0.2 % ophthalmic suspension Place 1 drop Into the left eye 2 times daily       CARVEDILOL 12.5 MG OR TABS take 1 tablet in am, 2 tablets in afternoon and 1 tablet at bedtime      Cholecalciferol (VITAMIN D3 PO) Take 2,000 Units by mouth daily       diltiazem ER COATED BEADS (CARDIZEM CD/CARTIA XT) 240 MG 24 hr capsule Take 1 capsule by mouth daily      flecainide (TAMBOCOR) 50 MG tablet Take 50 mg by mouth 2 times daily      fluticasone (FLONASE) 50 MCG/ACT nasal spray Spray 1 spray into both nostrils daily as needed       furosemide (LASIX) 20 MG tablet Take 1 tablet by mouth daily      HYDROcodone-acetaminophen (NORCO) 5-325 MG per tablet Take 1 tablet by mouth every 6 hours as needed for pain   Qty: 1 tablet, Refills: 0      hypromellose (GENTEAL) ophthalmic gel 0.3% Place 1-2 drops into both eyes At Bedtime      levothyroxine (SYNTHROID/LEVOTHROID) 88 MCG tablet Take 1 tablet by mouth daily      lidocaine (LIDODERM) 5 % patch Place 1-3 patches onto the skin every 24 hours 12 hours on, 12 hours off      !! lisinopril (ZESTRIL) 30 MG tablet Take 1 tablet by mouth At Bedtime      !! lisinopril (ZESTRIL) 5 MG tablet Take 1 tablet by mouth every morning      MELATONIN PO Take 5 mg by mouth At Bedtime      omeprazole (PRILOSEC) 20 MG DR capsule Take 1 capsule by mouth daily      polyethylene glycol (MIRALAX/GLYCOLAX) powder  Take 1 capful by mouth every evening       polyethylene glycol 0.4%- propylene glycol 0.3% (SYSTANE ULTRA) 0.4-0.3 % SOLN ophthalmic solution Place 1 drop into both eyes every hour as needed for dry eyes      pravastatin (PRAVACHOL) 20 MG tablet Take 1 tablet by mouth every evening       psyllium (METAMUCIL) 58.6 % POWD Take 3 teaspoonful by mouth every evening = 1 Tablespoons       saccharomyces boulardii (FLORASTOR) 250 MG capsule Take 250 mg by mouth daily (before lunch)      spironolactone (ALDACTONE) 25 MG tablet Take 12.5 mg by mouth daily      warfarin ANTICOAGULANT (COUMADIN) 2.5 MG tablet 5 mg on Wed. 2.5 mg all other days. or as directed by Coag clinic      XALATAN 0.005 % OP SOLN Place 1 drop into both eyes At Bedtime       hydrOXYzine (VISTARIL) 25 MG capsule #90/30 dys supply       !! - Potential duplicate medications found. Please discuss with provider.      STOP taking these medications       omeprazole 20 MG tablet Comments:   Reason for Stopping:             Allergies   Allergies   Allergen Reactions     Amitriptyline Other (See Comments)     Complex migraines with stroke like symptoms     Atenolol Headache     Occular migraine     Codeine Other (See Comments)     Lightheaded and feels strange     Coreg [Carvedilol] Other (See Comments)     CR only - loss of hair     Crestor [Rosuvastatin] Headache     Doxycycline Other (See Comments)     vomiting     Duloxetine Other (See Comments)     Confusion       Fentanyl Other (See Comments)     Ineffective       Gabapentin Other (See Comments)     Confusion       Kenalog [Triamcinolone] Other (See Comments)     Hypertension       Lotrel Other (See Comments)     Stroke like symptoms     Niaspan [Niacin] Other (See Comments)     Flushing - too hot     Other [No Clinical Screening - See Comments]      Fentanyl/Versed combination     Other [Seasonal Allergies]      Beta blockers except for carvedilol     Percocet [Oxycodone-Acetaminophen] Headache     Percodan  [Oxycodone-Aspirin] Nausea and Vomiting     Premarin      Tape [Adhesive Tape]      Tramadol Nausea     Vioxx      Zocor [Simvastatin] Other (See Comments)     Stroke like symptoms     Lyrica [Pregabalin] Anxiety

## 2022-01-15 NOTE — PLAN OF CARE
"Awake at 0400, states needed to get up for stool, up on commode but unable to have BM at this time, wants to have smith removed at this time.  Patient had smith bag threaded thru pants leg and thru underwear yesterday and with movement in bed and getting up secure device was snapped apart and smith no longer secured this bothered patient and she told this writer if I didn't remove it she would \"just pull it out\".  Smith removed safely by this writer.  Will need to monitor closely as smith was placed on admission because of retention issues.  Other wise returned to bed to sleep more.  Requested pain pill at 2400 but had last pill at 2125 so too early, offered pain pill now but told this writer she didn't have pain at this time.  Kept oxygen in overnight at 2 LPM, is eager to get rid of such as doesn't use Home oxygen at this time.  Plan of care continues.   "

## 2022-01-17 ENCOUNTER — PATIENT OUTREACH (OUTPATIENT)
Dept: CARE COORDINATION | Facility: CLINIC | Age: 82
End: 2022-01-17
Payer: MEDICARE

## 2022-01-17 DIAGNOSIS — Z71.89 OTHER SPECIFIED COUNSELING: ICD-10-CM

## 2022-01-17 NOTE — PROGRESS NOTES
Clinic Care Coordination Contact  Community Health Worker Initial Outreach       Patient's  asked if CHW could call back in 1 hour.      REGI Devlin  [phone]  [facility]

## 2022-01-17 NOTE — PROGRESS NOTES
"Clinic Care Coordination Contact  [facility]: Post-Discharge Note  SITUATION                                                      Admission:    Admission Date: 01/10/22   Reason for Admission: (HFpEF) heart failure with preserved ejection fraction  Discharge:   Discharge Date: 01/15/22  Discharge Diagnosis: (HFpEF) heart failure with preserved ejection fraction    BACKGROUND                                                      Aditi Palacios is a 81 year old female who presented to the emergency department for evaluation decreased urination, progressive malaise and fatigue.     Patient has a known history of urethral stenosis and has required prior cystoscopies and urethral dilatations through her urologist Dr. Alonzo.  Her most recent dilatation was on 2/4/2021.  Says \"they have not found a permanent fix.\"  Over the past few days she noted some decreased urination.  On 1/8/2022 she presented to the urgency room for evaluation and was diagnosed with urinary retention.  A Hernández catheter was placed and her bladder was drained.  She was given the option to discharge with a Hernández in place, which she declined.  She was advised to follow-up with her urologist in the next few days.  She says \"I know it is not my urethral stenosis,\" so she did not follow-up with her urologist.    ASSESSMENT      Enrollment  Primary Care Care Coordination Status: Not a Candidate    Discharge Assessment  How are you doing now that you are home?: She is doing a lot better than hospital. Still having a little trouble breathing. Did talk to someone about hospice over the weekend and that is off the table.  How are your symptoms? (Red Flag symptoms escalate to triage hotline per guidelines): Unchanged  Do you feel your condition is stable enough to be safe at home until your provider visit?: Yes  Does the patient have their discharge instructions? : Yes  Does the patient have questions regarding their discharge instructions? : No  Were " you started on any new medications or were there changes to any of your previous medications? : Yes  Does the patient have all of their medications?: Yes  Do you have questions regarding any of your medications? : No  Do you have all of your needed medical supplies or equipment (DME)?  (i.e. oxygen tank, CPAP, cane, etc.): Yes  Discharge follow-up appointment scheduled within 14 calendar days? : Yes  Discharge Follow Up Appointment Date: 01/17/22 (Patient had a follow up this morning with nephrologist and has a follow up with PCP tomorrow morning)  Discharge Follow Up Appointment Scheduled with?: Specialty Care Provider                  PLAN                                                      Outpatient Plan: Follow up with your nephrologist in the next 7-10 days. Follow up with your pacemaker clinic regarding the pacemaker  being at end of life.    No future appointments.      For any urgent concerns, please contact our 24 hour nurse triage line: [phone] ([phone])       REGI Devlin  [phone]  Connected Care Resource [facility]

## 2024-10-09 NOTE — ED NOTES
Called patient and discussed holding Eliquis 1 day prior to colonoscopy. She verbalized understanding.     Isabella KAY, RN    [facility]  Office: [phone]  Fax: [phone]       Patient resting comfortably after Dr Fox rounded on the patient 10:30.   On 4L NC